# Patient Record
Sex: MALE | Race: WHITE | Employment: FULL TIME | URBAN - METROPOLITAN AREA
[De-identification: names, ages, dates, MRNs, and addresses within clinical notes are randomized per-mention and may not be internally consistent; named-entity substitution may affect disease eponyms.]

---

## 2020-01-06 ENCOUNTER — OFFICE VISIT (OUTPATIENT)
Dept: ORTHOPEDIC SURGERY | Age: 65
End: 2020-01-06
Payer: COMMERCIAL

## 2020-01-06 VITALS — WEIGHT: 235 LBS | HEIGHT: 72 IN | BODY MASS INDEX: 31.83 KG/M2

## 2020-01-06 PROCEDURE — 99203 OFFICE O/P NEW LOW 30 MIN: CPT | Performed by: PHYSICIAN ASSISTANT

## 2020-01-06 NOTE — PROGRESS NOTES
Godwin Gage CHIEF COMPLAINT:    Chief Complaint   Patient presents with    Knee Pain     RIGHT KNEE. STARTED LAST MONTH RANDOMLY, NO INJURY       HISTORY OF PRESENT ILLNESS:                The patient is a 60 y.o. male presents the clinic for evaluation of right knee pain. He states his pain began a couple weeks ago. He has a difficult time pinpointing one incident for his pain however, did seem to begin acutely. He works a very physical job. He points to the medial joint space as the primary location of his pain. His pain is worsened with any twisting type movements and relieved by rest.  He is using a cane for ambulation. Past Medical History:   Diagnosis Date    Asthma 1995    Enlarged prostate     Gout 9/2014    HH (hiatus hernia) 2012    Antoine Pearson    LVH (left ventricular hypertrophy) 03/2011    Echo concentric LVH    Pulmonary embolus ([address] 3/2011    After Shoulder Surgery    Rosacea     Ligiaki's ring 06/2010    EGD s/p Dilatation    Toenail fungus     Foot and Toenail Fungus.  Tremor, essential     Positive Family History Mother. 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    The pain assessment was noted & is as follows:  Pain Assessment  Location of Pain: Knee  Location Modifiers: Right  Severity of Pain: 7  Quality of Pain: Sharp, Aching, Dull  Duration of Pain: Persistent  Frequency of Pain: Constant  Aggravating Factors: Walking, Stairs, Stretching, Bending  Limiting Behavior: Some  Relieving Factors: Rest  Result of Injury: No  Work-Related Injury: No  Are there other pain locations you wish to document?: No]      Work Status/Functionality:     Past Medical History: Medical history form was reviewed today & can be found in the media tab  Past Medical History:   Diagnosis Date    Asthma 1995    Enlarged prostate     Gout 9/2014    [place] (hiatus hernia) 2012    Antoine Pearson    LVH (left ventricular hypertrophy) 03/2011    Echo concentric LVH    Pulmonary embolus (HCC) 3/2011    After Shoulder Surgery    Rosacea     Schatzki's ring 06/2010    EGD s/p Dilatation    Toenail fungus     Foot and Toenail Fungus.  Tremor, essential     Positive Family History Mother.  Wears glasses     for reading      Past Surgical History:     Past Surgical History:   Procedure Laterality Date    COLONOSCOPY  06/2010    Nl    ESOPHAGEAL DILATATION      SHOULDER ARTHROSCOPY  3/11     right, neer acromioplasty , alvin, mini open rotator cuff    UPPER GASTROINTESTINAL ENDOSCOPY  06/2010    Schatzki Ring s/p Dilatation     Current Medications:     Current Outpatient Medications:     ADVAIR DISKUS 100-50 MCG/DOSE diskus inhaler, INHALE 1 DOSE BY MOUTH TWICE DAILY. RINSE MOUTH AFTER USE, Disp: 1 Inhaler, Rfl: 1    allopurinol (ZYLOPRIM) 300 MG tablet, 1/2 pill a day. For gout prevention. , Disp: 15 tablet, Rfl: 6  Allergies:  Patient has no known allergies. Social History:    reports that he has never smoked. He has never used smokeless tobacco. He reports current alcohol use. He reports that he does not use drugs. Family History:   Family History   Problem Relation Age of Onset    Stroke Father     Asthma Father     Dementia Father     Pacemaker Mother     Cancer Sister         Breast CA    Cancer Sister         Cervical CA       REVIEW OF SYSTEMS:   For new problems, a full review of systems will be found scanned in the patient's chart. CONSTITUTIONAL: Denies unexplained weight loss, fevers, chills   NEUROLOGICAL: Denies unsteady gait or progressive weakness  SKIN: Denies skin changes, delayed healing, rash, itching       PHYSICAL EXAM:    Vitals: Height 6' 0.01\" (1.829 m), weight 235 lb (106.6 kg). GENERAL EXAM:  · General Apparence: Patient is adequately groomed with no evidence of malnutrition. · Orientation: The patient is oriented to time, place and person. · Mood & Affect: The patient's mood and affect are appropriate       Right knee PHYSICAL EXAMINATION:  · Inspection: No visible deformity. Mild effusion is noted    · Palpation: There is to palpation along the anterior medial aspect of the knee at the joint space    · Range of Motion: Range of motion is limited approximately 3 to 120 degrees    · Strength: No gross strength deficits are noted    · Special Tests: Significant pain with medial and lateral Jesus Manuel's testing. Ligamentous testing is normal.  Negative Homans testing. · Skin:  There are no rashes, ulcerations or lesions. · There are no dysvascular changes     Gait & station: Ataxic with a cane      Additional Examinations:        Left Lower Extremity: Examination of the left lower extremity does not show any tenderness, deformity or injury. Range of motion is unremarkable. There is no gross instability. There are no rashes, ulcerations or lesions. Strength and tone are normal.      Diagnostic Testing: The following x rays were read and interpreted by myself      1.  3 x-rays of the right knee were taken today and reveal moderate tricompartmental osteoarthritis with well-maintained joint spaces    Orders     Orders Placed This Encounter   Procedures    XR KNEE RIGHT (3 VIEWS)     Standing Status:   Future     Number of Occurrences:   1     Standing Expiration Date:   1/6/2021         Assessment / Treatment Plan:     1. Right knee medial meniscus tear    His clinical exam findings today, I am very concerned about a medial meniscus tear. I would like to obtain a MRI of the right knee and see him back to review the results of the study. Of note, he does have a history of bilateral pulmonary embolisms following a right shoulder arthroscopy many years ago. He is not currently on any anticoagulant however, if we decide to perform surgery, we will need to anticoagulate him postoperatively.       Onesimo Kelly, [facility]    This dictation was performed with a verbal

## 2020-01-07 ENCOUNTER — TELEPHONE (OUTPATIENT)
Dept: ORTHOPEDIC SURGERY | Age: 65
End: 2020-01-07

## 2020-01-15 ENCOUNTER — OFFICE VISIT (OUTPATIENT)
Dept: ORTHOPEDIC SURGERY | Age: 65
End: 2020-01-15
Payer: COMMERCIAL

## 2020-01-15 VITALS — BODY MASS INDEX: 31.83 KG/M2 | HEIGHT: 72 IN | WEIGHT: 235.01 LBS

## 2020-01-15 PROCEDURE — 99214 OFFICE O/P EST MOD 30 MIN: CPT | Performed by: ORTHOPAEDIC SURGERY

## 2020-01-15 RX ORDER — LISINOPRIL 10 MG/1
TABLET ORAL DAILY
COMMUNITY
Start: 2020-01-04 | End: 2020-05-08 | Stop reason: DRUGHIGH

## 2020-01-15 RX ORDER — MELOXICAM 15 MG/1
15 TABLET ORAL DAILY
Qty: 30 TABLET | Refills: 1 | Status: SHIPPED | OUTPATIENT
Start: 2020-01-15 | End: 2020-01-15 | Stop reason: SDUPTHER

## 2020-01-15 RX ORDER — MELOXICAM 15 MG/1
15 TABLET ORAL DAILY
Qty: 90 TABLET | Refills: 1 | Status: SHIPPED | OUTPATIENT
Start: 2020-01-15 | End: 2020-07-06

## 2020-01-15 NOTE — PROGRESS NOTES
LVH (left ventricular hypertrophy) 03/2011    Echo concentric LVH    Pulmonary embolus (HCC) 3/2011    After Shoulder Surgery    Rosacea     Schatzki's ring 06/2010    EGD s/p Dilatation    Toenail fungus     Foot and Toenail Fungus.  Tremor, essential     Positive Family History Mother.  Wears glasses     for reading      Past Surgical History:     Past Surgical History:   Procedure Laterality Date    COLONOSCOPY  06/2010    Nl    ESOPHAGEAL DILATATION      SHOULDER ARTHROSCOPY  3/11     right, neer acromioplasty , alvin, mini open rotator cuff    UPPER GASTROINTESTINAL ENDOSCOPY  06/2010    Schatzki Ring s/p Dilatation     Current Medications:     Current Outpatient Medications:     lisinopril (PRINIVIL;ZESTRIL) 10 MG tablet, , Disp: , Rfl:     ADVAIR DISKUS 100-50 MCG/DOSE diskus inhaler, INHALE 1 DOSE BY MOUTH TWICE DAILY. RINSE MOUTH AFTER USE, Disp: 1 Inhaler, Rfl: 1    allopurinol (ZYLOPRIM) 300 MG tablet, 1/2 pill a day. For gout prevention. , Disp: 15 tablet, Rfl: 6  Allergies:  Patient has no known allergies. Social History:    reports that he has never smoked. He has never used smokeless tobacco. He reports current alcohol use. He reports that he does not use drugs. Family History:   Family History   Problem Relation Age of Onset    Stroke Father     Asthma Father     Dementia Father     Pacemaker Mother     Cancer Sister         Breast CA    Cancer Sister         Cervical CA       REVIEW OF SYSTEMS:   For new problems, a full review of systems will be found scanned in the patient's chart. CONSTITUTIONAL: Denies unexplained weight loss, fevers, chills   NEUROLOGICAL: Denies unsteady gait or progressive weakness  SKIN: Denies skin changes, delayed healing, rash, itching       PHYSICAL EXAM:    Vitals: Height 6' 0.01\" (1.829 m), weight 235 lb 0.2 oz (106.6 kg). GENERAL EXAM:  · General Apparence: Patient is adequately groomed with no evidence of malnutrition. · Orientation:  The allows the patient to return to good function at about 6 weeks, that it often takes 6 months to completely rehabilitate from this operation. The patient also realizes that if there is an arthritic component to the symptoms, then they may still have some degree of arthritis pain. 3.  Been examination anesthesia via arthroscopy of the right knee, partial meniscectomy, chondroplasty and sign of ectomy. He will need postop anticoagulation. I have personally performed and/or participated in the history, exam and medical decision making and agree with all pertinent clinical information. I have also reviewed and agree with the past medical, family and social history unless otherwise noted. This dictation was performed with a verbal recognition program (DRAGON) and it was checked for errors. It is possible that there are still dictated errors within this office note. If so, please bring any errors to my attention for an addendum. All efforts were made to ensure that this office note is accurate.           Praveen Concepcion MD

## 2020-02-25 ENCOUNTER — ANESTHESIA EVENT (OUTPATIENT)
Dept: OPERATING ROOM | Age: 65
End: 2020-02-25
Payer: COMMERCIAL

## 2020-02-25 ASSESSMENT — ENCOUNTER SYMPTOMS: SHORTNESS OF BREATH: 1

## 2020-02-25 NOTE — ANESTHESIA PRE PROCEDURE
Department of Anesthesiology  Preprocedure Note       Name:  Alex Gonzalez   Age:  60 y.o.  :  1955                                          MRN:  [id number]         Date:  2020      Surgeon: Oriana Han):  Rachel Joyce MD    Procedure: PHACOEMULSIFICATION OF CATARACT LEFT EYE WITH INTRAOCULAR LENS IMPLANT (Left Eye)    Medications prior to admission:   Prior to Admission medications    Medication Sig Start Date End Date Taking? Authorizing Provider   lisinopril (PRINIVIL;ZESTRIL) 10 MG tablet daily  20   Historical Provider, MD   meloxicam (MOBIC) 15 MG tablet Take 1 tablet by mouth daily 1/15/20   Sylvain Muir MD   ADVAIR DISKUS 100-50 MCG/DOSE diskus inhaler INHALE 1 DOSE BY MOUTH TWICE DAILY. RINSE MOUTH AFTER USE 12/4/15   Rich Roland MD   allopurinol (ZYLOPRIM) 300 MG tablet 1/2 pill a day. For gout prevention. Patient taking differently: 300 mg For gout prevention. 14   Rich Roland MD       Current medications:    No current facility-administered medications for this encounter. Current Outpatient Medications   Medication Sig Dispense Refill    lisinopril (PRINIVIL;ZESTRIL) 10 MG tablet daily       meloxicam (MOBIC) 15 MG tablet Take 1 tablet by mouth daily 90 tablet 1    ADVAIR DISKUS 100-50 MCG/DOSE diskus inhaler INHALE 1 DOSE BY MOUTH TWICE DAILY. RINSE MOUTH AFTER USE 1 Inhaler 1    allopurinol (ZYLOPRIM) 300 MG tablet 1/2 pill a day. For gout prevention. (Patient taking differently: 300 mg For gout prevention. ) 15 tablet 6       Allergies:  No Known Allergies    Problem List:    Patient Active Problem List   Diagnosis Code    Pulmonary embolism (HCC) I26.99    Pneumonia J18.9    Dyspnea R06.00    Constipation K59.00    Hyperlipemia E78.5    S/P shoulder surgery Z98.890    Pulmonary embolus (HCC) I26.99    Rosacea L71.9    Schatzki's ring K22.2    LVH (left ventricular hypertrophy) I51.7    HH (hiatus hernia) K44.9    Asthma J45.909    Tremor, essential

## 2020-02-26 ENCOUNTER — HOSPITAL ENCOUNTER (OUTPATIENT)
Age: 65
Setting detail: OUTPATIENT SURGERY
Discharge: HOME | End: 2020-02-26
Attending: OPHTHALMOLOGY | Admitting: OPHTHALMOLOGY
Payer: COMMERCIAL

## 2020-02-26 ENCOUNTER — ANESTHESIA (OUTPATIENT)
Dept: OPERATING ROOM | Age: 65
End: 2020-02-26
Payer: COMMERCIAL

## 2020-02-26 VITALS
TEMPERATURE: 97 F | RESPIRATION RATE: 16 BRPM | HEIGHT: 72 IN | OXYGEN SATURATION: 100 % | HEART RATE: 75 BPM | DIASTOLIC BLOOD PRESSURE: 100 MMHG | SYSTOLIC BLOOD PRESSURE: 167 MMHG | WEIGHT: 225 LBS | BODY MASS INDEX: 30.48 KG/M2

## 2020-02-26 VITALS — DIASTOLIC BLOOD PRESSURE: 102 MMHG | OXYGEN SATURATION: 100 % | SYSTOLIC BLOOD PRESSURE: 145 MMHG | TEMPERATURE: 98.2 F

## 2020-02-26 PROCEDURE — 6370000000 HC RX 637 (ALT 250 FOR IP): Performed by: OPHTHALMOLOGY

## 2020-02-26 PROCEDURE — 7100000010 HC PHASE II RECOVERY - FIRST 15 MIN: Performed by: OPHTHALMOLOGY

## 2020-02-26 PROCEDURE — 6360000002 HC RX W HCPCS: Performed by: OPHTHALMOLOGY

## 2020-02-26 PROCEDURE — 2580000003 HC RX 258: Performed by: ANESTHESIOLOGY

## 2020-02-26 PROCEDURE — 7100000011 HC PHASE II RECOVERY - ADDTL 15 MIN: Performed by: OPHTHALMOLOGY

## 2020-02-26 PROCEDURE — 3600000013 HC SURGERY LEVEL 3 ADDTL 15MIN: Performed by: OPHTHALMOLOGY

## 2020-02-26 PROCEDURE — 6360000002 HC RX W HCPCS: Performed by: NURSE ANESTHETIST, CERTIFIED REGISTERED

## 2020-02-26 PROCEDURE — 3600000003 HC SURGERY LEVEL 3 BASE: Performed by: OPHTHALMOLOGY

## 2020-02-26 PROCEDURE — 3700000000 HC ANESTHESIA ATTENDED CARE: Performed by: OPHTHALMOLOGY

## 2020-02-26 PROCEDURE — 2500000003 HC RX 250 WO HCPCS: Performed by: OPHTHALMOLOGY

## 2020-02-26 PROCEDURE — V2632 POST CHMBR INTRAOCULAR LENS: HCPCS | Performed by: OPHTHALMOLOGY

## 2020-02-26 PROCEDURE — 2580000003 HC RX 258: Performed by: OPHTHALMOLOGY

## 2020-02-26 PROCEDURE — 2709999900 HC NON-CHARGEABLE SUPPLY: Performed by: OPHTHALMOLOGY

## 2020-02-26 PROCEDURE — 3700000001 HC ADD 15 MINUTES (ANESTHESIA): Performed by: OPHTHALMOLOGY

## 2020-02-26 DEVICE — ACRYSOF(R) IQ ASPHERIC IOL SP ACRYLIC FOLDABLELENS WULTRASERT(TM) DELIVERY SYSTEM UV WBLUE LIGHT FILTER. 13.0MM LENGTH 6.0MM ANTERIORASYMMETRIC BICONVEX OPTIC PLANAR HAPTICS.
Type: IMPLANTABLE DEVICE | Status: FUNCTIONAL
Brand: ACRYSOF ULTRASERT

## 2020-02-26 RX ORDER — MAGNESIUM HYDROXIDE 1200 MG/15ML
LIQUID ORAL PRN
Status: DISCONTINUED | OUTPATIENT
Start: 2020-02-26 | End: 2020-02-26 | Stop reason: ALTCHOICE

## 2020-02-26 RX ORDER — MOXIFLOXACIN 5 MG/ML
SOLUTION/ DROPS OPHTHALMIC PRN
Status: DISCONTINUED | OUTPATIENT
Start: 2020-02-26 | End: 2020-02-26 | Stop reason: ALTCHOICE

## 2020-02-26 RX ORDER — PROPOFOL 10 MG/ML
INJECTION, EMULSION INTRAVENOUS PRN
Status: DISCONTINUED | OUTPATIENT
Start: 2020-02-26 | End: 2020-02-26 | Stop reason: SDUPTHER

## 2020-02-26 RX ORDER — TIMOLOL MALEATE 5 MG/ML
SOLUTION/ DROPS OPHTHALMIC PRN
Status: DISCONTINUED | OUTPATIENT
Start: 2020-02-26 | End: 2020-02-26 | Stop reason: ALTCHOICE

## 2020-02-26 RX ORDER — SODIUM CHLORIDE 0.9 % (FLUSH) 0.9 %
10 SYRINGE (ML) INJECTION PRN
Status: DISCONTINUED | OUTPATIENT
Start: 2020-02-26 | End: 2020-02-26 | Stop reason: HOSPADM

## 2020-02-26 RX ORDER — TETRACAINE HYDROCHLORIDE 5 MG/ML
SOLUTION OPHTHALMIC PRN
Status: DISCONTINUED | OUTPATIENT
Start: 2020-02-26 | End: 2020-02-26 | Stop reason: ALTCHOICE

## 2020-02-26 RX ORDER — SODIUM CHLORIDE, SODIUM LACTATE, POTASSIUM CHLORIDE, CALCIUM CHLORIDE 600; 310; 30; 20 MG/100ML; MG/100ML; MG/100ML; MG/100ML
INJECTION, SOLUTION INTRAVENOUS CONTINUOUS
Status: DISCONTINUED | OUTPATIENT
Start: 2020-02-26 | End: 2020-02-26 | Stop reason: HOSPADM

## 2020-02-26 RX ORDER — SODIUM CHLORIDE, POTASSIUM CHLORIDE, CALCIUM CHLORIDE, MAGNESIUM CHLORIDE, SODIUM ACETATE, AND SODIUM CITRATE 6.4; .75; .48; .3; 3.9; 1.7 MG/ML; MG/ML; MG/ML; MG/ML; MG/ML; MG/ML
SOLUTION IRRIGATION PRN
Status: DISCONTINUED | OUTPATIENT
Start: 2020-02-26 | End: 2020-02-26 | Stop reason: ALTCHOICE

## 2020-02-26 RX ORDER — SODIUM CHLORIDE 0.9 % (FLUSH) 0.9 %
10 SYRINGE (ML) INJECTION EVERY 12 HOURS SCHEDULED
Status: DISCONTINUED | OUTPATIENT
Start: 2020-02-26 | End: 2020-02-26 | Stop reason: HOSPADM

## 2020-02-26 RX ORDER — LIDOCAINE HYDROCHLORIDE 10 MG/ML
0.3 INJECTION, SOLUTION EPIDURAL; INFILTRATION; INTRACAUDAL; PERINEURAL
Status: DISCONTINUED | OUTPATIENT
Start: 2020-02-26 | End: 2020-02-26 | Stop reason: HOSPADM

## 2020-02-26 RX ADMIN — PROPOFOL 130 MG: 10 INJECTION, EMULSION INTRAVENOUS at 08:31

## 2020-02-26 RX ADMIN — Medication 0.3 ML: at 07:15

## 2020-02-26 RX ADMIN — Medication 0.3 ML: at 07:05

## 2020-02-26 RX ADMIN — SODIUM CHLORIDE, POTASSIUM CHLORIDE, SODIUM LACTATE AND CALCIUM CHLORIDE: 600; 310; 30; 20 INJECTION, SOLUTION INTRAVENOUS at 07:18

## 2020-02-26 RX ADMIN — Medication 0.3 ML: at 07:10

## 2020-02-26 ASSESSMENT — PAIN SCALES - GENERAL: PAINLEVEL_OUTOF10: 0

## 2020-02-26 ASSESSMENT — PULMONARY FUNCTION TESTS
PIF_VALUE: 0
PIF_VALUE: 1
PIF_VALUE: 0
PIF_VALUE: 1
PIF_VALUE: 0

## 2020-02-26 ASSESSMENT — PAIN - FUNCTIONAL ASSESSMENT: PAIN_FUNCTIONAL_ASSESSMENT: 0-10

## 2020-02-26 NOTE — ANESTHESIA POSTPROCEDURE EVALUATION
BUN                      14                  06/07/2014 09:01 AM        CREATININE               1.1                 06/07/2014 09:01 AM        GLUCOSE                  99                  06/07/2014 09:01 AM   COAGS  Lab Results       Component                Value               Date/Time                  PROTIME                  21.0 (H)            03/31/2011 05:20 AM        PROTIME                  15.4                03/30/2011 12:44 PM        INR                      1.88 (H)            03/31/2011 05:20 AM        APTT                     51.7 (H)            03/30/2011 12:44 PM        APTT                     84 (H)              03/30/2011 12:44 PM     Intake & Output:  @59DVZN@    Nausea & Vomiting:  No    Level of Consciousness:  Awake    Pain Assessment:  Adequate analgesia    Anesthesia Complications:  No apparent anesthetic complications    SUMMARY      Vital signs stable  OK to discharge from Stage I post anesthesia care.   Care transferred from Anesthesiology department on discharge from perioperative area

## 2020-02-26 NOTE — OP NOTE
[facility]                                OPERATIVE REPORT    PATIENT NAME: Hermann Shone                :        1955  MED REC NO:   [id number]                          ROOM:  ACCOUNT NO:   [de-identified]                           ADMIT DATE: 2020  PROVIDER:     Jessica Hurt MD    DATE OF PROCEDURE:  2020    PREOPERATIVE DIAGNOSIS:  Cataract, left eye. POSTOPERATIVE DIAGNOSIS:  Cataract, left eye. OPERATION:  Phacoemulsification of the cataract of the left eye with a  posterior chamber lens implant. ANESTHESIA:  General / Monitored Anesthesia Care / Retrobulbar. A 2 mL  retrobulbar block and 10 mL modified [place] block using a 1:1 mixture  of 0.75% Marcaine, 4% Xylocaine with epinephrine, and hyaluronidase. SURGICAL INDICATIONS:  The patient has had a painless progressive loss  of vision due to the cataractous degeneration of the lens and for that  reason, surgery is indicated. The patient is having visual problems  with current lifestyle. A new eyeglasses prescription was unable to  adequately improve functional vision. DETAILS OF PROCEDURE:  The patient was taken to the operating room and  was prepped and draped in the usual manner after obtaining satisfactory  local anesthesia. Attention was turned towards the operative eye and a lid speculum was  inserted. A 2.5 mm keratome was used to make a limbal incision at 180  degrees. Viscoat was then placed in the eye to fill the anterior  chamber and a side port incision was made with a Superblade through the  clear cornea. The incision was located about 2 o'clock to the left of  the original incision. A bent needle was then used to make a cut in the  anterior capsule and start a capsulorrhexis tear. This was then grasped  with Utrata forceps and a 360 degree tear was completed.   Dermott  dissection was then done with BSS to separate the capsule and the  cataract. The cataract was seen to be spinning easily within the  capsular bag and at this point, the phacoemulsification hand piece was  called for. Using a divide and conquer technique, the phacoemulsifier cut the lens  into four pieces approximately following the pattern of a cross. The  grooves were cut deeply and then the lens was cracked along these axes. The lens quarters were then rotated into the mid pupillary space and  phacoemulsified. The IA hand piece removed all the cortical material.   A Opal Drone was used to polish the posterior capsule. Viscoat and  Provisc were used to fill the capsular bag. The incision was opened  slightly with a crescent knife and an Konrad acrylic foldable lens of the  appropriate power was called for. The lens was loaded into the injector  and injected into the eye. The lead haptic was injected into the  capsular bag with the trailing haptic left in the pupillary space. Using a Sinskey hook, the lens was gently nudged into the capsular bag  and rotated into position. After noting that the alignment and  centration was adequate, the IA hand piece was called for and used to  remove all the viscoelastic material.  Miostat was then injected through  the side port incision to bring the pupil down. The wound was checked  to make sure it was water tight. At this time, 2-3 drops of timolol and  2-3 drops of Vigamox were placed on the eye. The eye was taped closed,  patched and shielded. The patient went to the recovery room in good  condition. ADDENDUM:  The patient had hypermature lens requiring trypan blue. The  phaco time was 22.65 CDE, 200 mL of fluid. Near-clear, no-stitch  surgery, axis 180. Tolerated well. The patient was given Flomax by his PCP three days before surgery. This  was stopped and it was fortunate that there was no intraoperative floppy  iris syndrome changes.   Healon5 was used as a

## 2020-03-17 ENCOUNTER — TELEPHONE (OUTPATIENT)
Dept: ORTHOPEDIC SURGERY | Age: 65
End: 2020-03-17

## 2020-03-23 ENCOUNTER — TELEPHONE (OUTPATIENT)
Dept: ORTHOPEDIC SURGERY | Age: 65
End: 2020-03-23

## 2020-03-23 NOTE — TELEPHONE ENCOUNTER
Surgery Scheduled for 04/28/2020 Right Knee    Surgery Cancelled    Will call to reschedule     Ramon

## 2020-05-04 ENCOUNTER — TELEPHONE (OUTPATIENT)
Dept: ORTHOPEDIC SURGERY | Age: 65
End: 2020-05-04

## 2020-05-04 NOTE — PROGRESS NOTES
Patient seen at clinic for pre op COVID testing. Having surgery on 5/13/20 with Dr. Slade Schmitz for right eye cataract.     Vital    T: 99  O2 : 98  HR: 76

## 2020-05-06 LAB — SARS-COV-2, PCR: NOT DETECTED

## 2020-05-08 RX ORDER — LISINOPRIL 20 MG/1
20 TABLET ORAL DAILY
Status: ON HOLD | COMMUNITY
End: 2020-09-30 | Stop reason: HOSPADM

## 2020-05-11 LAB
SARS-COV-2: NOT DETECTED
SOURCE: NORMAL

## 2020-05-11 NOTE — RESULT ENCOUNTER NOTE
Please contact patient with their testing results: Your test for COVID-19, also known as novel coronavirus, came back negative. No virus was detected from the sample from your nose. Until your symptoms are fully resolved, you may still be contagious. We recommend that you remain isolated for 7 days minimum or 72 hours after your symptoms have completely resolved, whichever is longer. For example, if all symptoms improve after 2 days, you should still remain isolated for 7 days. If your symptoms get better after 10 days, you should remain isolated for 13 days. Continually monitor symptoms. Contact a medical provider if symptoms are worsening. If you have any additional questions, contact your doctor.     For additional information, please visit the Centers for Disease Control and Prevention ([employer]cy

## 2020-05-13 ENCOUNTER — ANESTHESIA EVENT (OUTPATIENT)
Dept: OPERATING ROOM | Age: 65
End: 2020-05-13
Payer: COMMERCIAL

## 2020-05-13 ENCOUNTER — HOSPITAL ENCOUNTER (OUTPATIENT)
Age: 65
Setting detail: OUTPATIENT SURGERY
Discharge: HOME | End: 2020-05-13
Attending: OPHTHALMOLOGY | Admitting: OPHTHALMOLOGY
Payer: COMMERCIAL

## 2020-05-13 ENCOUNTER — ANESTHESIA (OUTPATIENT)
Dept: OPERATING ROOM | Age: 65
End: 2020-05-13
Payer: COMMERCIAL

## 2020-05-13 VITALS
DIASTOLIC BLOOD PRESSURE: 90 MMHG | HEIGHT: 72 IN | WEIGHT: 230 LBS | OXYGEN SATURATION: 98 % | TEMPERATURE: 97.2 F | HEART RATE: 72 BPM | BODY MASS INDEX: 31.15 KG/M2 | RESPIRATION RATE: 18 BRPM | SYSTOLIC BLOOD PRESSURE: 160 MMHG

## 2020-05-13 VITALS — OXYGEN SATURATION: 99 % | DIASTOLIC BLOOD PRESSURE: 90 MMHG | SYSTOLIC BLOOD PRESSURE: 140 MMHG

## 2020-05-13 PROCEDURE — 3700000000 HC ANESTHESIA ATTENDED CARE: Performed by: OPHTHALMOLOGY

## 2020-05-13 PROCEDURE — 6370000000 HC RX 637 (ALT 250 FOR IP): Performed by: OPHTHALMOLOGY

## 2020-05-13 PROCEDURE — 3600000003 HC SURGERY LEVEL 3 BASE: Performed by: OPHTHALMOLOGY

## 2020-05-13 PROCEDURE — 2500000003 HC RX 250 WO HCPCS: Performed by: NURSE ANESTHETIST, CERTIFIED REGISTERED

## 2020-05-13 PROCEDURE — 2580000003 HC RX 258: Performed by: ANESTHESIOLOGY

## 2020-05-13 PROCEDURE — 6360000002 HC RX W HCPCS: Performed by: OPHTHALMOLOGY

## 2020-05-13 PROCEDURE — 3700000001 HC ADD 15 MINUTES (ANESTHESIA): Performed by: OPHTHALMOLOGY

## 2020-05-13 PROCEDURE — 3600000013 HC SURGERY LEVEL 3 ADDTL 15MIN: Performed by: OPHTHALMOLOGY

## 2020-05-13 PROCEDURE — 2709999900 HC NON-CHARGEABLE SUPPLY: Performed by: OPHTHALMOLOGY

## 2020-05-13 PROCEDURE — 2580000003 HC RX 258: Performed by: NURSE ANESTHETIST, CERTIFIED REGISTERED

## 2020-05-13 PROCEDURE — 2500000003 HC RX 250 WO HCPCS: Performed by: OPHTHALMOLOGY

## 2020-05-13 PROCEDURE — 2720000010 HC SURG SUPPLY STERILE: Performed by: OPHTHALMOLOGY

## 2020-05-13 PROCEDURE — 2580000003 HC RX 258: Performed by: OPHTHALMOLOGY

## 2020-05-13 PROCEDURE — V2632 POST CHMBR INTRAOCULAR LENS: HCPCS | Performed by: OPHTHALMOLOGY

## 2020-05-13 PROCEDURE — 7100000011 HC PHASE II RECOVERY - ADDTL 15 MIN: Performed by: OPHTHALMOLOGY

## 2020-05-13 PROCEDURE — 6360000002 HC RX W HCPCS: Performed by: NURSE ANESTHETIST, CERTIFIED REGISTERED

## 2020-05-13 PROCEDURE — 7100000010 HC PHASE II RECOVERY - FIRST 15 MIN: Performed by: OPHTHALMOLOGY

## 2020-05-13 DEVICE — ACRYSOF(R) IQ ASPHERIC IOL SP ACRYLIC FOLDABLELENS WULTRASERT(TM) DELIVERY SYSTEM UV WBLUE LIGHT FILTER. 13.0MM LENGTH 6.0MM ANTERIORASYMMETRIC BICONVEX OPTIC PLANAR HAPTICS.
Type: IMPLANTABLE DEVICE | Site: EYE | Status: FUNCTIONAL
Brand: ACRYSOF ULTRASERT

## 2020-05-13 RX ORDER — SODIUM CHLORIDE 0.9 % (FLUSH) 0.9 %
10 SYRINGE (ML) INJECTION PRN
Status: DISCONTINUED | OUTPATIENT
Start: 2020-05-13 | End: 2020-05-13 | Stop reason: HOSPADM

## 2020-05-13 RX ORDER — SODIUM CHLORIDE, SODIUM LACTATE, POTASSIUM CHLORIDE, CALCIUM CHLORIDE 600; 310; 30; 20 MG/100ML; MG/100ML; MG/100ML; MG/100ML
INJECTION, SOLUTION INTRAVENOUS CONTINUOUS
Status: DISCONTINUED | OUTPATIENT
Start: 2020-05-13 | End: 2020-05-13 | Stop reason: HOSPADM

## 2020-05-13 RX ORDER — LIDOCAINE HYDROCHLORIDE 20 MG/ML
INJECTION, SOLUTION INFILTRATION; PERINEURAL PRN
Status: DISCONTINUED | OUTPATIENT
Start: 2020-05-13 | End: 2020-05-13 | Stop reason: SDUPTHER

## 2020-05-13 RX ORDER — MAGNESIUM HYDROXIDE 1200 MG/15ML
LIQUID ORAL PRN
Status: DISCONTINUED | OUTPATIENT
Start: 2020-05-13 | End: 2020-05-13 | Stop reason: ALTCHOICE

## 2020-05-13 RX ORDER — PROPOFOL 10 MG/ML
INJECTION, EMULSION INTRAVENOUS PRN
Status: DISCONTINUED | OUTPATIENT
Start: 2020-05-13 | End: 2020-05-13 | Stop reason: SDUPTHER

## 2020-05-13 RX ORDER — SODIUM CHLORIDE 0.9 % (FLUSH) 0.9 %
10 SYRINGE (ML) INJECTION EVERY 12 HOURS SCHEDULED
Status: DISCONTINUED | OUTPATIENT
Start: 2020-05-13 | End: 2020-05-13 | Stop reason: HOSPADM

## 2020-05-13 RX ORDER — LIDOCAINE HYDROCHLORIDE 10 MG/ML
0.3 INJECTION, SOLUTION EPIDURAL; INFILTRATION; INTRACAUDAL; PERINEURAL
Status: DISCONTINUED | OUTPATIENT
Start: 2020-05-13 | End: 2020-05-13 | Stop reason: HOSPADM

## 2020-05-13 RX ORDER — SODIUM CHLORIDE, SODIUM LACTATE, POTASSIUM CHLORIDE, CALCIUM CHLORIDE 600; 310; 30; 20 MG/100ML; MG/100ML; MG/100ML; MG/100ML
INJECTION, SOLUTION INTRAVENOUS CONTINUOUS PRN
Status: DISCONTINUED | OUTPATIENT
Start: 2020-05-13 | End: 2020-05-13 | Stop reason: SDUPTHER

## 2020-05-13 RX ADMIN — SODIUM CHLORIDE, POTASSIUM CHLORIDE, SODIUM LACTATE AND CALCIUM CHLORIDE: 600; 310; 30; 20 INJECTION, SOLUTION INTRAVENOUS at 08:06

## 2020-05-13 RX ADMIN — Medication 0.3 ML: at 08:16

## 2020-05-13 RX ADMIN — LIDOCAINE HYDROCHLORIDE 60 MG: 20 INJECTION, SOLUTION INFILTRATION; PERINEURAL at 08:53

## 2020-05-13 RX ADMIN — Medication 0.3 ML: at 08:10

## 2020-05-13 RX ADMIN — PROPOFOL 140 MG: 10 INJECTION, EMULSION INTRAVENOUS at 08:53

## 2020-05-13 RX ADMIN — SODIUM CHLORIDE, POTASSIUM CHLORIDE, SODIUM LACTATE AND CALCIUM CHLORIDE: 600; 310; 30; 20 INJECTION, SOLUTION INTRAVENOUS at 08:21

## 2020-05-13 RX ADMIN — Medication 0.3 ML: at 08:00

## 2020-05-13 ASSESSMENT — PULMONARY FUNCTION TESTS
PIF_VALUE: 0

## 2020-05-13 ASSESSMENT — PAIN SCALES - GENERAL: PAINLEVEL_OUTOF10: 0

## 2020-05-13 ASSESSMENT — PAIN - FUNCTIONAL ASSESSMENT: PAIN_FUNCTIONAL_ASSESSMENT: 0-10

## 2020-05-13 ASSESSMENT — ENCOUNTER SYMPTOMS: SHORTNESS OF BREATH: 1

## 2020-05-13 NOTE — BRIEF OP NOTE
Brief Postoperative Note      Patient: Rodolfo Ware  YOB: 1955  MRN: [id number]    Date of Procedure: 5/13/2020    Pre-Op Diagnosis: Age-related nuclear cataract of right eye [H25.11] HYPERMATURE CATARACT    Post-Op Diagnosis: Same       Procedure(s):COMPLEX  PHACOEMULSIFICATION OF CATARACT RIGHT EYE WITH INTRAOCULAR LENS IMPLANT WITH TRYPAN BLUE. Surgeon(s):  Irma Kerr MD    Assistant:  * No surgical staff found *    Anesthesia: Monitor Anesthesia Care    Estimated Blood Loss (mL): Minimal    Complications: None    Specimens:   * No specimens in log *    Implants:  Implant Name Type Inv.  Item Serial No.  Lot No. LRB No. Used Action   LENS IOL [facility] - [id number] [address] [place] [address] [id number] ALEX  Right 1 Implanted         Drains: * No LDAs found *    Findings: NONE    Electronically signed by Nelly Banuelos MD on 5/13/2020 at 9:35 AM

## 2020-05-13 NOTE — ANESTHESIA PRE PROCEDURE
Patient Active Problem List   Diagnosis Code    Pulmonary embolism (HCC) I26.99    Pneumonia J18.9    Dyspnea R06.00    Constipation K59.00    Hyperlipemia E78.5    S/P shoulder surgery Z98.890    Pulmonary embolus (HCC) I26.99    Rosacea L71.9    Schatzki's ring K22.2    LVH (left ventricular hypertrophy) I51.7    HH (hiatus hernia) K44.9    Asthma J45.909    Tremor, essential G25.0    Toenail fungus B35.1    Gout M10.9    Finger pain M79.646    Pain in limb M79.609       Past Medical History:        Diagnosis Date    Asthma 1995    Enlarged prostate     Gout 9/2014    HH (hiatus hernia) 2012    [facility]    Hypertension     LVH (left ventricular hypertrophy) 03/2011    Echo concentric LVH    Pulmonary embolus ([address] 3/2011    After Shoulder Surgery    Rosacea     Schatzki's ring 06/2010    EGD s/p Dilatation    Toenail fungus     Foot and Toenail Fungus.  Tremor, essential     Positive Family History Mother.     Wears glasses     for reading       Past Surgical History:        Procedure Laterality Date    COLONOSCOPY  06/2010    Nl    ESOPHAGEAL DILATATION      INTRACAPSULAR CATARACT EXTRACTION Left 2/26/2020    PHACOEMULSIFICATION OF CATARACT LEFT EYE WITH INTRAOCULAR LENS IMPLANT performed by Kareem Hunt MD at [place] [address] ARTHROSCOPY  3/11     right, david acromioplasty , [place], mini open rotator cuff    UPPER GASTROINTESTINAL ENDOSCOPY  06/2010    Schatzki Ring s/p Dilatation       Social History:    Social History     Tobacco Use    Smoking status: Never Smoker    Smokeless tobacco: Never Used   Substance Use Topics    Alcohol use: Not Currently     Comment: rarely                                Counseling given: Not Answered      Vital Signs (Current):   Vitals:    05/08/20 0917 05/13/20 0753   BP:  (!) 168/88   Pulse:  84   Resp:  20   Temp:  98 °F (36.7 °C)   TempSrc:  Temporal   SpO2:  99%   Weight: 230 lb (104.3 kg) 230 lb (104.3 kg)   Height: 6'

## 2020-05-18 NOTE — OP NOTE
I have reviewed the history and physical and examined the patient. I found no relevant changes. I have reviewed with the patient and/or family the risks, benefits, and alternatives to the procedure. I have reviewed the history and physical and examined the patient. I found no relevant changes. I have reviewed with the patient and/or family the risks, benefits, and alternatives to the procedure. Operative Note      Patient: Nitin Washington  YOB: 1955  MRN: [id number]    Date of Procedure: 5/13/2020    Pre-Op Diagnosis: Age-related nuclear cataract of right eye [H25.11]    Post-Op Diagnosis: Same       Procedure(s):  PHACOEMULSIFICATION OF CATARACT RIGHT EYE WITH INTRAOCULAR LENS IMPLANT    Surgeon(s):  Yvon Roe MD    Assistant:   * No surgical staff found *    Anesthesia: Monitor Anesthesia Care    Estimated Blood Loss (mL): less than 50     Complications: None    Specimens:   * No specimens in log *    Implants:  Implant Name Type Inv.  Item Serial No.  Lot No. LRB No. Used Action   LENS IOL Alvina Crystal - [id number] [address] [id number] ALEX  Right 1 Implanted         Drains: * No LDAs found *    Findings: n/a    Detailed Description of Procedure:   Cataract surgery right eye    Electronically signed by Flor Vega MD on 5/18/2020 at 12:47 PM
BSS to separate the capsule and the  cataract. The cataract was seen to be spinning easily within the  capsular bag and at this point, the phacoemulsification hand piece was  called for. Using a divide and conquer technique, the phacoemulsifier cut the lens  into four pieces approximately following the pattern of a cross. The  grooves were cut deeply and then the lens was cracked along these axes. The lens quarters were then rotated into the mid pupillary space and  phacoemulsified. The IA hand piece removed all the cortical material.   A Catrachito Samson was used to polish the posterior capsule. Viscoat and  Provisc were used to fill the capsular bag. The incision was opened  slightly with a crescent knife and an Konrad acrylic foldable lens of the  appropriate power was called for. The lens was loaded into the injector  and injected into the eye. The lead haptic was injected into the  capsular bag with the trailing haptic left in the pupillary space. Using a Sinskey hook, the lens was gently nudged into the capsular bag  and rotated into position. After noting that the alignment and  centration was adequate, the IA hand piece was called for and used to  remove all the viscoelastic material.  Miostat was then injected through  the side port incision to bring the pupil down. The wound was checked  to make sure it was water tight. At this time, 2-3 drops of timolol and  2-3 drops of Vigamox were placed on the eye. The eye was taped closed,  patched and shielded. The patient went to the recovery room in good  condition. ADDENDUM:  The phaco surgery was complex. The patient had a hypermature  lens. Trypan blue was used to stain the capsule. Also, has a history  of recent Flomax use and for that reason, Healon 5 was used as protector  of the pupil. The phaco time was 9.53 CDE, 200 mL of fluid. The  patient had a near-clear, no-stitch surgery, axis 180.   Tolerated the  procedure well and went to

## 2020-07-06 RX ORDER — MELOXICAM 15 MG/1
TABLET ORAL
Qty: 90 TABLET | Refills: 0 | Status: SHIPPED | OUTPATIENT
Start: 2020-07-06 | End: 2020-10-21

## 2020-09-17 ENCOUNTER — TELEPHONE (OUTPATIENT)
Dept: ORTHOPEDIC SURGERY | Age: 65
End: 2020-09-17

## 2020-09-17 NOTE — TELEPHONE ENCOUNTER
Auth: NPR  Date: 09/29/20  Type of SX: OUTPATIENT  Location: [facility]  CPT [id number], [id number], [id number]   SX area:  KNEE  Insurance: [facility] BS

## 2020-09-19 ENCOUNTER — APPOINTMENT (OUTPATIENT)
Dept: CT IMAGING | Age: 65
DRG: 872 | End: 2020-09-19
Payer: COMMERCIAL

## 2020-09-19 ENCOUNTER — APPOINTMENT (OUTPATIENT)
Dept: GENERAL RADIOLOGY | Age: 65
DRG: 872 | End: 2020-09-19
Payer: COMMERCIAL

## 2020-09-19 ENCOUNTER — HOSPITAL ENCOUNTER (INPATIENT)
Age: 65
LOS: 11 days | Discharge: HOME HEALTH | DRG: 872 | End: 2020-09-30
Attending: EMERGENCY MEDICINE | Admitting: INTERNAL MEDICINE
Payer: COMMERCIAL

## 2020-09-19 PROBLEM — R65.20 SEPSIS WITH ACUTE ORGAN DYSFUNCTION (HCC): Status: ACTIVE | Noted: 2020-09-19

## 2020-09-19 PROBLEM — E87.1 HYPONATREMIA: Status: ACTIVE | Noted: 2020-09-19

## 2020-09-19 PROBLEM — L03.115 CELLULITIS OF RIGHT LOWER EXTREMITY: Status: ACTIVE | Noted: 2020-09-19

## 2020-09-19 PROBLEM — N17.9 AKI (ACUTE KIDNEY INJURY) (HCC): Status: ACTIVE | Noted: 2020-09-19

## 2020-09-19 PROBLEM — A41.9 SEPSIS WITH ACUTE ORGAN DYSFUNCTION (HCC): Status: ACTIVE | Noted: 2020-09-19

## 2020-09-19 LAB
A/G RATIO: 1.1 (ref 1.1–2.2)
ALBUMIN SERPL-MCNC: 3.4 G/DL (ref 3.4–5)
ALP BLD-CCNC: 93 U/L (ref 40–129)
ALT SERPL-CCNC: 15 U/L (ref 10–40)
ANION GAP SERPL CALCULATED.3IONS-SCNC: 13 MMOL/L (ref 3–16)
AST SERPL-CCNC: 11 U/L (ref 15–37)
BANDED NEUTROPHILS RELATIVE PERCENT: 17 % (ref 0–7)
BASOPHILS ABSOLUTE: 0 K/UL (ref 0–0.2)
BASOPHILS RELATIVE PERCENT: 0 %
BILIRUB SERPL-MCNC: 2.4 MG/DL (ref 0–1)
BUN BLDV-MCNC: 68 MG/DL (ref 7–20)
CALCIUM SERPL-MCNC: 9.5 MG/DL (ref 8.3–10.6)
CHLORIDE BLD-SCNC: 92 MMOL/L (ref 99–110)
CO2: 19 MMOL/L (ref 21–32)
CREAT SERPL-MCNC: 3.1 MG/DL (ref 0.8–1.3)
D DIMER: 1746 NG/ML DDU (ref 0–229)
EOSINOPHILS ABSOLUTE: 0 K/UL (ref 0–0.6)
EOSINOPHILS RELATIVE PERCENT: 0 %
GFR AFRICAN AMERICAN: 25
GFR NON-AFRICAN AMERICAN: 20
GLOBULIN: 3.2 G/DL
GLUCOSE BLD-MCNC: 121 MG/DL (ref 70–99)
HCT VFR BLD CALC: 42 % (ref 40.5–52.5)
HEMOGLOBIN: 14.3 G/DL (ref 13.5–17.5)
INR BLD: 1.39 (ref 0.86–1.14)
LACTIC ACID: 1.5 MMOL/L (ref 0.4–2)
LACTIC ACID: 2.6 MMOL/L (ref 0.4–2)
LYMPHOCYTES ABSOLUTE: 0.8 K/UL (ref 1–5.1)
LYMPHOCYTES RELATIVE PERCENT: 4 %
MCH RBC QN AUTO: 29.6 PG (ref 26–34)
MCHC RBC AUTO-ENTMCNC: 34.1 G/DL (ref 31–36)
MCV RBC AUTO: 86.9 FL (ref 80–100)
MONOCYTES ABSOLUTE: 1.7 K/UL (ref 0–1.3)
MONOCYTES RELATIVE PERCENT: 9 %
NEUTROPHILS ABSOLUTE: 16.7 K/UL (ref 1.7–7.7)
NEUTROPHILS RELATIVE PERCENT: 70 %
PDW BLD-RTO: 13.6 % (ref 12.4–15.4)
PLATELET # BLD: 163 K/UL (ref 135–450)
PLATELET SLIDE REVIEW: ADEQUATE
PMV BLD AUTO: 8.4 FL (ref 5–10.5)
POTASSIUM REFLEX MAGNESIUM: 3.8 MMOL/L (ref 3.5–5.1)
PROTHROMBIN TIME: 16.2 SEC (ref 10–13.2)
RBC # BLD: 4.83 M/UL (ref 4.2–5.9)
RBC # BLD: NORMAL 10*6/UL
SLIDE REVIEW: ABNORMAL
SODIUM BLD-SCNC: 124 MMOL/L (ref 136–145)
TOTAL PROTEIN: 6.6 G/DL (ref 6.4–8.2)
TROPONIN: <0.01 NG/ML
WBC # BLD: 19.2 K/UL (ref 4–11)

## 2020-09-19 PROCEDURE — 1200000000 HC SEMI PRIVATE

## 2020-09-19 PROCEDURE — 73590 X-RAY EXAM OF LOWER LEG: CPT

## 2020-09-19 PROCEDURE — 87040 BLOOD CULTURE FOR BACTERIA: CPT

## 2020-09-19 PROCEDURE — 99291 CRITICAL CARE FIRST HOUR: CPT

## 2020-09-19 PROCEDURE — 6370000000 HC RX 637 (ALT 250 FOR IP): Performed by: INTERNAL MEDICINE

## 2020-09-19 PROCEDURE — 6360000002 HC RX W HCPCS: Performed by: NURSE PRACTITIONER

## 2020-09-19 PROCEDURE — 85610 PROTHROMBIN TIME: CPT

## 2020-09-19 PROCEDURE — 2580000003 HC RX 258: Performed by: EMERGENCY MEDICINE

## 2020-09-19 PROCEDURE — 73700 CT LOWER EXTREMITY W/O DYE: CPT

## 2020-09-19 PROCEDURE — 6360000002 HC RX W HCPCS: Performed by: EMERGENCY MEDICINE

## 2020-09-19 PROCEDURE — 96375 TX/PRO/DX INJ NEW DRUG ADDON: CPT

## 2020-09-19 PROCEDURE — 85379 FIBRIN DEGRADATION QUANT: CPT

## 2020-09-19 PROCEDURE — 85025 COMPLETE CBC W/AUTO DIFF WBC: CPT

## 2020-09-19 PROCEDURE — 71045 X-RAY EXAM CHEST 1 VIEW: CPT

## 2020-09-19 PROCEDURE — 96365 THER/PROPH/DIAG IV INF INIT: CPT

## 2020-09-19 PROCEDURE — 2580000003 HC RX 258: Performed by: INTERNAL MEDICINE

## 2020-09-19 PROCEDURE — 84484 ASSAY OF TROPONIN QUANT: CPT

## 2020-09-19 PROCEDURE — 80053 COMPREHEN METABOLIC PANEL: CPT

## 2020-09-19 PROCEDURE — 2580000003 HC RX 258: Performed by: NURSE PRACTITIONER

## 2020-09-19 PROCEDURE — 96367 TX/PROPH/DG ADDL SEQ IV INF: CPT

## 2020-09-19 PROCEDURE — 83605 ASSAY OF LACTIC ACID: CPT

## 2020-09-19 PROCEDURE — 93005 ELECTROCARDIOGRAM TRACING: CPT | Performed by: NURSE PRACTITIONER

## 2020-09-19 PROCEDURE — 6360000002 HC RX W HCPCS: Performed by: INTERNAL MEDICINE

## 2020-09-19 RX ORDER — PROMETHAZINE HYDROCHLORIDE 25 MG/1
12.5 TABLET ORAL EVERY 6 HOURS PRN
Status: DISCONTINUED | OUTPATIENT
Start: 2020-09-19 | End: 2020-09-30 | Stop reason: HOSPADM

## 2020-09-19 RX ORDER — BUDESONIDE AND FORMOTEROL FUMARATE DIHYDRATE 80; 4.5 UG/1; UG/1
2 AEROSOL RESPIRATORY (INHALATION) 2 TIMES DAILY
Status: DISCONTINUED | OUTPATIENT
Start: 2020-09-19 | End: 2020-09-30 | Stop reason: HOSPADM

## 2020-09-19 RX ORDER — ACETAMINOPHEN 160 MG/5ML
650 SOLUTION ORAL EVERY 4 HOURS PRN
Status: DISCONTINUED | OUTPATIENT
Start: 2020-09-19 | End: 2020-09-21

## 2020-09-19 RX ORDER — ONDANSETRON 2 MG/ML
4 INJECTION INTRAMUSCULAR; INTRAVENOUS EVERY 6 HOURS PRN
Status: DISCONTINUED | OUTPATIENT
Start: 2020-09-19 | End: 2020-09-30 | Stop reason: HOSPADM

## 2020-09-19 RX ORDER — SODIUM CHLORIDE 0.9 % (FLUSH) 0.9 %
10 SYRINGE (ML) INJECTION EVERY 12 HOURS SCHEDULED
Status: DISCONTINUED | OUTPATIENT
Start: 2020-09-19 | End: 2020-09-24

## 2020-09-19 RX ORDER — 0.9 % SODIUM CHLORIDE 0.9 %
30 INTRAVENOUS SOLUTION INTRAVENOUS ONCE
Status: COMPLETED | OUTPATIENT
Start: 2020-09-19 | End: 2020-09-19

## 2020-09-19 RX ORDER — OXYCODONE HYDROCHLORIDE 5 MG/1
10 TABLET ORAL EVERY 6 HOURS PRN
Status: DISCONTINUED | OUTPATIENT
Start: 2020-09-19 | End: 2020-09-24

## 2020-09-19 RX ORDER — OXYCODONE HYDROCHLORIDE 5 MG/1
5 TABLET ORAL EVERY 6 HOURS PRN
Status: DISCONTINUED | OUTPATIENT
Start: 2020-09-19 | End: 2020-09-24

## 2020-09-19 RX ORDER — ACETAMINOPHEN 650 MG/1
650 SUPPOSITORY RECTAL EVERY 4 HOURS PRN
Status: DISCONTINUED | OUTPATIENT
Start: 2020-09-19 | End: 2020-09-21

## 2020-09-19 RX ORDER — MORPHINE SULFATE 4 MG/ML
4 INJECTION, SOLUTION INTRAMUSCULAR; INTRAVENOUS ONCE
Status: COMPLETED | OUTPATIENT
Start: 2020-09-19 | End: 2020-09-19

## 2020-09-19 RX ORDER — SODIUM CHLORIDE 0.9 % (FLUSH) 0.9 %
10 SYRINGE (ML) INJECTION PRN
Status: DISCONTINUED | OUTPATIENT
Start: 2020-09-19 | End: 2020-09-24

## 2020-09-19 RX ORDER — ACETAMINOPHEN 325 MG/1
650 TABLET ORAL EVERY 4 HOURS PRN
Status: DISCONTINUED | OUTPATIENT
Start: 2020-09-19 | End: 2020-09-30 | Stop reason: HOSPADM

## 2020-09-19 RX ORDER — HEPARIN SODIUM 5000 [USP'U]/ML
5000 INJECTION, SOLUTION INTRAVENOUS; SUBCUTANEOUS EVERY 8 HOURS SCHEDULED
Status: DISCONTINUED | OUTPATIENT
Start: 2020-09-19 | End: 2020-09-20

## 2020-09-19 RX ORDER — ALLOPURINOL 300 MG/1
300 TABLET ORAL DAILY
Status: DISCONTINUED | OUTPATIENT
Start: 2020-09-20 | End: 2020-09-30 | Stop reason: HOSPADM

## 2020-09-19 RX ORDER — SODIUM CHLORIDE 0.9 % (FLUSH) 0.9 %
10 SYRINGE (ML) INJECTION PRN
Status: DISCONTINUED | OUTPATIENT
Start: 2020-09-19 | End: 2020-09-21

## 2020-09-19 RX ADMIN — CEFEPIME HYDROCHLORIDE 2 G: 2 INJECTION, POWDER, FOR SOLUTION INTRAVENOUS at 18:41

## 2020-09-19 RX ADMIN — SODIUM CHLORIDE 2994 ML: 9 INJECTION, SOLUTION INTRAVENOUS at 18:40

## 2020-09-19 RX ADMIN — ACETAMINOPHEN 650 MG: 325 TABLET ORAL at 23:44

## 2020-09-19 RX ADMIN — VANCOMYCIN HYDROCHLORIDE 1000 MG: 1 INJECTION, POWDER, LYOPHILIZED, FOR SOLUTION INTRAVENOUS at 20:04

## 2020-09-19 RX ADMIN — HEPARIN SODIUM 5000 UNITS: 5000 INJECTION INTRAVENOUS; SUBCUTANEOUS at 23:36

## 2020-09-19 RX ADMIN — Medication 10 ML: at 23:37

## 2020-09-19 RX ADMIN — MORPHINE SULFATE 4 MG: 4 INJECTION, SOLUTION INTRAMUSCULAR; INTRAVENOUS at 20:04

## 2020-09-19 RX ADMIN — OXYCODONE 10 MG: 5 TABLET ORAL at 23:44

## 2020-09-19 ASSESSMENT — PAIN SCALES - GENERAL
PAINLEVEL_OUTOF10: 7
PAINLEVEL_OUTOF10: 6
PAINLEVEL_OUTOF10: 6
PAINLEVEL_OUTOF10: 7

## 2020-09-19 ASSESSMENT — PAIN DESCRIPTION - ORIENTATION
ORIENTATION: RIGHT

## 2020-09-19 ASSESSMENT — PAIN DESCRIPTION - LOCATION
LOCATION: KNEE

## 2020-09-19 ASSESSMENT — PAIN DESCRIPTION - PAIN TYPE
TYPE: ACUTE PAIN

## 2020-09-19 NOTE — ED NOTES
Pt to er with right knee swelling. . pt has been having issues with a meniscus tear to his right knee since January. . has surgery scheduled on the 29th of this month. . since wedsday he has had increased swelling and redness to the knee. Sharad Hart knee is noticeable red and is  warm to the touch     Trinidad Isaac RN  09/19/20 [id number]

## 2020-09-19 NOTE — H&P
[facility] Medicine History & Physical      Patient:  Uriel Bear  :   1955  MRN:   [id number]  Date of Service: 20    CHIEF COMPLAINT:  Right leg swelling, redness, feels sick    HISTORY OF PRESENT ILLNESS:    Uriel Bear is a 63 y.o. male. He presents reporting 3 days of increasing right leg redness, swelling, and pain. He began to feel constititionally ill with chills and sweats on Wednesday () and noticed the leg changes developing Thursday. Appetite is strong. Denies nausea and vomiting. He has had loose stools. Patient denies any known RLE wound or trauma. He works as a . Patient has continued to take his prescribed meloxicam and lisinopril. Review of Systems:  All pertinent positives and negatives are as noted in the HPI section. All other systems were reviewed and are negative. Past Medical History:   Diagnosis Date    Asthma     Enlarged prostate     Gout 2014    HH (hiatus hernia)     Ba. [place]    Hypertension     LVH (left ventricular hypertrophy) 2011    Echo concentric LVH    Pulmonary embolus ([address] 3/2011    After Shoulder Surgery    Rosacea     Schatzki's ring 2010    EGD s/p Dilatation    Toenail fungus     Foot and Toenail Fungus.  Tremor, essential     Positive Family History Mother.     Wears glasses     for reading       Past Surgical History:   Procedure Laterality Date    COLONOSCOPY  2010    Nl    ESOPHAGEAL DILATATION      INTRACAPSULAR CATARACT EXTRACTION Left 2020    PHACOEMULSIFICATION OF CATARACT LEFT EYE WITH INTRAOCULAR LENS IMPLANT performed by López Nicole MD at [facility] Right 2020    PHACOEMULSIFICATION OF CATARACT RIGHT EYE WITH INTRAOCULAR LENS IMPLANT performed by López Nicole MD at [place] [address] ARTHROSCOPY  3/11     right, david acromioplasty , [place], mini open rotator cuff    UPPER GASTROINTESTINAL ENDOSCOPY 06/2010    Schatzki Ring s/p Dilatation         Prior to Admission medications    Medication Sig Start Date End Date Taking? Authorizing Provider   meloxicam (MOBIC) 15 MG tablet TAKE 1 TABLET BY MOUTH ONE TIME A DAY  7/6/20  Yes Yina Cummings MD   lisinopril (PRINIVIL;ZESTRIL) 20 MG tablet Take 20 mg by mouth daily   Yes Historical Provider, MD   ADVAIR DISKUS 100-50 MCG/DOSE diskus inhaler INHALE 1 DOSE BY MOUTH TWICE DAILY. RINSE MOUTH AFTER USE 12/4/15  Yes Will Roland MD   allopurinol (ZYLOPRIM) 300 MG tablet 1/2 pill a day. For gout prevention. Patient taking differently: 300 mg For gout prevention. 9/23/14  Yes Cristin Caba MD       Allergies:   Patient has no known allergies. Social:   reports that he has never smoked. He has never used smokeless tobacco.   reports previous alcohol use. Social History     Substance and Sexual Activity   Drug Use No       Family History   Problem Relation Age of Onset    Stroke Father     Asthma Father     Dementia Father     Pacemaker Mother     Cancer Sister         Breast CA    Cancer Sister         Cervical CA       PHYSICAL EXAM:  I performed this physical examination. Vitals:  Patient Vitals for the past 24 hrs:   BP Temp Temp src Pulse Resp SpO2 Height Weight   09/19/20 1828 92/64 -- -- 99 20 100 % -- --   09/19/20 1702 99/66 98.1 °F (36.7 °C) Oral 94 20 100 % 6' (1.829 m) 220 lb (99.8 kg)     GEN:  Appearance:  Age appropriate, generally healthy appearing male. He looks uncomfortable . Level of Consciousness:  alert . Orientation:  full    HEENT: Sclera anicteric.  no conjunctival chemosis. tacky mucus membranes. nono specific or diagnostic oral lesions. NECK:  no signs of meningismus. Jugular veins not distended. Carotid pulses  2+  no cervical lymphadenopathy. no thyromegaly. CV:  regular rhythm. normal S1 & S2.    no murmur. no rub.  no gallop. PULM:  Chest excursion is symmetric. Breath sounds are vesicular. pain and redness, poss cellulitis, r/o osteo Acuity: Acute Type of Exam: Initial   FINDINGS: There is no evidence of acute fracture or dislocation of the right tibia fibula. The joint spaces are intact. No evidence of destructive osseous lesion. There is soft tissue swelling. No evidence of soft tissue gas or radiopaque foreign body. I directly reviewed all recent imaging studies as well as pertinent prior studies. Radiology reports may or may not be available at the time of my review. My comments and findings are as follows:  CT RLE reviewed. No bony lesions. No soft tissue gas or discrete abscess. Extensive soft tissue induration. EKG:  New and pertinent prior tracings were directly reviewed. My interpretation is as follows:  NSR w/ IRBBB    Active Hospital Problems    Diagnosis Date Noted    History of pulmonary embolism [Z86.711]      Priority: High    Cellulitis of right lower extremity [L03.115] 09/19/2020    ULISES (acute kidney injury) ([facility] [N17.9] 09/19/2020    Sepsis with acute organ dysfunction ([facility] [A41.9, R65.20] 09/19/2020    Hyponatremia [E87.1] 09/19/2020       ASSESSMENT & PLAN  Sepsis w/ acute organ dysfunction  -  RLE SSTI (unlikely staphylococcal based on appearance)  -  Cultures: Blood, ASO and anti-DNAse B titers  -  Empiric abx: ceftriaxone, vancomycin  (received cefepime and vancomycin in the ER). -  Resus: 3L NS given in ER. Will reassess once complete. Acute kidney injury, hyponatremia  -  Baseline creatinine ~ 1.2, now up to 3.1.  -  Bladder scan and straight cath if needed to get U/A, urine creat, sodium urea, osm.  -  Hold lisinopril and meloxcam.  -  Crystalloid resuscitation as above. H/O PE  -  No RLE DVT apparent by bedside exam. DDimer is elevated which is nonspecific. -  Formal duplex venous U/S requested.     DVT prophylaxis: S.c. UFH  Code Status:  Full  Disposition:  Inpatient    Saumya Martinez MD

## 2020-09-19 NOTE — ED NOTES

## 2020-09-19 NOTE — ED PROVIDER NOTES
I independently performed a history and physical on Raúl Rosas. All diagnostic, treatment, and disposition decisions were made by myself in conjunction with the advanced practice provider. I have participated in the medical decision making and directed the treatment plan and disposition of the patient. For further details of Raúl Rosas emergency department encounter, please see the advanced practice provider's documentation. CHIEF COMPLAINT  Chief Complaint   Patient presents with    Leg Swelling     right leg swelling since Wednesday, due for knee surgery in same leg on 9/29       Briefly, Raúl Rosas is a 63 y.o. male  who presents to the ED complaining of right leg pain and swelling. States that earlier in the week he placed a particularly hot heating pad on his right knee for arthritis pain. He notes that he thinks he burned himself with an exceedingly hot heating pad. He is currently complaining of 10/10 throbbing aching pain in the right lower extremity with redness and swelling. States he feels very weak and ill. He has been nauseous. He gets lightheaded and dizzy and does have some shortness of breath. FOCUSED PHYSICAL EXAMINATION  /72   Pulse 98   Temp 99.2 °F (37.3 °C) (Oral)   Resp 25   Ht 6' (1.829 m)   Wt 220 lb (99.8 kg)   SpO2 (!) 87%   BMI 29.84 kg/m²      Focused physical examination:  General appearance:  Cooperative. Ill and uncomfortable appearing  Skin:  Warm. Dry. Significant erythema and edema to the right lower extremity from the ankle up to the medial lateral thigh. Tenderness to palpation even to light touch throughout the medial and lateral thigh. No overt crepitus to the skin. No necrosis of the skin. Eye:  Extraocular movements intact. Ears, nose, mouth and throat:  Oral mucosa moist,  Neck:  Trachea midline. Heart: Tachycardic but regular.   2+ dorsalis pedis pulses bilaterally  Perfusion:  intact  Respiratory: Lungs clear to auscultation bilaterally. Respirations nonlabored. Abdominal:   Non distended. Nontender  Neurological:  Alert and oriented x 3. Moves all extremities spontaneously  Musculoskeletal:   Normal ROM, no deformities. Normal range of motion of the right knee and ankle          Psychiatric:  Normal mood      EKG: Sinus rhythm rate of 94 bpm with incomplete right bundle branch block and evidence of LVH. No ST elevation. Overall similar to prior from 6/13/2014    MDM: Patient presents emergency department today with right leg pain. Very clear that he has significant cellulitis of the right lower extremity. He was hypotensive, tachycardic has significant leukocytosis and elevated lactic acidosis meets sepsis criteria. Transfuse large-volume IV fluids and started on broad-spectrum antibiotics. While infected DVT and possible PE is theoretically possible, I think this is likely due to a burn sustained from a heating pad. Due to his new acute renal failure cannot perform CT angiogram.  I was concerned for possible necrotizing fasciitis due to the pain out of proportion on exam and did perform a CT scan of the patient's leg which thankfully showed only evidence of cellulitis. Patient will be admitted to the hospital for continued treatment    CRITICAL CARE TIME   Total Critical Care time was 30 minutes, excluding separately reportable procedures. There was a high probability of clinically significant/life threatening deterioration in the patient's condition which required my urgent intervention.   This time was spent reviewing the patient chart, interpreting diagnostic/laboratory data, transfusion of large-volume IV fluids and broad-spectrum antibiotics and treatment for sepsis secondary to cellulitis with acute renal failure      During the patient's ED course, the patient was given:  Medications   sodium chloride flush 0.9 % injection 10 mL (has no administration in time range)   sodium chloride limitations of this technology.             Etta Downing MD  09/19/20 [id number]

## 2020-09-19 NOTE — ED PROVIDER NOTES
INTRAOCULAR LENS IMPLANT performed by Ale Del Castillo MD at [place] [address] ARTHROSCOPY  3/11     right, david [place] , [place], mini open rotator cuff    UPPER GASTROINTESTINAL ENDOSCOPY  06/2010    Schatzki Ring s/p Dilatation     Family History   Problem Relation Age of Onset    Stroke Father     Asthma Father     Dementia Father     Pacemaker Mother     Cancer Sister         Breast CA    Cancer Sister         Cervical CA     Social History     Socioeconomic History    Marital status:      Spouse name: Not on file    Number of children: Not on file    Years of education: Not on file    Highest education level: Not on file   Occupational History    Not on file   Social Needs    Financial resource strain: Not on file    Food insecurity     Worry: Not on file     Inability: Not on file    Transportation needs     Medical: Not on file     Non-medical: Not on file   Tobacco Use    Smoking status: Never Smoker    Smokeless tobacco: Never Used   Substance and Sexual Activity    Alcohol use: Not Currently     Comment: rarely    Drug use: No    Sexual activity: Not on file   Lifestyle    Physical activity     Days per week: Not on file     Minutes per session: Not on file    Stress: Not on file   Relationships    Social connections     Talks on phone: Not on file     Gets together: Not on file     Attends Buddhist service: Not on file     Active member of club or organization: Not on file     Attends meetings of clubs or organizations: Not on file     Relationship status: Not on file    Intimate partner violence     Fear of current or ex partner: Not on file     Emotionally abused: Not on file     Physically abused: Not on file     Forced sexual activity: Not on file   Other Topics Concern    Not on file   Social History Narrative    Not on file     No current facility-administered medications for this encounter.       Current Outpatient Medications   Medication Sig Dispense Refill    meloxicam (MOBIC) 15 MG tablet TAKE 1 TABLET BY MOUTH ONE TIME A DAY  90 tablet 0    lisinopril (PRINIVIL;ZESTRIL) 20 MG tablet Take 20 mg by mouth daily      ADVAIR DISKUS 100-50 MCG/DOSE diskus inhaler INHALE 1 DOSE BY MOUTH TWICE DAILY. RINSE MOUTH AFTER USE 1 Inhaler 1    allopurinol (ZYLOPRIM) 300 MG tablet 1/2 pill a day. For gout prevention. (Patient taking differently: 300 mg For gout prevention. ) 15 tablet 6     No Known Allergies    REVIEW OF SYSTEMS  10 systems reviewed, pertinent positives per HPI otherwise noted to be negative    PHYSICAL EXAM  BP 99/66   Pulse 94   Temp 98.1 °F (36.7 °C) (Oral)   Resp 20   Ht 6' (1.829 m)   Wt 220 lb (99.8 kg)   SpO2 100%   BMI 29.84 kg/m²   GENERAL APPEARANCE: Awake and alert. Cooperative.  + Mild distress. HEAD: Normocephalic. Atraumatic. EYES: PERRL. EOM's grossly intact. ENT: Mucous membranes are moist.   NECK: Supple. HEART: RRR. No murmurs. LUNGS: Respirations labored. CTAB. Fair air exchange. Not speaking comfortably in full sentences. ABDOMEN: Soft. + distended. Non-tender. No guarding or rebound. No midline pulsatile abdominal. No organomegaly. EXTREMITIES:  Moves all extremities equally. All extremities neurovascularly intact. Negative Homans sign bilaterally. + DP and PT pulses with brisk cap refill. SKIN: Warm and dry. + Right lower extremity edema, erythema, and warmth from ankle to mid/lateral thigh. NEUROLOGICAL: Alert and oriented. CN's 2-12 intact. No gross facial drooping. Strength 5/5, sensation intact. PSYCHIATRIC: Normal mood and affect. RADIOLOGY  No results found. ED COURSE  Patient did not require analgesia while in the emergency department. Triage vitals stable but guarded, patient is hypotensive at 92/64.       Labs ordered:  D-dimer: 1746  Troponin: <0.01  Pro time INR: 16.2 and 1.39 respectively; otherwise unremarkable  CMP: Sodium 124, chloride 92, CO2 19, glucose 121, BUN 68, creatinine 3.1, GFR 20, bilirubin 2.4, AST 11; otherwise unremarkable  CBC: There is leukocytosis WBC 19.2, there is no anemia, neutrophils absolute 16.7, lymphocytes absolute 0.8, monocytes absolute 1.7, and there is bandemia at 17%. Lactic acid: 2.6  Blood culture #1: Pending  Blood culture #2: Pending  Urinalysis reflex to culture: Pending      EKG: As interpreted by EMD.  Normal sinus rhythm. Incomplete right bundle branch block. Minimal voltage criteria for LVH, may be Normal variant. Borderline ECG compared to ECG of 3/29/2011. Imaging ordered:  XR right tib-fib: No acute or dislocation in the right tibia-fibula obstructive osseous lesion. Soft tissue swelling. No soft tissue gas or radiographic foreign body. CT Femur: Subcutaneous fat stranding of the right lower extremity compatible with cellulitis. No drainable fluid collection, soft tissue gas, or evidence of fasciitis. Small nonspecific right knee effusion with mild medial compartment degenerative changes. CXR: No acute cardiopulmonary abnormality. Considered CT pulmonary pulmonary embolism but since this imaging requires IV contrast, this imaging was not obtained. Interventions:  Sepsis criteria was initiated and patient was given 30 mg/kg of normal saline, antibiotics started after lactic and blood cultures x2 were obtained with consideration for renal failure, therefore vancomycin and cefepime were ordered. Patient was given morphine for pain. Reevaluation:  Patient reports improvement in his pain      A discussion was had with Mr. Grande regarding cellulitis of right lower extremity, septicemia, acute renal failure, and intention to admit. Risk management discussed and shared decision making had with patient and/or surrogate. All questions were answered. Patient be admitted to the hospital in stable but guarded condition.     CRITICAL CARE TIME  30 Minutes of critical care time spent not including separately billable procedures. MDM  Patient presents to the emergency department with symptoms concerning for cellulitis of right lower extremity. Considered erysipelas, necrotizing fasciitis, DVT, and osteomyelitis but less likely based on history and physical.    -Considered erysipelas but onset was gradual and without fever and chills but did have some sweats, the erythema has no demarcated edges and no central clearing.  -considered necrotizing fasciitis and osteomyelitis but imaging reveals no gas or osseous abnormality.  -Considered PE but imaging via CT pulmonary was not performed due to patient's ULISES and need for use of nephrotoxic IV contrast.  -Considered DVT but imaging is not available after hours and cellulitis is more likely cause of symptoms. Clinical Impression:  Cellulitis of right lower extremity  Septicemia  Acute renal injury      Disposition:  Admitted      Patient will be admitted to hospital for further evaluation and treatment.        Hospitalist: Dr. Barbara Saucedo      Discussed patients HPI, ED work-up, results, treatment, and response with my attending physician Dr. Rodri Parada and the Hospitalist -Dr. Barbara Saucedo who agrees to admit the patient to the hospital.                  RERE North - CNP  09/20/20 6724

## 2020-09-19 NOTE — ED NOTES

## 2020-09-20 LAB
AMORPHOUS: ABNORMAL /HPF
ANION GAP SERPL CALCULATED.3IONS-SCNC: 12 MMOL/L (ref 3–16)
ANION GAP SERPL CALCULATED.3IONS-SCNC: 13 MMOL/L (ref 3–16)
ANISOCYTOSIS: ABNORMAL
ANTISTREPTOLYSIN-O: <20 IU/ML (ref 0–200)
APTT: 25.3 SEC (ref 24.2–36.2)
APTT: 29.6 SEC (ref 24.2–36.2)
BACTERIA: ABNORMAL /HPF
BANDED NEUTROPHILS RELATIVE PERCENT: 38 % (ref 0–7)
BASOPHILS ABSOLUTE: 0 K/UL (ref 0–0.2)
BASOPHILS RELATIVE PERCENT: 0 %
BILIRUBIN URINE: NEGATIVE
BLOOD, URINE: ABNORMAL
BUN BLDV-MCNC: 68 MG/DL (ref 7–20)
BUN BLDV-MCNC: 69 MG/DL (ref 7–20)
CALCIUM SERPL-MCNC: 8.2 MG/DL (ref 8.3–10.6)
CALCIUM SERPL-MCNC: 8.5 MG/DL (ref 8.3–10.6)
CHLORIDE BLD-SCNC: 96 MMOL/L (ref 99–110)
CHLORIDE BLD-SCNC: 98 MMOL/L (ref 99–110)
CLARITY: ABNORMAL
CO2: 17 MMOL/L (ref 21–32)
CO2: 20 MMOL/L (ref 21–32)
COLOR: YELLOW
CREAT SERPL-MCNC: 2.4 MG/DL (ref 0.8–1.3)
CREAT SERPL-MCNC: 2.7 MG/DL (ref 0.8–1.3)
CREATININE URINE: 113.3 MG/DL (ref 39–259)
DOHLE BODIES: PRESENT
EKG ATRIAL RATE: 94 BPM
EKG DIAGNOSIS: NORMAL
EKG P AXIS: 41 DEGREES
EKG P-R INTERVAL: 148 MS
EKG Q-T INTERVAL: 334 MS
EKG QRS DURATION: 102 MS
EKG QTC CALCULATION (BAZETT): 417 MS
EKG R AXIS: -24 DEGREES
EKG T AXIS: 54 DEGREES
EKG VENTRICULAR RATE: 94 BPM
EOSINOPHILS ABSOLUTE: 0 K/UL (ref 0–0.6)
EOSINOPHILS RELATIVE PERCENT: 0 %
GFR AFRICAN AMERICAN: 29
GFR AFRICAN AMERICAN: 33
GFR NON-AFRICAN AMERICAN: 24
GFR NON-AFRICAN AMERICAN: 27
GLUCOSE BLD-MCNC: 119 MG/DL (ref 70–99)
GLUCOSE BLD-MCNC: 131 MG/DL (ref 70–99)
GLUCOSE URINE: NEGATIVE MG/DL
HCT VFR BLD CALC: 37.8 % (ref 40.5–52.5)
HEMOGLOBIN: 12.8 G/DL (ref 13.5–17.5)
KETONES, URINE: NEGATIVE MG/DL
LEUKOCYTE ESTERASE, URINE: NEGATIVE
LYMPHOCYTES ABSOLUTE: 0.7 K/UL (ref 1–5.1)
LYMPHOCYTES RELATIVE PERCENT: 5 %
MAGNESIUM: 1.9 MG/DL (ref 1.8–2.4)
MCH RBC QN AUTO: 29.5 PG (ref 26–34)
MCHC RBC AUTO-ENTMCNC: 33.8 G/DL (ref 31–36)
MCV RBC AUTO: 87.3 FL (ref 80–100)
MICROSCOPIC EXAMINATION: YES
MONOCYTES ABSOLUTE: 0.5 K/UL (ref 0–1.3)
MONOCYTES RELATIVE PERCENT: 4 %
NEUTROPHILS ABSOLUTE: 12.2 K/UL (ref 1.7–7.7)
NEUTROPHILS RELATIVE PERCENT: 53 %
NITRITE, URINE: NEGATIVE
OSMOLALITY URINE: 513 MOSM/KG (ref 390–1070)
PDW BLD-RTO: 13.6 % (ref 12.4–15.4)
PH UA: 5 (ref 5–8)
PLATELET # BLD: 152 K/UL (ref 135–450)
PMV BLD AUTO: 8.5 FL (ref 5–10.5)
POTASSIUM SERPL-SCNC: 3.6 MMOL/L (ref 3.5–5.1)
POTASSIUM SERPL-SCNC: 3.6 MMOL/L (ref 3.5–5.1)
PROTEIN UA: 30 MG/DL
RBC # BLD: 4.33 M/UL (ref 4.2–5.9)
RBC UA: ABNORMAL /HPF (ref 0–4)
SODIUM BLD-SCNC: 127 MMOL/L (ref 136–145)
SODIUM BLD-SCNC: 129 MMOL/L (ref 136–145)
SODIUM URINE: <20 MMOL/L
SPECIFIC GRAVITY UA: 1.02 (ref 1–1.03)
STREPTOZYME: NEGATIVE
TOXIC GRANULATION: PRESENT
UREA NITROGEN, UR: 1037.2 MG/DL (ref 800–1666)
URINE REFLEX TO CULTURE: ABNORMAL
URINE TYPE: ABNORMAL
UROBILINOGEN, URINE: 0.2 E.U./DL
VANCOMYCIN RANDOM: <4 UG/ML
WBC # BLD: 13.4 K/UL (ref 4–11)
WBC UA: ABNORMAL /HPF (ref 0–5)

## 2020-09-20 PROCEDURE — 6370000000 HC RX 637 (ALT 250 FOR IP): Performed by: INTERNAL MEDICINE

## 2020-09-20 PROCEDURE — 85730 THROMBOPLASTIN TIME PARTIAL: CPT

## 2020-09-20 PROCEDURE — 84540 ASSAY OF URINE/UREA-N: CPT

## 2020-09-20 PROCEDURE — 2580000003 HC RX 258: Performed by: NURSE PRACTITIONER

## 2020-09-20 PROCEDURE — 2580000003 HC RX 258: Performed by: INTERNAL MEDICINE

## 2020-09-20 PROCEDURE — 2500000003 HC RX 250 WO HCPCS: Performed by: NURSE PRACTITIONER

## 2020-09-20 PROCEDURE — 83935 ASSAY OF URINE OSMOLALITY: CPT

## 2020-09-20 PROCEDURE — 84300 ASSAY OF URINE SODIUM: CPT

## 2020-09-20 PROCEDURE — 86215 DEOXYRIBONUCLEASE ANTIBODY: CPT

## 2020-09-20 PROCEDURE — 93010 ELECTROCARDIOGRAM REPORT: CPT | Performed by: INTERNAL MEDICINE

## 2020-09-20 PROCEDURE — 81001 URINALYSIS AUTO W/SCOPE: CPT

## 2020-09-20 PROCEDURE — 86060 ANTISTREPTOLYSIN O TITER: CPT

## 2020-09-20 PROCEDURE — 94640 AIRWAY INHALATION TREATMENT: CPT

## 2020-09-20 PROCEDURE — 1200000000 HC SEMI PRIVATE

## 2020-09-20 PROCEDURE — 83735 ASSAY OF MAGNESIUM: CPT

## 2020-09-20 PROCEDURE — 82570 ASSAY OF URINE CREATININE: CPT

## 2020-09-20 PROCEDURE — 80202 ASSAY OF VANCOMYCIN: CPT

## 2020-09-20 PROCEDURE — 6360000002 HC RX W HCPCS: Performed by: NURSE PRACTITIONER

## 2020-09-20 PROCEDURE — 86063 ANTISTREPTOLYSIN O SCREEN: CPT

## 2020-09-20 PROCEDURE — 6360000002 HC RX W HCPCS: Performed by: INTERNAL MEDICINE

## 2020-09-20 PROCEDURE — 36415 COLL VENOUS BLD VENIPUNCTURE: CPT

## 2020-09-20 PROCEDURE — 85025 COMPLETE CBC W/AUTO DIFF WBC: CPT

## 2020-09-20 PROCEDURE — 80048 BASIC METABOLIC PNL TOTAL CA: CPT

## 2020-09-20 RX ORDER — SODIUM CHLORIDE 9 MG/ML
INJECTION, SOLUTION INTRAVENOUS CONTINUOUS
Status: DISCONTINUED | OUTPATIENT
Start: 2020-09-20 | End: 2020-09-21

## 2020-09-20 RX ORDER — HEPARIN SODIUM 1000 [USP'U]/ML
6900 INJECTION, SOLUTION INTRAVENOUS; SUBCUTANEOUS ONCE
Status: COMPLETED | OUTPATIENT
Start: 2020-09-20 | End: 2020-09-20

## 2020-09-20 RX ORDER — TAMSULOSIN HYDROCHLORIDE 0.4 MG/1
0.4 CAPSULE ORAL NIGHTLY
COMMUNITY
Start: 2020-02-14

## 2020-09-20 RX ORDER — HEPARIN SODIUM 1000 [USP'U]/ML
3500 INJECTION, SOLUTION INTRAVENOUS; SUBCUTANEOUS PRN
Status: DISCONTINUED | OUTPATIENT
Start: 2020-09-20 | End: 2020-09-21

## 2020-09-20 RX ORDER — HEPARIN SODIUM 1000 [USP'U]/ML
6900 INJECTION, SOLUTION INTRAVENOUS; SUBCUTANEOUS PRN
Status: DISCONTINUED | OUTPATIENT
Start: 2020-09-20 | End: 2020-09-21

## 2020-09-20 RX ORDER — HEPARIN SODIUM 10000 [USP'U]/100ML
29.6 INJECTION, SOLUTION INTRAVENOUS CONTINUOUS
Status: DISCONTINUED | OUTPATIENT
Start: 2020-09-20 | End: 2020-09-21

## 2020-09-20 RX ADMIN — HEPARIN SODIUM 5000 UNITS: 5000 INJECTION INTRAVENOUS; SUBCUTANEOUS at 06:00

## 2020-09-20 RX ADMIN — HEPARIN SODIUM 6900 UNITS: 1000 INJECTION INTRAVENOUS; SUBCUTANEOUS at 12:29

## 2020-09-20 RX ADMIN — Medication 10 ML: at 19:31

## 2020-09-20 RX ADMIN — Medication 10 ML: at 08:09

## 2020-09-20 RX ADMIN — Medication 2 PUFF: at 07:26

## 2020-09-20 RX ADMIN — Medication 2 PUFF: at 20:26

## 2020-09-20 RX ADMIN — SODIUM CHLORIDE: 9 INJECTION, SOLUTION INTRAVENOUS at 20:20

## 2020-09-20 RX ADMIN — CEFTRIAXONE SODIUM 1 G: 1 INJECTION, POWDER, FOR SOLUTION INTRAMUSCULAR; INTRAVENOUS at 18:16

## 2020-09-20 RX ADMIN — SODIUM CHLORIDE: 9 INJECTION, SOLUTION INTRAVENOUS at 08:44

## 2020-09-20 RX ADMIN — VANCOMYCIN HYDROCHLORIDE 1.5 G: 10 INJECTION, POWDER, LYOPHILIZED, FOR SOLUTION INTRAVENOUS at 23:08

## 2020-09-20 RX ADMIN — OXYCODONE 10 MG: 5 TABLET ORAL at 20:56

## 2020-09-20 RX ADMIN — HEPARIN SODIUM 15.6 ML/HR: 10000 INJECTION, SOLUTION INTRAVENOUS at 12:29

## 2020-09-20 RX ADMIN — OXYCODONE 10 MG: 5 TABLET ORAL at 06:00

## 2020-09-20 RX ADMIN — HEPARIN SODIUM 6900 UNITS: 1000 INJECTION INTRAVENOUS; SUBCUTANEOUS at 19:31

## 2020-09-20 RX ADMIN — ALLOPURINOL 300 MG: 300 TABLET ORAL at 08:08

## 2020-09-20 RX ADMIN — CEFTRIAXONE SODIUM 1 G: 1 INJECTION, POWDER, FOR SOLUTION INTRAMUSCULAR; INTRAVENOUS at 06:00

## 2020-09-20 ASSESSMENT — PAIN DESCRIPTION - PAIN TYPE
TYPE: ACUTE PAIN

## 2020-09-20 ASSESSMENT — PAIN SCALES - GENERAL
PAINLEVEL_OUTOF10: 6
PAINLEVEL_OUTOF10: 7
PAINLEVEL_OUTOF10: 8
PAINLEVEL_OUTOF10: 7
PAINLEVEL_OUTOF10: 5

## 2020-09-20 ASSESSMENT — PAIN DESCRIPTION - ORIENTATION
ORIENTATION: RIGHT

## 2020-09-20 ASSESSMENT — PAIN DESCRIPTION - LOCATION
LOCATION: KNEE

## 2020-09-20 NOTE — CONSULTS
>> Sheeba Jackson [facility] 9/20/2020 19:20  Aptt = 29.6sec at 1815. Give 5900unit bolus and increase drip to 19.1ml/hr. Next aptt at 0130 9/21  Sheeba Renetta [facility]  9/20/2020 at 7:20 PM      >> Scott Dumont [facility] 9/20/2020 11:59  Pharmacy to Manage Heparin Infusion per Hospital Policy    Pt adjusted wt: 86.5 kg  Baseline aPTT = pending    High Dose Heparin Infusion  Heparin 6900 unit IVP bolus followed by Heparin infusion at 15.6 ml/hr. Recheck aPTT in 6 hours. Goal aPTT = 49-76 seconds. JUNI Harris Ph. 9/20/2020 11:56 AM    9/21 0208  aptt = 28.9 sec  6900 bolus; rate = 22.6 ml/hr  Next aptt 0900  Chanel Caicedo Pharm D.9/21/2020 3:17 AM    9/21 0850  APTT - 30.7 sec.  6900 unit bolus given  Increase heparin drip rate to 26.1 ml/hr  Next aPTT - 9/21 1395 S Rishi Owens PharmD 09/21/20 9:52 A    9/21 @ 1544  APTT = 29.8 sec  Increase heparin to 29.6 ml/hr Bolus Heparin 6900 units  Next aPTT at 3131 [facility] 9/21/2020 5:21 PM

## 2020-09-20 NOTE — CONSULTS
Pharmacy to Dose Vancomycin    Dx: Cellulitis  Goal trough = > 10 mcg/mL  Pt wt = 99.8 kg  Estimated Creatinine Clearance: 30 mL/min (A) (based on SCr of 3.1 mg/dL (H)). Vancomycin 1000 mg IVPB x 1 in ED at 2004. Based on patients renal function, will pulse dose to maintain levels > 15 mcg/mL. Vancomycin random 24 hours post dose (9/20 at 2100). Gabriela CastellonD  9/19/2020 10:00 PM    Vanc < 4 at 2105. Give Vancomycin 1500mg IVPB x 1.  Vanc level ordered for 1700 9/21 about 18 hours post dose  Gabriela Renner [facility]  9/20/2020 at 10:57 PM

## 2020-09-20 NOTE — PLAN OF CARE
Problem: Falls - Risk of:  Goal: Will remain free from falls  Outcome: Ongoing     Problem: Pain:  Goal: Pain level will decrease  Outcome: Ongoing     . Mari Lozago Bed in lowest position, wheels locked, 2/4 side rails up, nonskid footwear on. Bed/ chair check alarm in place, call light within reach. Pt instructed to call out when needing assistance. Pt stated understanding. Nurse will continue to monitor. Marsimon Oakley .Pt scoring pain on 0-10 scale. Pain medications given per MAR. Pt instructed to call out when pain level increasing. Call light within reach. Nurse will continue to reassess and monitor.

## 2020-09-20 NOTE — PROGRESS NOTES
Pharmacy to Manage Heparin Infusion per Hospital Policy    Pt adjusted wt: 86.5 kg    High Dose Heparin Infusion  Heparin 6900 unit IVP bolus followed by Heparin infusion at 15.6 ml/hr. Recheck aPTT in 6 hours. Goal aPTT = 49-76 seconds. JUNI Aragon Ph. 9/20/2020 11:56 AM

## 2020-09-20 NOTE — PROGRESS NOTES
full range of motion. No jugular venous distention. Trachea midline. Respiratory:  Normal respiratory effort. Clear to auscultation, bilaterally without Rales/Wheezes/Rhonchi. Cardiovascular: Regular rate and rhythm with normal S1/S2 without murmurs, rubs or gallops. Abdomen: Soft, non-tender, non-distended with normal bowel sounds. Musculoskeletal: No clubbing, cyanosis or edema bilaterally. Full range of motion without deformity. Skin: Right lower extremity very swollen, tender to light palpation. Neurologic:  Neurovascularly intact without any focal sensory/motor deficits. Cranial nerves: II-XII intact, grossly non-focal.  Psychiatric: Alert and oriented, thought content appropriate, normal insight  Capillary Refill: Brisk,< 3 seconds   Peripheral Pulses: +2 palpable, equal bilaterally       Labs:   Recent Labs     09/19/20  1711 09/20/20  0709   WBC 19.2* 13.4*   HGB 14.3 12.8*   HCT 42.0 37.8*    152     Recent Labs     09/19/20  1711 09/20/20  0006 09/20/20  0709   * 127* 129*   K 3.8 3.6 3.6   CL 92* 98* 96*   CO2 19* 17* 20*   BUN 68* 68* 69*   CREATININE 3.1* 2.4* 2.7*   CALCIUM 9.5 8.2* 8.5     Recent Labs     09/19/20  1711   AST 11*   ALT 15   BILITOT 2.4*   ALKPHOS 93     Recent Labs     09/19/20  1711   INR 1.39*     Recent Labs     09/19/20  1711   TROPONINI <0.01       Urinalysis:      Lab Results   Component Value Date    NITRU Negative 09/20/2020    WBCUA 0-2 09/20/2020    BACTERIA 2+ 09/20/2020    RBCUA 0-2 09/20/2020    BLOODU TRACE-INTACT 09/20/2020    SPECGRAV 1.020 09/20/2020    GLUCOSEU Negative 09/20/2020    GLUCOSEU NEGATIVE 03/29/2011       Radiology:  CT FEMUR RIGHT WO CONTRAST   Final Result   1. Subcutaneous fat stranding of the right lower extremity compatible with   cellulitis. No drainable fluid collection, soft tissue gas, or evidence of   fasciitis. 2. Small nonspecific right knee effusion with mild medial compartment   degenerative changes.          XR TIBIA FIBULA RIGHT (2 VIEWS)   Final Result   No acute fracture or dislocation in the right tibia fibula. No destructive   osseous lesion. Soft tissue swelling. No soft tissue gas or radiopaque foreign body. XR CHEST PORTABLE   Final Result   No acute cardiopulmonary abnormality. VL Venous Lower Evaluation    (Results Pending)   US RENAL COMPLETE    (Results Pending)           Assessment/Plan:    Active Hospital Problems    Diagnosis    History of pulmonary embolism [Z86.711]     Priority: High    Cellulitis of right lower extremity [L03.115]    [address]ISES (acute kidney injury) ([facility] [address] [N17.9]    Sepsis with acute organ dysfunction ([facility] [address] [A41.9, R65.20]    Hyponatremia [E87.1]     Sepsis w/ acute organ dysfunction  - RLE SSTI (unlikely staphylococcal based on appearance)  - Cultures: Blood, ASO and anti-DNAse B titers  - Empiric abx: ceftriaxone, vancomycin  (received cefepime and vancomycin in the ER). Acute kidney injury, hyponatremia  - Baseline creatinine ~ 1.2, up to 3.1.  - Hold lisinopril and meloxcam.  - continue IVF and follow BMP. - renal US ordered.     H/O PE with significantly elevated d-dimer  - start Heparin gtt until DVT ruled out.  - f/u vascular US.     - unable to complete CTPA due to ULISES.     DVT Prophylaxis: Heparin  Diet: DIET CARDIAC;  Code Status: Full Code    PT/OT Eval Status: hold for now    Dispo - likely another 1-2 days inpatient    Rafael Yanes, APRN - CNP

## 2020-09-21 ENCOUNTER — APPOINTMENT (OUTPATIENT)
Dept: ULTRASOUND IMAGING | Age: 65
DRG: 872 | End: 2020-09-21
Payer: COMMERCIAL

## 2020-09-21 ENCOUNTER — APPOINTMENT (OUTPATIENT)
Dept: VASCULAR LAB | Age: 65
DRG: 872 | End: 2020-09-21
Payer: COMMERCIAL

## 2020-09-21 ENCOUNTER — APPOINTMENT (OUTPATIENT)
Dept: NUCLEAR MEDICINE | Age: 65
DRG: 872 | End: 2020-09-21
Payer: COMMERCIAL

## 2020-09-21 LAB
AMORPHOUS: ABNORMAL /HPF
ANION GAP SERPL CALCULATED.3IONS-SCNC: 10 MMOL/L (ref 3–16)
APTT: 28.9 SEC (ref 24.2–36.2)
APTT: 29.8 SEC (ref 24.2–36.2)
APTT: 30.7 SEC (ref 24.2–36.2)
BACTERIA: ABNORMAL /HPF
BANDED NEUTROPHILS RELATIVE PERCENT: 20 % (ref 0–7)
BASOPHILS ABSOLUTE: 0 K/UL (ref 0–0.2)
BASOPHILS RELATIVE PERCENT: 0 %
BILIRUBIN URINE: NEGATIVE
BLOOD, URINE: ABNORMAL
BUN BLDV-MCNC: 68 MG/DL (ref 7–20)
CALCIUM SERPL-MCNC: 8.3 MG/DL (ref 8.3–10.6)
CHLORIDE BLD-SCNC: 97 MMOL/L (ref 99–110)
CLARITY: CLEAR
CO2: 17 MMOL/L (ref 21–32)
COLOR: YELLOW
CREAT SERPL-MCNC: 2.2 MG/DL (ref 0.8–1.3)
CREATININE URINE: 94 MG/DL (ref 39–259)
DOHLE BODIES: PRESENT
EOSINOPHILS ABSOLUTE: 0 K/UL (ref 0–0.6)
EOSINOPHILS RELATIVE PERCENT: 0 %
GFR AFRICAN AMERICAN: 37
GFR NON-AFRICAN AMERICAN: 30
GLUCOSE BLD-MCNC: 130 MG/DL (ref 70–99)
GLUCOSE URINE: NEGATIVE MG/DL
HCT VFR BLD CALC: 34.5 % (ref 40.5–52.5)
HEMOGLOBIN: 11.5 G/DL (ref 13.5–17.5)
KETONES, URINE: NEGATIVE MG/DL
LEUKOCYTE ESTERASE, URINE: NEGATIVE
LYMPHOCYTES ABSOLUTE: 0.8 K/UL (ref 1–5.1)
LYMPHOCYTES RELATIVE PERCENT: 5 %
MAGNESIUM: 2 MG/DL (ref 1.8–2.4)
MCH RBC QN AUTO: 29.5 PG (ref 26–34)
MCHC RBC AUTO-ENTMCNC: 33.4 G/DL (ref 31–36)
MCV RBC AUTO: 88.4 FL (ref 80–100)
MICROSCOPIC EXAMINATION: YES
MONOCYTES ABSOLUTE: 1.4 K/UL (ref 0–1.3)
MONOCYTES RELATIVE PERCENT: 9 %
NEUTROPHILS ABSOLUTE: 13.4 K/UL (ref 1.7–7.7)
NEUTROPHILS RELATIVE PERCENT: 66 %
NITRITE, URINE: NEGATIVE
PDW BLD-RTO: 13.6 % (ref 12.4–15.4)
PH UA: 5.5 (ref 5–8)
PLATELET # BLD: 151 K/UL (ref 135–450)
PLATELET SLIDE REVIEW: ADEQUATE
PMV BLD AUTO: 8.4 FL (ref 5–10.5)
POTASSIUM SERPL-SCNC: 3.7 MMOL/L (ref 3.5–5.1)
PROTEIN PROTEIN: 51 MG/DL
PROTEIN UA: 30 MG/DL
RBC # BLD: 3.9 M/UL (ref 4.2–5.9)
RBC # BLD: NORMAL 10*6/UL
RBC UA: ABNORMAL /HPF (ref 0–4)
SODIUM BLD-SCNC: 124 MMOL/L (ref 136–145)
SODIUM URINE: <20 MMOL/L
SPECIFIC GRAVITY UA: 1.02 (ref 1–1.03)
TOTAL CK: 14 U/L (ref 39–308)
TOXIC GRANULATION: PRESENT
URINE TYPE: ABNORMAL
UROBILINOGEN, URINE: 2 E.U./DL
WBC # BLD: 15.6 K/UL (ref 4–11)
WBC UA: ABNORMAL /HPF (ref 0–5)

## 2020-09-21 PROCEDURE — 85730 THROMBOPLASTIN TIME PARTIAL: CPT

## 2020-09-21 PROCEDURE — 78580 LUNG PERFUSION IMAGING: CPT

## 2020-09-21 PROCEDURE — 76770 US EXAM ABDO BACK WALL COMP: CPT

## 2020-09-21 PROCEDURE — 6360000002 HC RX W HCPCS: Performed by: INTERNAL MEDICINE

## 2020-09-21 PROCEDURE — 2580000003 HC RX 258: Performed by: INTERNAL MEDICINE

## 2020-09-21 PROCEDURE — 82550 ASSAY OF CK (CPK): CPT

## 2020-09-21 PROCEDURE — 83735 ASSAY OF MAGNESIUM: CPT

## 2020-09-21 PROCEDURE — 80048 BASIC METABOLIC PNL TOTAL CA: CPT

## 2020-09-21 PROCEDURE — 81001 URINALYSIS AUTO W/SCOPE: CPT

## 2020-09-21 PROCEDURE — 94761 N-INVAS EAR/PLS OXIMETRY MLT: CPT

## 2020-09-21 PROCEDURE — 6370000000 HC RX 637 (ALT 250 FOR IP): Performed by: INTERNAL MEDICINE

## 2020-09-21 PROCEDURE — 93971 EXTREMITY STUDY: CPT

## 2020-09-21 PROCEDURE — 6370000000 HC RX 637 (ALT 250 FOR IP): Performed by: REGISTERED NURSE

## 2020-09-21 PROCEDURE — 3430000000 HC RX DIAGNOSTIC RADIOPHARMACEUTICAL: Performed by: REGISTERED NURSE

## 2020-09-21 PROCEDURE — A9540 TC99M MAA: HCPCS | Performed by: REGISTERED NURSE

## 2020-09-21 PROCEDURE — 6360000002 HC RX W HCPCS: Performed by: NURSE PRACTITIONER

## 2020-09-21 PROCEDURE — 85025 COMPLETE CBC W/AUTO DIFF WBC: CPT

## 2020-09-21 PROCEDURE — 2500000003 HC RX 250 WO HCPCS: Performed by: INTERNAL MEDICINE

## 2020-09-21 PROCEDURE — 84156 ASSAY OF PROTEIN URINE: CPT

## 2020-09-21 PROCEDURE — 94640 AIRWAY INHALATION TREATMENT: CPT

## 2020-09-21 PROCEDURE — 82570 ASSAY OF URINE CREATININE: CPT

## 2020-09-21 PROCEDURE — 1200000000 HC SEMI PRIVATE

## 2020-09-21 PROCEDURE — 36415 COLL VENOUS BLD VENIPUNCTURE: CPT

## 2020-09-21 PROCEDURE — 84300 ASSAY OF URINE SODIUM: CPT

## 2020-09-21 RX ORDER — SENNA AND DOCUSATE SODIUM 50; 8.6 MG/1; MG/1
2 TABLET, FILM COATED ORAL 2 TIMES DAILY
Status: DISCONTINUED | OUTPATIENT
Start: 2020-09-21 | End: 2020-09-27

## 2020-09-21 RX ORDER — HEPARIN SODIUM 1000 [USP'U]/ML
6900 INJECTION, SOLUTION INTRAVENOUS; SUBCUTANEOUS ONCE
Status: COMPLETED | OUTPATIENT
Start: 2020-09-21 | End: 2020-09-21

## 2020-09-21 RX ORDER — POLYETHYLENE GLYCOL 3350 17 G/17G
17 POWDER, FOR SOLUTION ORAL DAILY PRN
Status: DISCONTINUED | OUTPATIENT
Start: 2020-09-21 | End: 2020-09-23

## 2020-09-21 RX ORDER — LINEZOLID 600 MG/1
600 TABLET, FILM COATED ORAL EVERY 12 HOURS SCHEDULED
Status: DISCONTINUED | OUTPATIENT
Start: 2020-09-21 | End: 2020-09-25

## 2020-09-21 RX ADMIN — Medication 10 ML: at 10:07

## 2020-09-21 RX ADMIN — Medication 4.2 MILLICURIE: at 16:40

## 2020-09-21 RX ADMIN — HEPARIN SODIUM 6900 UNITS: 1000 INJECTION INTRAVENOUS; SUBCUTANEOUS at 03:23

## 2020-09-21 RX ADMIN — HEPARIN SODIUM 6900 UNITS: 1000 INJECTION INTRAVENOUS; SUBCUTANEOUS at 17:33

## 2020-09-21 RX ADMIN — OXYCODONE 10 MG: 5 TABLET ORAL at 06:16

## 2020-09-21 RX ADMIN — OXYCODONE 10 MG: 5 TABLET ORAL at 21:14

## 2020-09-21 RX ADMIN — LINEZOLID 600 MG: 600 TABLET, FILM COATED ORAL at 19:57

## 2020-09-21 RX ADMIN — CEFTRIAXONE SODIUM 1 G: 1 INJECTION, POWDER, FOR SOLUTION INTRAMUSCULAR; INTRAVENOUS at 06:14

## 2020-09-21 RX ADMIN — LINEZOLID 600 MG: 600 TABLET, FILM COATED ORAL at 12:16

## 2020-09-21 RX ADMIN — Medication 10 ML: at 19:58

## 2020-09-21 RX ADMIN — Medication 2 PUFF: at 19:33

## 2020-09-21 RX ADMIN — SODIUM BICARBONATE: 84 INJECTION, SOLUTION INTRAVENOUS at 19:36

## 2020-09-21 RX ADMIN — Medication 2 PUFF: at 08:35

## 2020-09-21 RX ADMIN — DOCUSATE SODIUM 50MG AND SENNOSIDES 8.6MG 2 TABLET: 8.6; 5 TABLET, FILM COATED ORAL at 13:49

## 2020-09-21 RX ADMIN — CEFTRIAXONE SODIUM 1 G: 1 INJECTION, POWDER, FOR SOLUTION INTRAMUSCULAR; INTRAVENOUS at 17:39

## 2020-09-21 RX ADMIN — DOCUSATE SODIUM 50MG AND SENNOSIDES 8.6MG 2 TABLET: 8.6; 5 TABLET, FILM COATED ORAL at 19:56

## 2020-09-21 RX ADMIN — ALLOPURINOL 300 MG: 300 TABLET ORAL at 10:07

## 2020-09-21 ASSESSMENT — PAIN SCALES - GENERAL
PAINLEVEL_OUTOF10: 5
PAINLEVEL_OUTOF10: 6
PAINLEVEL_OUTOF10: 7
PAINLEVEL_OUTOF10: 4
PAINLEVEL_OUTOF10: 6
PAINLEVEL_OUTOF10: 7

## 2020-09-21 ASSESSMENT — PAIN DESCRIPTION - PAIN TYPE
TYPE: ACUTE PAIN
TYPE: ACUTE PAIN

## 2020-09-21 ASSESSMENT — PAIN DESCRIPTION - LOCATION
LOCATION: KNEE
LOCATION: BUTTOCKS;HIP;LEG

## 2020-09-21 ASSESSMENT — PAIN DESCRIPTION - ORIENTATION
ORIENTATION: RIGHT
ORIENTATION: RIGHT

## 2020-09-21 NOTE — PROGRESS NOTES
Hospitalist Progress Note      PCP: Tayler Murillo MD    Date of Admission: 9/19/2020    Chief Complaint: Right leg swelling    Hospital Course: Kris Chisholm is a 63 y.o. male. He presents reporting 3 days of increasing right leg redness, swelling, and pain. He began to feel constitutionally  ill with chills and sweats on Wednesday (9/16) and noticed the leg changes developing Thursday. Appetite is strong. Denies nausea and vomiting. He has had loose stools. Patient denies any known RLE wound or trauma. He works as a . Patient has continued to take his prescribed meloxicam     Subjective:   Pt is on RA. Afebrile. VSS. Right leg remains swollen but improved. Still has some erythema. No chest pain or dyspnea. Although pt appears to get winded with long conversation. No N/V/D. Medications:  Reviewed    Infusion Medications    sodium chloride 75 mL/hr at 09/20/20 2020    heparin (Porcine) 26.1 mL/hr (09/21/20 1004)     Scheduled Medications    linezolid  600 mg Oral 2 times per day    sennosides-docusate sodium  2 tablet Oral BID    sodium chloride flush  10 mL Intravenous 2 times per day    budesonide-formoterol  2 puff Inhalation BID    cefTRIAXone (ROCEPHIN) IV  1 g Intravenous Q12H    allopurinol  300 mg Oral Daily     PRN Meds: polyethylene glycol, heparin (porcine), heparin (porcine), sodium chloride flush, sodium chloride flush, promethazine **OR** ondansetron, acetaminophen, acetaminophen, acetaminophen, melatonin ER, oxyCODONE **OR** oxyCODONE      Intake/Output Summary (Last 24 hours) at 9/21/2020 1527  Last data filed at 9/21/2020 0749  Gross per 24 hour   Intake --   Output 1750 ml   Net -1750 ml       Physical Exam Performed:    BP (!) 104/57   Pulse 86   Temp 98.6 °F (37 °C) (Oral)   Resp 18   Ht 6' (1.829 m)   Wt 220 lb (99.8 kg)   SpO2 92%   BMI 29.84 kg/m²     General appearance: No apparent distress, appears stated age and cooperative.   HEENT: Pupils equal, such as fatty infiltration         CT FEMUR RIGHT WO CONTRAST   Final Result   1. Subcutaneous fat stranding of the right lower extremity compatible with   cellulitis. No drainable fluid collection, soft tissue gas, or evidence of   fasciitis. 2. Small nonspecific right knee effusion with mild medial compartment   degenerative changes. XR TIBIA FIBULA RIGHT (2 VIEWS)   Final Result   No acute fracture or dislocation in the right tibia fibula. No destructive   osseous lesion. Soft tissue swelling. No soft tissue gas or radiopaque foreign body. XR CHEST PORTABLE   Final Result   No acute cardiopulmonary abnormality. NM LUNG SCAN PERFUSION ONLY    (Results Pending)           Assessment/Plan:    Active Hospital Problems    Diagnosis    History of pulmonary embolism [Z86.711]     Priority: High    Cellulitis of right lower extremity [L03.115]    ULISES (acute kidney injury) [facility] [N17.9]    Sepsis with acute organ dysfunction ([facility] [A41.9, R65.20]    Hyponatremia [E87.1]       Sepsis due to right lower extremity cellulitis  - Continue ceftriaxone, dc vancomycin and change to PO zyvox given ULISES. - Blood cultures are NGTD.   - Continue prn analgesia. Acute kidney injury, hyponatremia  - Baseline creatinine ~ 1.2, peaked to 3.1. Currently 2.2, slow to improve. - Hold his home lisinopril and meloxcam.  - Continue IVFs and follow BMP. - Renal US unremarkable. - Consult nephrology for further assistance.      H/O PE with significantly elevated d-dimer  - Continue Heparin gtt.  - RLE venous doppler negative for DVT. - Obtain perfusion scan to rule out PE. Constipation  - Bowel regimen ordered.        DVT Prophylaxis: Heparin  Diet: DIET CARDIAC;  Code Status: Full Code    PT/OT Eval Status: hold for now    Dispo - ~2 days     [address] [facility], APRN - CNP

## 2020-09-21 NOTE — CONSULTS
NEPHROLOGY CONSULTATION      Reason for Consult: ULISES  Requesting Physician:  Dr Winter Mater:     63 y.o. pleasant gentleman who presented to the ER Saturday for  right leg redness, swelling, and pain; he admits to some fatigue, and possibly poor appetite PTA, as well as chills and subjective feavers  Patient has continued to take his prescribed meloxicam and lisinopril   Denies previous  renal disease  Creatinine was 3.1 mg/dl on admission, now trending down with iv fluids  Renal US with no obstruction   In the ER, was hypotensive on arrival; noted leukocytosis    Past Medical History:    Diagnosis Date    Asthma 1995    Enlarged prostate      Gout 9/2014    HH (hiatus hernia) 2012     [facility] 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  Tremor, essential       Positive Family History Mother.  Wears glasses       for reading       Past Surgical History:    Procedure Laterality Date    COLONOSCOPY   06/2010     Nl    ESOPHAGEAL DILATATION        INTRACAPSULAR CATARACT EXTRACTION Left 2/26/2020     PHACOEMULSIFICATION OF CATARACT LEFT EYE WITH INTRAOCULAR LENS IMPLANT performed by Anish Short MD at [facility] Right 5/13/2020     PHACOEMULSIFICATION OF CATARACT RIGHT EYE WITH INTRAOCULAR LENS IMPLANT performed by Anish Short MD at [place] [address] ARTHROSCOPY   3/11      right, david acromioplasty , [place], mini open rotator cuff    UPPER GASTROINTESTINAL ENDOSCOPY   06/2010     Schatzki Ring s/p Dilatation              Current Medications:    Prior to Admission medications    Medication Sig Start Date End Date Taking?  Authorizing Provider   meloxicam (MOBIC) 15 MG tablet TAKE 1 TABLET BY MOUTH ONE TIME A DAY  7/6/20   Yes Joyce Crane MD   lisinopril (PRINIVIL;ZESTRIL) 20 MG tablet Take 20 mg by mouth daily     Yes Historical Provider, MD   ADVAIR DISKUS 100-50 MCG/DOSE diskus inhaler INHALE 1 DOSE BY MOUTH TWICE DAILY. RINSE MOUTH AFTER USE 12/4/15   Yes Enrique Roland MD   allopurinol (ZYLOPRIM) 300 MG tablet 1/2 pill a day. For gout prevention. Patient taking differently: 300 mg For gout prevention. 9/23/14   Yes Enrique Roland MD             Allergies:  Patient has no known allergies. Social History:     ,lives at home with his wife    Family History:   No renal disease  REVIEW OF SYSTEMS:    As per HPI, otherwise negative or noncontributory on review of 10 systems  PHYSICAL EXAM:      Vitals: Wt Readings from Last 3 Encounters:   09/19/20 220 lb (99.8 kg)   05/13/20 230 lb (104.3 kg)   02/20/20 225 lb (102.1 kg)     Temp Readings from Last 3 Encounters:   09/21/20 99.3 °F (37.4 °C) (Oral)   05/13/20 97.2 °F (36.2 °C) (Temporal)   02/26/20 98.2 °F (36.8 °C)     BP Readings from Last 3 Encounters:   09/21/20 117/68   05/13/20 (!) 160/90   05/13/20 (!) 140/90     Pulse Readings from Last 3 Encounters:   09/21/20 97   05/13/20 72   02/26/20 75   GENERAL APPEARANCE: Awake and alert. Cooperative. NAD  HEAD: Normocephalic. Atraumatic. EYES: PERRL. EOM's grossly intact. ENT: Mucous membranes are moist.   NECK: Supple. No thyromegaly. No LAD in cervical or supraclavicular areas  HEART: RRR. No murmurs. No rub  LUNGS: Respirations not labored. CTAB. ABDOMEN: Soft. + distended. Non-tender. No guarding or rebound. No midline pulsatile abdominal. No organomegaly. EXTREMITIES:  no CCE on left  SKIN: Warm and dry. + Right lower extremity edema, erythema  NEUROLOGICAL: Alert and oriented. Non-focal  PSYCHIATRIC: Normal mood and affect.   DATA:    Lab Results   Component Value Date    WBC 15.6 (H) 09/21/2020    HGB 11.5 (L) 09/21/2020    HCT 34.5 (L) 09/21/2020    MCV 88.4 09/21/2020     09/21/2020 Lab Results   Component Value Date     09/21/2020    K 3.7 09/21/2020    K 3.8 09/19/2020    CL 97 09/21/2020    CO2 17 09/21/2020    BUN 68 09/21/2020    CREATININE 2.2 09/21/2020    GLUCOSE 130 09/21/2020    CALCIUM 8.3 09/21/2020          IMPRESSION/RECOMMENDATIONS:    1. ULISES  -likely prerenal, insetting of acute illness, with hypotension, decrease intake, exposure to NSAID's and ACEI  -US with no obstruction  -creatinine peaked at 3.1 , trending down with ivf  -check UA and UPR  -continue ivf  -avoid nephrotoxic agents as possible; appreciate switching away from Vanco  2.R LE edema, cellulitis/ sepsis  -abx per primary team  -Doppler negative for DVT  -on heparin per primary team, h/o PE  3. H/O HTN- hypotensive on arrival, now better with iv fluids  -hold ACEI  4. Acidosis- ad bicarbonate to ivf  5. Hyponatremia- monitor on iv bicarbonate

## 2020-09-22 ENCOUNTER — TELEPHONE (OUTPATIENT)
Dept: ORTHOPEDIC SURGERY | Age: 65
End: 2020-09-22

## 2020-09-22 ENCOUNTER — APPOINTMENT (OUTPATIENT)
Dept: MRI IMAGING | Age: 65
DRG: 872 | End: 2020-09-22
Payer: COMMERCIAL

## 2020-09-22 LAB
ALBUMIN SERPL-MCNC: 2.1 G/DL (ref 3.4–5)
ANION GAP SERPL CALCULATED.3IONS-SCNC: 10 MMOL/L (ref 3–16)
BANDED NEUTROPHILS RELATIVE PERCENT: 7 % (ref 0–7)
BASOPHILS ABSOLUTE: 0 K/UL (ref 0–0.2)
BASOPHILS RELATIVE PERCENT: 0 %
BUN BLDV-MCNC: 43 MG/DL (ref 7–20)
CALCIUM SERPL-MCNC: 8.6 MG/DL (ref 8.3–10.6)
CHLORIDE BLD-SCNC: 99 MMOL/L (ref 99–110)
CO2: 22 MMOL/L (ref 21–32)
CREAT SERPL-MCNC: 1.5 MG/DL (ref 0.8–1.3)
DNASE B ANTIBODY: <86 U/ML (ref 0–260)
DOHLE BODIES: PRESENT
EOSINOPHILS ABSOLUTE: 0.2 K/UL (ref 0–0.6)
EOSINOPHILS RELATIVE PERCENT: 1 %
GFR AFRICAN AMERICAN: 57
GFR NON-AFRICAN AMERICAN: 47
GLUCOSE BLD-MCNC: 106 MG/DL (ref 70–99)
HCT VFR BLD CALC: 36.6 % (ref 40.5–52.5)
HEMOGLOBIN: 12.3 G/DL (ref 13.5–17.5)
LYMPHOCYTES ABSOLUTE: 0.5 K/UL (ref 1–5.1)
LYMPHOCYTES RELATIVE PERCENT: 2 %
MAGNESIUM: 2.1 MG/DL (ref 1.8–2.4)
MCH RBC QN AUTO: 29.4 PG (ref 26–34)
MCHC RBC AUTO-ENTMCNC: 33.5 G/DL (ref 31–36)
MCV RBC AUTO: 87.6 FL (ref 80–100)
MONOCYTES ABSOLUTE: 0.7 K/UL (ref 0–1.3)
MONOCYTES RELATIVE PERCENT: 3 %
NEUTROPHILS ABSOLUTE: 23.2 K/UL (ref 1.7–7.7)
NEUTROPHILS RELATIVE PERCENT: 87 %
PDW BLD-RTO: 13.9 % (ref 12.4–15.4)
PHOSPHORUS: 2.9 MG/DL (ref 2.5–4.9)
PLATELET # BLD: 206 K/UL (ref 135–450)
PLATELET SLIDE REVIEW: ADEQUATE
PMV BLD AUTO: 8.4 FL (ref 5–10.5)
POLYCHROMASIA: ABNORMAL
POTASSIUM SERPL-SCNC: 3.8 MMOL/L (ref 3.5–5.1)
RBC # BLD: 4.18 M/UL (ref 4.2–5.9)
SLIDE REVIEW: ABNORMAL
SODIUM BLD-SCNC: 131 MMOL/L (ref 136–145)
TOXIC GRANULATION: PRESENT
URIC ACID, SERUM: 4.7 MG/DL (ref 3.5–7.2)
WBC # BLD: 24.7 K/UL (ref 4–11)

## 2020-09-22 PROCEDURE — 6370000000 HC RX 637 (ALT 250 FOR IP): Performed by: REGISTERED NURSE

## 2020-09-22 PROCEDURE — A9579 GAD-BASE MR CONTRAST NOS,1ML: HCPCS | Performed by: INTERNAL MEDICINE

## 2020-09-22 PROCEDURE — 6360000002 HC RX W HCPCS: Performed by: REGISTERED NURSE

## 2020-09-22 PROCEDURE — 6360000004 HC RX CONTRAST MEDICATION: Performed by: INTERNAL MEDICINE

## 2020-09-22 PROCEDURE — 80069 RENAL FUNCTION PANEL: CPT

## 2020-09-22 PROCEDURE — 73720 MRI LWR EXTREMITY W/O&W/DYE: CPT

## 2020-09-22 PROCEDURE — 2580000003 HC RX 258: Performed by: REGISTERED NURSE

## 2020-09-22 PROCEDURE — 83735 ASSAY OF MAGNESIUM: CPT

## 2020-09-22 PROCEDURE — 36415 COLL VENOUS BLD VENIPUNCTURE: CPT

## 2020-09-22 PROCEDURE — 87040 BLOOD CULTURE FOR BACTERIA: CPT

## 2020-09-22 PROCEDURE — 2500000003 HC RX 250 WO HCPCS: Performed by: INTERNAL MEDICINE

## 2020-09-22 PROCEDURE — 2580000003 HC RX 258: Performed by: INTERNAL MEDICINE

## 2020-09-22 PROCEDURE — 99253 IP/OBS CNSLTJ NEW/EST LOW 45: CPT | Performed by: INTERNAL MEDICINE

## 2020-09-22 PROCEDURE — 85025 COMPLETE CBC W/AUTO DIFF WBC: CPT

## 2020-09-22 PROCEDURE — 6370000000 HC RX 637 (ALT 250 FOR IP): Performed by: INTERNAL MEDICINE

## 2020-09-22 PROCEDURE — 94640 AIRWAY INHALATION TREATMENT: CPT

## 2020-09-22 PROCEDURE — 84550 ASSAY OF BLOOD/URIC ACID: CPT

## 2020-09-22 PROCEDURE — 1200000000 HC SEMI PRIVATE

## 2020-09-22 PROCEDURE — 6360000002 HC RX W HCPCS: Performed by: INTERNAL MEDICINE

## 2020-09-22 RX ORDER — LACTOBACILLUS RHAMNOSUS GG 10B CELL
2 CAPSULE ORAL 2 TIMES DAILY WITH MEALS
Status: DISCONTINUED | OUTPATIENT
Start: 2020-09-22 | End: 2020-09-30 | Stop reason: HOSPADM

## 2020-09-22 RX ORDER — HEPARIN SODIUM 5000 [USP'U]/ML
5000 INJECTION, SOLUTION INTRAVENOUS; SUBCUTANEOUS EVERY 8 HOURS SCHEDULED
Status: DISCONTINUED | OUTPATIENT
Start: 2020-09-22 | End: 2020-09-30 | Stop reason: HOSPADM

## 2020-09-22 RX ADMIN — Medication 2 PUFF: at 08:20

## 2020-09-22 RX ADMIN — DOCUSATE SODIUM 50MG AND SENNOSIDES 8.6MG 2 TABLET: 8.6; 5 TABLET, FILM COATED ORAL at 08:48

## 2020-09-22 RX ADMIN — Medication 2 PUFF: at 20:20

## 2020-09-22 RX ADMIN — DOCUSATE SODIUM 50MG AND SENNOSIDES 8.6MG 2 TABLET: 8.6; 5 TABLET, FILM COATED ORAL at 20:49

## 2020-09-22 RX ADMIN — GADOTERIDOL 20 ML: 279.3 INJECTION, SOLUTION INTRAVENOUS at 17:08

## 2020-09-22 RX ADMIN — HEPARIN SODIUM 5000 UNITS: 5000 INJECTION INTRAVENOUS; SUBCUTANEOUS at 20:49

## 2020-09-22 RX ADMIN — OXYCODONE 10 MG: 5 TABLET ORAL at 23:59

## 2020-09-22 RX ADMIN — Medication 900 MG: at 22:08

## 2020-09-22 RX ADMIN — LINEZOLID 600 MG: 600 TABLET, FILM COATED ORAL at 08:48

## 2020-09-22 RX ADMIN — LINEZOLID 600 MG: 600 TABLET, FILM COATED ORAL at 20:49

## 2020-09-22 RX ADMIN — Medication 2 CAPSULE: at 17:48

## 2020-09-22 RX ADMIN — ALLOPURINOL 300 MG: 300 TABLET ORAL at 08:48

## 2020-09-22 RX ADMIN — PIPERACILLIN AND TAZOBACTAM 3.38 G: 3; .375 INJECTION, POWDER, FOR SOLUTION INTRAVENOUS at 09:56

## 2020-09-22 RX ADMIN — SODIUM BICARBONATE: 84 INJECTION, SOLUTION INTRAVENOUS at 05:28

## 2020-09-22 RX ADMIN — OXYCODONE 10 MG: 5 TABLET ORAL at 05:35

## 2020-09-22 RX ADMIN — CEFTRIAXONE SODIUM 1 G: 1 INJECTION, POWDER, FOR SOLUTION INTRAMUSCULAR; INTRAVENOUS at 05:28

## 2020-09-22 RX ADMIN — SODIUM BICARBONATE: 84 INJECTION, SOLUTION INTRAVENOUS at 22:17

## 2020-09-22 RX ADMIN — HEPARIN SODIUM 5000 UNITS: 5000 INJECTION INTRAVENOUS; SUBCUTANEOUS at 13:40

## 2020-09-22 RX ADMIN — Medication 900 MG: at 15:08

## 2020-09-22 RX ADMIN — Medication 10 ML: at 20:49

## 2020-09-22 RX ADMIN — PIPERACILLIN AND TAZOBACTAM 3.38 G: 3; .375 INJECTION, POWDER, FOR SOLUTION INTRAVENOUS at 17:49

## 2020-09-22 RX ADMIN — OXYCODONE 10 MG: 5 TABLET ORAL at 11:33

## 2020-09-22 RX ADMIN — OXYCODONE 10 MG: 5 TABLET ORAL at 17:48

## 2020-09-22 ASSESSMENT — PAIN DESCRIPTION - ORIENTATION
ORIENTATION: RIGHT

## 2020-09-22 ASSESSMENT — PAIN SCALES - GENERAL
PAINLEVEL_OUTOF10: 7
PAINLEVEL_OUTOF10: 8
PAINLEVEL_OUTOF10: 7
PAINLEVEL_OUTOF10: 6
PAINLEVEL_OUTOF10: 7
PAINLEVEL_OUTOF10: 6

## 2020-09-22 ASSESSMENT — PAIN DESCRIPTION - LOCATION
LOCATION: BUTTOCKS;HIP;LEG
LOCATION: LEG

## 2020-09-22 ASSESSMENT — PAIN DESCRIPTION - PAIN TYPE
TYPE: ACUTE PAIN

## 2020-09-22 NOTE — PROGRESS NOTES
Hospitalist Progress Note      PCP: Michelle Esquivel MD    Date of Admission: 9/19/2020    Chief Complaint: Right leg swelling    Hospital Course: Lotus Foster is a 63 y.o. male. He presents reporting 3 days of increasing right leg redness, swelling, and pain. He began to feel constitutionally  ill with chills and sweats on Wednesday (9/16) and noticed the leg changes developing Thursday. Appetite is strong. Denies nausea and vomiting. He has had loose stools. Patient denies any known RLE wound or trauma. He works as a . Patient has continued to take his prescribed meloxicam     Subjective:   Pt is on RA. tmax 100.5. VSS. Right leg appears worse today, worsening erythema and now with blister formation. Medications:  Reviewed    Infusion Medications    sodium bicarbonate infusion 100 mL/hr at 09/22/20 0528     Scheduled Medications    piperacillin-tazobactam  3.375 g Intravenous Q8H    heparin (porcine)  5,000 Units Subcutaneous 3 times per day    clindamycin (CLEOCIN) IV  900 mg Intravenous Q8H    lactobacillus  2 capsule Oral BID WC    linezolid  600 mg Oral 2 times per day    sennosides-docusate sodium  2 tablet Oral BID    sodium chloride flush  10 mL Intravenous 2 times per day    budesonide-formoterol  2 puff Inhalation BID    allopurinol  300 mg Oral Daily     PRN Meds: polyethylene glycol, sodium chloride flush, promethazine **OR** ondansetron, acetaminophen, melatonin ER, oxyCODONE **OR** oxyCODONE      Intake/Output Summary (Last 24 hours) at 9/22/2020 1526  Last data filed at 9/22/2020 0532  Gross per 24 hour   Intake --   Output 1275 ml   Net -1275 ml       Physical Exam Performed:    /67   Pulse 78   Temp 99.2 °F (37.3 °C) (Oral)   Resp 16   Ht 6' (1.829 m)   Wt 220 lb (99.8 kg)   SpO2 94%   BMI 29.84 kg/m²     General appearance: No apparent distress, appears stated age and cooperative. HEENT: Pupils equal, round, and reactive to light. Conjunctivae/corneas clear. Neck: Supple, with full range of motion. No jugular venous distention. Trachea midline. Respiratory:  Normal respiratory effort. Clear to auscultation, bilaterally without Rales/Wheezes/Rhonchi. Cardiovascular: Regular rate and rhythm with normal S1/S2 without murmurs, rubs or gallops. Abdomen: Soft, non-tender, non-distended with normal bowel sounds. Musculoskeletal: No clubbing, cyanosis. Full range of motion without deformity. Skin: Right lower extremity very swollen, tender to light palpation. Neurologic:  Neurovascularly intact without any focal sensory/motor deficits. Cranial nerves: II-XII intact, grossly non-focal.  Psychiatric: Alert and oriented, thought content appropriate, normal insight  Capillary Refill: Brisk,< 3 seconds   Peripheral Pulses: +2 palpable, equal bilaterally       Labs:   Recent Labs     09/20/20  0709 09/21/20  0208 09/22/20  0853   WBC 13.4* 15.6* 24.7*   HGB 12.8* 11.5* 12.3*   HCT 37.8* 34.5* 36.6*    151 206     Recent Labs     09/20/20  0709 09/21/20  0208 09/22/20  0853   * 124* 131*   K 3.6 3.7 3.8   CL 96* 97* 99   CO2 20* 17* 22   BUN 69* 68* 43*   CREATININE 2.7* 2.2* 1.5*   CALCIUM 8.5 8.3 8.6   PHOS  --   --  2.9     Recent Labs     09/19/20  1711   AST 11*   ALT 15   BILITOT 2.4*   ALKPHOS 93     Recent Labs     09/19/20  1711   INR 1.39*     Recent Labs     09/19/20  1711 09/21/20  0208   CKTOTAL  --  14*   TROPONINI <0.01  --        Urinalysis:      Lab Results   Component Value Date    NITRU Negative 09/21/2020    WBCUA 0-2 09/21/2020    BACTERIA 4+ 09/21/2020    RBCUA None seen 09/21/2020    BLOODU TRACE-INTACT 09/21/2020    SPECGRAV 1.020 09/21/2020    GLUCOSEU Negative 09/21/2020    GLUCOSEU NEGATIVE 03/29/2011       Radiology:  NM LUNG SCAN PERFUSION ONLY   Final Result   Low probability for pulmonary embolus.          VL Extremity Venous Right   Final Result      US RENAL COMPLETE   Final Result   No hydronephrosis noted      Increased echogenicity throughout the liver, suggesting diffuse   hepatocellular disease such as fatty infiltration         CT FEMUR RIGHT WO CONTRAST   Final Result   1. Subcutaneous fat stranding of the right lower extremity compatible with   cellulitis. No drainable fluid collection, soft tissue gas, or evidence of   fasciitis. 2. Small nonspecific right knee effusion with mild medial compartment   degenerative changes. XR TIBIA FIBULA RIGHT (2 VIEWS)   Final Result   No acute fracture or dislocation in the right tibia fibula. No destructive   osseous lesion. Soft tissue swelling. No soft tissue gas or radiopaque foreign body. XR CHEST PORTABLE   Final Result   No acute cardiopulmonary abnormality. MRI TIBIA FIBULA LEFT W WO CONTRAST    (Results Pending)           Assessment/Plan:    Active Hospital Problems    Diagnosis    History of pulmonary embolism [Z86.711]     Priority: High    Cellulitis of right lower extremity [L03.115]    ULISES (acute kidney injury) ([facility] [address] [N17.9]    Sepsis with acute organ dysfunction ([address] [A41.9, R65.20]    Hyponatremia [E87.1]       Sepsis due to right lower extremity cellulitis (worsening)  - Pt started on rocephin and vancomycin on admission. Dc'd vanc and changed to zyvox due to ULISES on 9/21. Dc rocephin and change to zosyn on 9/22 given fever/leukocytosis. Consult ID for further assistance -- appreciate. Added clindamycin. Plan for MRI. - Blood cultures are NGTD. Repeat blood cultures. - Continue prn analgesia. Acute kidney injury, hyponatremia, metabolic acidosis   - Baseline creatinine ~ 1.2, peaked to 3.1. Currently 1.5.  - Continue to hold his home lisinopril and meloxcam.  - Continue IVFs and follow BMP. - Renal US unremarkable. - Consult nephrology for further assistance.      H/O PE with significantly elevated d-dimer  - RLE venous doppler negative for DVT. - Perfusion scan negative for PE.   - Heparin gtt dc'd. Constipation  - Bowel regimen ordered.        DVT Prophylaxis: Heparin SQ  Diet: DIET CARDIAC;  Code Status: Full Code    PT/OT Eval Status: hold for now    Dispo - uncertain     [address], APRN - CNP

## 2020-09-22 NOTE — TELEPHONE ENCOUNTER
Patient scheduled for Right Knee scope 09/29/2020 09/19/2020 admitted for Cellulitis Right Knee     Case cancelled     Jm

## 2020-09-22 NOTE — PLAN OF CARE
Problem: Pain:  Goal: Control of acute pain  Description: Control of acute pain  Outcome: Ongoing  Note: Pt rates pain using 0-10 scale. Instructed pt to call with increasing pain or ineffective pain relief after taking pain medication. Pt verbalized understanding. Call light within reach, will continue to monitor.

## 2020-09-22 NOTE — PROGRESS NOTES
NEPHROLOGY PROGRESS NOTE    CC:ULISES  HPI  63 y. o. pleasant gentleman who presented to the ER Saturday for  right leg redness, swelling, and pain; he admits to some fatigue, and possibly poor appetite PTA, as well as chills and subjective fevers  Patient has continued to take his prescribed meloxicam and lisinopril   Denies previous  renal disease  Creatinine was 3.1 mg/dl on admission, now trending down with iv fluids  Renal US with no obstruction      SUBJECTIVE    Feels better today  R LE cellulitis not better  No CP or SOB, no edema on L    SOC: friend at bedside          Scheduled Meds:   piperacillin-tazobactam  3.375 g Intravenous Q8H    heparin (porcine)  5,000 Units Subcutaneous 3 times per day    linezolid  600 mg Oral 2 times per day    sennosides-docusate sodium  2 tablet Oral BID    sodium chloride flush  10 mL Intravenous 2 times per day    budesonide-formoterol  2 puff Inhalation BID    allopurinol  300 mg Oral Daily     Continuous Infusions:   sodium bicarbonate infusion 100 mL/hr at 09/22/20 0528     PRN Meds:polyethylene glycol, sodium chloride flush, promethazine **OR** ondansetron, acetaminophen, melatonin ER, oxyCODONE **OR** oxyCODONE      Objective:      Physical Exam  Wt Readings from Last 3 Encounters:   09/19/20 220 lb (99.8 kg)   05/13/20 230 lb (104.3 kg)   02/20/20 225 lb (102.1 kg)     Temp Readings from Last 3 Encounters:   09/22/20 99.1 °F (37.3 °C) (Oral)   05/13/20 97.2 °F (36.2 °C) (Temporal)   02/26/20 98.2 °F (36.8 °C)     BP Readings from Last 3 Encounters:   09/22/20 130/73   05/13/20 (!) 160/90   05/13/20 (!) 140/90     Pulse Readings from Last 3 Encounters:   09/22/20 85   05/13/20 72   02/26/20 75     GENERAL APPEARANCE:  NAD  NECK: Supple. No thyromegaly. No LAD in cervical or supraclavicular areas  HEART: RRR. No murmurs. No rub  LUNGS: Respirations not labored. CTAB.   ABDOMEN: Soft. + distended. Non-tender.  No guarding or rebound.  No midline pulsatile abdominal. No organomegaly. EXTREMITIES:  no CCE on left  SKIN: Warm and dry. + Right lower extremity edema, erythema  NEUROLOGICAL: Alert and oriented. Non-focal  PSYCHIATRIC: Normal mood and affect. Lab Review   Lab Results   Component Value Date    WBC 24.7 (H) 09/22/2020    HGB 12.3 (L) 09/22/2020    HCT 36.6 (L) 09/22/2020    MCV 87.6 09/22/2020     09/22/2020     Lab Results   Component Value Date     09/22/2020    K 3.8 09/22/2020    K 3.8 09/19/2020    CL 99 09/22/2020    CO2 22 09/22/2020    BUN 43 09/22/2020    CREATININE 1.5 09/22/2020    GLUCOSE 106 09/22/2020    CALCIUM 8.6 09/22/2020          Patient Active Problem List    Diagnosis Date Noted    History of pulmonary embolism      Priority: High    Hyperlipemia 03/31/2011     Priority: High    Pulmonary embolism ([facility] 03/30/2011     Priority: High    Gout 09/01/2014     Priority: Medium    Asthma      Priority: Medium    Tremor, essential      Priority: Medium    Schatzki's ring      Priority: Medium    Toenail fungus      Priority: Low    LVH (left ventricular hypertrophy)      Priority: Low    HH (hiatus hernia)      Priority: Low    Rosacea      Priority: Low    Pneumonia 03/31/2011     Priority: Low    Dyspnea 03/31/2011     Priority: Low    Constipation 03/31/2011     Priority: Low    S/P shoulder surgery 03/31/2011     Priority: Low    Cellulitis of right lower extremity 09/19/2020    ULISES (acute kidney injury) ([facility] [address] 09/19/2020    Sepsis with acute organ dysfunction ([facility] [address] 09/19/2020    Hyponatremia 09/19/2020    Pain in limb 07/20/2015    Finger pain 07/13/2015       ASSESSMENT AND PLAN     1. ULISES  -likely prerenal, insetting of acute illness, with hypotension, decrease intake, exposure to NSAID's and ACEI  -US with no obstruction  -creatinine peaked at 3.1 , trending down with ivf  -continue ivf  -avoid nephrotoxic agents as possible; appreciate switching away from Vanco  2.R LE edema, cellulitis/ sepsis  -abx per

## 2020-09-22 NOTE — CARE COORDINATION
CASE MANAGEMENT INITIAL ASSESSMENT      Reviewed chart and completed assessment with: patient  Explained Case Management role/services. Primary contact information:    Health Care Decision Maker:   Who do you trust or have selected to make healthcare decisions for you? Name:   Manjeet Landers, spouse  Phone  Number: [phone]  Can this person be reached and be able to respond quickly, such as within a few minutes or hours? Yes  Who would be your back-up decision maker? Name Cristina Molina, step daughter  Phone Dionte Arredondo date/status: 9/19/2020  Diagnosis: Cellulitis of right lower extremity  Is this a Readmission?:  no  Insurance:   Precert required for SNF - Y       3 night stay required - N    Living arrangements, Adls, care needs, prior to admission: Pt lives w/spouse in a 2 story home with 12 step entry and per pt, 30 steps to bedroom;  [place], driving and working    Transportation: private    [facility] [place] at home: none    Services in the home and/or outpatient, prior to admission: none      PT/OT recs: not initiated at this time    [address] Notification (HEN): not initiated at this time    Barriers to discharge: none    Plan/comments: Pt plans to d/c home with spouse;  Pt has PCP and [facility]. DCP will continue to follow for possible IV ABX needs.   Shara Dugan RN      [facility] on chart for MD signature

## 2020-09-22 NOTE — CONSULTS
Infectious Diseases   Consult Note      Reason for Consult:  Cellulitis, leukocytosis    Requesting Physician:  RERE Scruggs       Date of Admission: 9/19/2020  Subjective:   CHIEF COMPLAINT:  None given       HPI:    Tiffanie Jones is a 63yoM with history of obesity                ED 9/19/20 - RLE swelling x4 days  Admitted for management of cellulitis and sepsis started on vanc and rocephin, then changed to linezolid due to ULISES      WBC is up further today at 24.7 with 7% bands. ULISES present on admission is improved. ASO/streptozyme both negative   BC x2 sets collected on admission also negative         Current abx:  linezolid 600 po BID  Zosyn 3.375 q8       Past Surgical History:       Diagnosis Date    Asthma 1995    Enlarged prostate     Gout 9/2014    HH (hiatus hernia) 2012    [facility] [place]    Hypertension     LVH (left ventricular hypertrophy) 03/2011    Echo concentric LVH    Pulmonary embolus ([address] 3/2011    After Shoulder Surgery    Rosacea     Schatzki's ring 06/2010    EGD s/p Dilatation    Toenail fungus     Foot and Toenail Fungus.  Tremor, essential     Positive Family History Mother.  Wears glasses     for reading         Procedure Laterality Date    COLONOSCOPY  06/2010    Nl    ESOPHAGEAL DILATATION      INTRACAPSULAR CATARACT EXTRACTION Left 2/26/2020    PHACOEMULSIFICATION OF CATARACT LEFT EYE WITH INTRAOCULAR LENS IMPLANT performed by Chandrakant James MD at [facility] Right 5/13/2020    PHACOEMULSIFICATION OF CATARACT RIGHT EYE WITH INTRAOCULAR LENS IMPLANT performed by Chandrakant James MD at [place] [address] ARTHROSCOPY  3/11     right, [facility] acromioplasty , [place], mini open rotator cuff    UPPER GASTROINTESTINAL ENDOSCOPY  06/2010    Schatzki Ring s/p Dilatation       Social History:    TOBACCO:   reports that he has never smoked. He has never used smokeless tobacco.  ETOH:   reports previous alcohol use.   There is no history of illicit drug use or other significant epidemiologic exposures.       Family History:       Problem Relation Age of Onset    Stroke Father     Asthma Father     Dementia Father     Pacemaker Mother     Cancer Sister         Breast CA    Cancer Sister         Cervical CA       Current Medications:    Current Facility-Administered Medications: piperacillin-tazobactam (ZOSYN) 3.375 g in dextrose 5 % 50 mL IVPB extended infusion (mini-bag), 3.375 g, Intravenous, Q8H  heparin (porcine) injection 5,000 Units, 5,000 Units, Subcutaneous, 3 times per day  linezolid (ZYVOX) tablet 600 mg, 600 mg, Oral, 2 times per day  sennosides-docusate sodium (SENOKOT-S) 8.6-50 MG tablet 2 tablet, 2 tablet, Oral, BID  polyethylene glycol (GLYCOLAX) packet 17 g, 17 g, Oral, Daily PRN  sodium bicarbonate 100 mEq in sodium chloride 0.45 % 1,000 mL infusion, , Intravenous, Continuous  sodium chloride flush 0.9 % injection 10 mL, 10 mL, Intravenous, 2 times per day  sodium chloride flush 0.9 % injection 10 mL, 10 mL, Intravenous, PRN  budesonide-formoterol (SYMBICORT) 80-4.5 MCG/ACT inhaler 2 puff, 2 puff, Inhalation, BID  promethazine (PHENERGAN) tablet 12.5 mg, 12.5 mg, Oral, Q6H PRN **OR** ondansetron (ZOFRAN) injection 4 mg, 4 mg, Intravenous, Q6H PRN  acetaminophen (TYLENOL) tablet 650 mg, 650 mg, Oral, Q4H PRN  melatonin ER tablet 2 mg, 2 mg, Oral, Nightly PRN  oxyCODONE (ROXICODONE) immediate release tablet 10 mg, 10 mg, Oral, Q6H PRN **OR** oxyCODONE (ROXICODONE) immediate release tablet 5 mg, 5 mg, Oral, Q6H PRN  allopurinol (ZYLOPRIM) tablet 300 mg, 300 mg, Oral, Daily      No Known Allergies       REVIEW OF SYSTEMS:    CONSTITUTIONAL:   +fever prior to admission   EYES:  negative for blurred vision, eye discharge, visual disturbance and icterus  HEENT:  negative for hearing loss, tinnitus, ear drainage, sinus pressure, nasal congestion, epistaxis and snoring  RESPIRATORY:  No cough, shortness of breath, hemoptysis  CARDIOVASCULAR:  negative for chest pain, palpitations, exertional chest pressure/discomfort, syncope  GASTROINTESTINAL:  negative for nausea, vomiting, diarrhea, constipation, blood in stool and abdominal pain  GENITOURINARY:  negative for frequency, dysuria, urinary incontinence, decreased urine volume, and hematuria  HEMATOLOGIC/LYMPHATIC:  negative for easy bruising, bleeding and lymphadenopathy  ALLERGIC/IMMUNOLOGIC:  negative for recurrent infections, angioedema, anaphylaxis and drug reactions  ENDOCRINE:  negative for weight changes and diabetic symptoms including polyuria, polydipsia and polyphagia  MUSCULOSKELETAL:  negative for acute joint pain, joint swelling, decreased range of motion and muscle weakness  NEUROLOGICAL:  negative for headaches, slurred speech, unilateral weakness  PSYCHIATRIC/BEHAVIORAL: negative for hallucinations, behavioral problems, confusion and agitation. Objective:   PHYSICAL EXAM:      VITALS:  /73   Pulse 85   Temp 99.1 °F (37.3 °C) (Oral)   Resp 16   Ht 6' (1.829 m)   Wt 220 lb (99.8 kg)   SpO2 96%   BMI 29.84 kg/m²      24HR INTAKE/OUTPUT:      Intake/Output Summary (Last 24 hours) at 9/22/2020 1400  Last data filed at 9/22/2020 0532  Gross per 24 hour   Intake --   Output 1375 ml   Net -1375 ml     CONSTITUTIONAL:  Awake, alert, cooperative, no apparent distress, and appears stated age  [de-identified]: NCAT, PERRL, EOMI. Sclera white, conjunctiva full. OP with moist mucosal membranes, no thrush, tongue protrudes midline  NECK:  Supple, symmetrical, trachea midline, no adenopathy  LUNGS:  no increased work of breathing, CTA seferino   CARDIOVASCULAR:  RRR  ABDOMEN:  normal bowel sounds, soft, flat, NT   PSYCHIATRIC: Oriented to person place and time. No obvious depression or anxiety. MUSCULOSKELETAL:  Erythema R foot to mid abdomen with large bullaeR calf and thigh   No crepitus   SKIN:  normal skin color, texture, turgor and no redness, warmth, or swelling. bullae  Extension of erythema to mid abdomen and rising WBC   CK was normal  Microbiologic etiology not defined though streptococcal etiology presumed even though ASO/streptozyme were negative     -Continue linezolid and Zosyn  -Add clinda  -MRI lower R leg to better exclude necrotizing infection   -elevate the leg     We will follow        Morales Ibarra M.D. Thank you for the opportunity to participate in the care of your patient.     Please do not hesitate to contact me:   [phone] office  [phone] mobile

## 2020-09-23 ENCOUNTER — APPOINTMENT (OUTPATIENT)
Dept: INTERVENTIONAL RADIOLOGY/VASCULAR | Age: 65
DRG: 872 | End: 2020-09-23
Payer: COMMERCIAL

## 2020-09-23 LAB
A/G RATIO: 0.6 (ref 1.1–2.2)
ALBUMIN SERPL-MCNC: 1.8 G/DL (ref 3.4–5)
ALP BLD-CCNC: 139 U/L (ref 40–129)
ALT SERPL-CCNC: 32 U/L (ref 10–40)
ANION GAP SERPL CALCULATED.3IONS-SCNC: 10 MMOL/L (ref 3–16)
AST SERPL-CCNC: 43 U/L (ref 15–37)
BANDED NEUTROPHILS RELATIVE PERCENT: 16 % (ref 0–7)
BASOPHILS ABSOLUTE: 0 K/UL (ref 0–0.2)
BASOPHILS RELATIVE PERCENT: 0 %
BILIRUB SERPL-MCNC: 1.4 MG/DL (ref 0–1)
BLOOD CULTURE, ROUTINE: NORMAL
BUN BLDV-MCNC: 33 MG/DL (ref 7–20)
CALCIUM SERPL-MCNC: 8.4 MG/DL (ref 8.3–10.6)
CHLORIDE BLD-SCNC: 101 MMOL/L (ref 99–110)
CO2: 22 MMOL/L (ref 21–32)
CREAT SERPL-MCNC: 1.2 MG/DL (ref 0.8–1.3)
CULTURE, BLOOD 2: NORMAL
DOHLE BODIES: PRESENT
EOSINOPHILS ABSOLUTE: 0.2 K/UL (ref 0–0.6)
EOSINOPHILS RELATIVE PERCENT: 1 %
GFR AFRICAN AMERICAN: >60
GFR NON-AFRICAN AMERICAN: >60
GLOBULIN: 3.2 G/DL
GLUCOSE BLD-MCNC: 108 MG/DL (ref 70–99)
HCT VFR BLD CALC: 35.8 % (ref 40.5–52.5)
HEMOGLOBIN: 12 G/DL (ref 13.5–17.5)
LYMPHOCYTES ABSOLUTE: 0.7 K/UL (ref 1–5.1)
LYMPHOCYTES RELATIVE PERCENT: 3 %
MAGNESIUM: 2.1 MG/DL (ref 1.8–2.4)
MCH RBC QN AUTO: 29.5 PG (ref 26–34)
MCHC RBC AUTO-ENTMCNC: 33.6 G/DL (ref 31–36)
MCV RBC AUTO: 87.8 FL (ref 80–100)
METAMYELOCYTES RELATIVE PERCENT: 1 %
MONOCYTES ABSOLUTE: 0.9 K/UL (ref 0–1.3)
MONOCYTES RELATIVE PERCENT: 4 %
MYELOCYTE PERCENT: 1 %
NEUTROPHILS ABSOLUTE: 20.1 K/UL (ref 1.7–7.7)
NEUTROPHILS RELATIVE PERCENT: 74 %
PDW BLD-RTO: 13.8 % (ref 12.4–15.4)
PHOSPHORUS: 3.4 MG/DL (ref 2.5–4.9)
PLATELET # BLD: 224 K/UL (ref 135–450)
PLATELET SLIDE REVIEW: ADEQUATE
PMV BLD AUTO: 8.4 FL (ref 5–10.5)
POLYCHROMASIA: ABNORMAL
POTASSIUM SERPL-SCNC: 3.6 MMOL/L (ref 3.5–5.1)
RBC # BLD: 4.08 M/UL (ref 4.2–5.9)
RBC # BLD: NORMAL 10*6/UL
SLIDE REVIEW: ABNORMAL
SODIUM BLD-SCNC: 133 MMOL/L (ref 136–145)
TOTAL CK: 15 U/L (ref 39–308)
TOTAL PROTEIN: 5 G/DL (ref 6.4–8.2)
TOXIC GRANULATION: PRESENT
WBC # BLD: 21.9 K/UL (ref 4–11)

## 2020-09-23 PROCEDURE — 84100 ASSAY OF PHOSPHORUS: CPT

## 2020-09-23 PROCEDURE — 82550 ASSAY OF CK (CPK): CPT

## 2020-09-23 PROCEDURE — 02HV33Z INSERTION OF INFUSION DEVICE INTO SUPERIOR VENA CAVA, PERCUTANEOUS APPROACH: ICD-10-PCS | Performed by: INTERNAL MEDICINE

## 2020-09-23 PROCEDURE — 2500000003 HC RX 250 WO HCPCS: Performed by: INTERNAL MEDICINE

## 2020-09-23 PROCEDURE — 94640 AIRWAY INHALATION TREATMENT: CPT

## 2020-09-23 PROCEDURE — 85025 COMPLETE CBC W/AUTO DIFF WBC: CPT

## 2020-09-23 PROCEDURE — 6370000000 HC RX 637 (ALT 250 FOR IP): Performed by: INTERNAL MEDICINE

## 2020-09-23 PROCEDURE — 80053 COMPREHEN METABOLIC PANEL: CPT

## 2020-09-23 PROCEDURE — 6370000000 HC RX 637 (ALT 250 FOR IP): Performed by: REGISTERED NURSE

## 2020-09-23 PROCEDURE — 36573 INSJ PICC RS&I 5 YR+: CPT

## 2020-09-23 PROCEDURE — 99232 SBSQ HOSP IP/OBS MODERATE 35: CPT | Performed by: INTERNAL MEDICINE

## 2020-09-23 PROCEDURE — C1751 CATH, INF, PER/CENT/MIDLINE: HCPCS

## 2020-09-23 PROCEDURE — 2580000003 HC RX 258: Performed by: INTERNAL MEDICINE

## 2020-09-23 PROCEDURE — 2580000003 HC RX 258: Performed by: REGISTERED NURSE

## 2020-09-23 PROCEDURE — 83735 ASSAY OF MAGNESIUM: CPT

## 2020-09-23 PROCEDURE — 1200000000 HC SEMI PRIVATE

## 2020-09-23 PROCEDURE — 6360000002 HC RX W HCPCS: Performed by: REGISTERED NURSE

## 2020-09-23 RX ORDER — SODIUM CHLORIDE 0.9 % (FLUSH) 0.9 %
10 SYRINGE (ML) INJECTION EVERY 12 HOURS SCHEDULED
Status: DISCONTINUED | OUTPATIENT
Start: 2020-09-23 | End: 2020-09-30 | Stop reason: HOSPADM

## 2020-09-23 RX ORDER — LIDOCAINE HYDROCHLORIDE 10 MG/ML
5 INJECTION, SOLUTION INFILTRATION; PERINEURAL ONCE
Status: COMPLETED | OUTPATIENT
Start: 2020-09-23 | End: 2020-09-23

## 2020-09-23 RX ORDER — POLYETHYLENE GLYCOL 3350 17 G/17G
17 POWDER, FOR SOLUTION ORAL DAILY
Status: DISCONTINUED | OUTPATIENT
Start: 2020-09-23 | End: 2020-09-27

## 2020-09-23 RX ORDER — LACTULOSE 10 G/15ML
10 SOLUTION ORAL 3 TIMES DAILY
Status: DISCONTINUED | OUTPATIENT
Start: 2020-09-23 | End: 2020-09-26

## 2020-09-23 RX ORDER — SODIUM CHLORIDE 0.9 % (FLUSH) 0.9 %
10 SYRINGE (ML) INJECTION PRN
Status: DISCONTINUED | OUTPATIENT
Start: 2020-09-23 | End: 2020-09-30 | Stop reason: HOSPADM

## 2020-09-23 RX ADMIN — PIPERACILLIN AND TAZOBACTAM 3.38 G: 3; .375 INJECTION, POWDER, FOR SOLUTION INTRAVENOUS at 10:51

## 2020-09-23 RX ADMIN — HEPARIN SODIUM 5000 UNITS: 5000 INJECTION INTRAVENOUS; SUBCUTANEOUS at 21:11

## 2020-09-23 RX ADMIN — OXYCODONE 10 MG: 5 TABLET ORAL at 18:09

## 2020-09-23 RX ADMIN — Medication 900 MG: at 06:10

## 2020-09-23 RX ADMIN — PIPERACILLIN AND TAZOBACTAM 3.38 G: 3; .375 INJECTION, POWDER, FOR SOLUTION INTRAVENOUS at 01:28

## 2020-09-23 RX ADMIN — ALLOPURINOL 300 MG: 300 TABLET ORAL at 10:35

## 2020-09-23 RX ADMIN — Medication 900 MG: at 16:54

## 2020-09-23 RX ADMIN — HEPARIN SODIUM 5000 UNITS: 5000 INJECTION INTRAVENOUS; SUBCUTANEOUS at 15:03

## 2020-09-23 RX ADMIN — Medication 900 MG: at 21:16

## 2020-09-23 RX ADMIN — SODIUM BICARBONATE: 84 INJECTION, SOLUTION INTRAVENOUS at 11:49

## 2020-09-23 RX ADMIN — Medication 2 PUFF: at 20:22

## 2020-09-23 RX ADMIN — Medication 2 CAPSULE: at 10:36

## 2020-09-23 RX ADMIN — Medication 2 CAPSULE: at 18:01

## 2020-09-23 RX ADMIN — LACTULOSE 10 G: 20 SOLUTION ORAL at 15:01

## 2020-09-23 RX ADMIN — LINEZOLID 600 MG: 600 TABLET, FILM COATED ORAL at 21:11

## 2020-09-23 RX ADMIN — PIPERACILLIN AND TAZOBACTAM 3.38 G: 3; .375 INJECTION, POWDER, FOR SOLUTION INTRAVENOUS at 18:01

## 2020-09-23 RX ADMIN — LIDOCAINE HYDROCHLORIDE 5 ML: 10 INJECTION, SOLUTION EPIDURAL; INFILTRATION; INTRACAUDAL; PERINEURAL at 16:20

## 2020-09-23 RX ADMIN — POLYETHYLENE GLYCOL 3350 17 G: 17 POWDER, FOR SOLUTION ORAL at 15:01

## 2020-09-23 RX ADMIN — OXYCODONE 10 MG: 5 TABLET ORAL at 06:11

## 2020-09-23 RX ADMIN — Medication 2 PUFF: at 08:09

## 2020-09-23 RX ADMIN — LINEZOLID 600 MG: 600 TABLET, FILM COATED ORAL at 10:35

## 2020-09-23 RX ADMIN — HEPARIN SODIUM 5000 UNITS: 5000 INJECTION INTRAVENOUS; SUBCUTANEOUS at 06:10

## 2020-09-23 RX ADMIN — DOCUSATE SODIUM 50MG AND SENNOSIDES 8.6MG 2 TABLET: 8.6; 5 TABLET, FILM COATED ORAL at 10:35

## 2020-09-23 ASSESSMENT — PAIN SCALES - GENERAL
PAINLEVEL_OUTOF10: 9
PAINLEVEL_OUTOF10: 7
PAINLEVEL_OUTOF10: 6

## 2020-09-23 NOTE — PROGRESS NOTES
Infectious Disease Follow up Notes    CC :  RLE cellulitis      Antibiotics:   clinda 900 IV q8  Zosyn 3.375 q8  linezolid 600 po BID     Admit Date:   9/19/2020  Hospital Day: 5    Subjective: Intermittent low grade fever. Currently afebrile  He thinks he is feeling somewhat better, pain is less intense.   He seems to be tolerating the abx well so far, denies N/V/D    Objective:     Patient Vitals for the past 8 hrs:   BP Temp Pulse SpO2   09/23/20 0900 135/71 97.8 °F (36.6 °C) 81 97 %   09/23/20 0812 -- -- -- 96 %       EXAM:  General:  Alert, oriented, NAD     HEENT:  NCAT, PERRL, sclera anicteric  NECK:  supple  LUNGS:   CTA seferino    CV:  RRR   ABD: Soft, flat, NT     EXT: Flaccid large bullae lateral aspect RLE  Erythema R foot to mid abd, faded slightly at upper margin          LINE: IV site ok         Scheduled Meds:   lidocaine 1 % injection  5 mL Intradermal Once    sodium chloride flush  10 mL Intravenous 2 times per day    polyethylene glycol  17 g Oral Daily    lactulose  10 g Oral TID    piperacillin-tazobactam  3.375 g Intravenous Q8H    heparin (porcine)  5,000 Units Subcutaneous 3 times per day    clindamycin (CLEOCIN) IV  900 mg Intravenous Q8H    lactobacillus  2 capsule Oral BID WC    linezolid  600 mg Oral 2 times per day    sennosides-docusate sodium  2 tablet Oral BID    sodium chloride flush  10 mL Intravenous 2 times per day    budesonide-formoterol  2 puff Inhalation BID    allopurinol  300 mg Oral Daily       Continuous Infusions:   sodium bicarbonate infusion 100 mL/hr at 09/23/20 1149          Data Review:    Lab Results   Component Value Date    WBC 21.9 (H) 09/23/2020    HGB 12.0 (L) 09/23/2020    HCT 35.8 (L) 09/23/2020    MCV 87.8 09/23/2020     09/23/2020     Lab Results   Component Value Date    CREATININE 1.2 09/23/2020    BUN 33 (H) 09/23/2020     (L) 09/23/2020    K 3.6 09/23/2020     09/23/2020    CO2 22 09/23/2020       Hepatic Function Panel:   Lab Results   Component Value Date    ALKPHOS 139 09/23/2020    ALT 32 09/23/2020    AST 43 09/23/2020    PROT 5.0 09/23/2020    PROT 6.9 03/29/2011    BILITOT 1.4 09/23/2020    LABALBU 1.8 09/23/2020       Cultures:   9/19     BC x2 NGTD  9/22     BC x2 NGTD         Radiology Review:  All pertinent images / reports were reviewed as a part of this visit. CT R femur 9/19/20   Impression    1. Subcutaneous fat stranding of the right lower extremity compatible with    cellulitis.  No drainable fluid collection, soft tissue gas, or evidence of    fasciitis. 2. Small nonspecific right knee effusion with mild medial compartment    degenerative changes.       Doppler 9/21/20   No evidence of deep vein or superficial thrombosis involving the right lower     extremity and the contralateral proximal common femoral vein.       MRI RLE 9/22/20   Impression    1. Diffuse subcutaneous edema compatible with cellulitis.  No abscess or    fasciitis.     2. No acute osseous abnormality.             Assessment:     Patient Active Problem List    Diagnosis Date Noted    History of pulmonary embolism      Priority: High    Hyperlipemia 03/31/2011     Priority: High    Pulmonary embolism ([address] 03/30/2011     Priority: High     Overview Note:     Replacing Inactive Diagnoses      Gout 09/01/2014     Priority: Medium    Asthma      Priority: Medium    Tremor, essential      Priority: Medium    Schatzki's ring      Priority: Medium    Toenail fungus      Priority: Low    LVH (left ventricular hypertrophy)      Priority: Low    HH (hiatus hernia)      Priority: Low    Rosacea      Priority: Low    Pneumonia 03/31/2011     Priority: Low    Dyspnea 03/31/2011     Priority: Low    Constipation 03/31/2011     Priority: Low    S/P shoulder surgery 03/31/2011     Priority: Low     Overview Note:     Right      Cellulitis of right lower extremity 09/19/2020    ULISES (acute kidney injury) ([facility] 09/19/2020    Sepsis with acute organ dysfunction ([facility] 09/19/2020    Hyponatremia 09/19/2020    Pain in limb 07/20/2015    Finger pain 07/13/2015       Severe RLE cellulitis  No guiding micro data  BC and ASO/streptozyme negative  No evidence deep seeded infection by MRI/CT     Leukocytosis - WBC down a bit today    ULISES - resolving     No abx allergies    Plan:   Continue Zosyn, clinda, linezolid as ordered  Suggest PICC given complexity of abx regimen and anticipating at least another week IV abx - RN to clear with Nephrology    Continue probiotic   Encouraged PO intake     Elevate the leg     Trying not to traumatize the bullae    Discussed with patient/family, all questions answered        Oma Sacks, MD  Phone: [phone]   Fax : [phone]

## 2020-09-23 NOTE — PROGRESS NOTES
Hospitalist Progress Note      PCP: Mina Li MD    Date of Admission: 9/19/2020    Chief Complaint: Right leg swelling    Hospital Course: Kip Calloway is a 63 y.o. male. He presents reporting 3 days of increasing right leg redness, swelling, and pain. He began to feel constitutionally  ill with chills and sweats on Wednesday (9/16) and noticed the leg changes developing Thursday. Appetite is strong. Denies nausea and vomiting. He has had loose stools. Patient denies any known RLE wound or trauma. He works as a . Patient has continued to take his prescribed meloxicam     Subjective:   Pt is on RA. Tmax 100.4. VSS. Complains of right leg pain.     Medications:  Reviewed    Infusion Medications    sodium bicarbonate infusion 100 mL/hr at 09/23/20 1149     Scheduled Medications    lidocaine 1 % injection  5 mL Intradermal Once    sodium chloride flush  10 mL Intravenous 2 times per day    polyethylene glycol  17 g Oral Daily    lactulose  10 g Oral TID    piperacillin-tazobactam  3.375 g Intravenous Q8H    heparin (porcine)  5,000 Units Subcutaneous 3 times per day    clindamycin (CLEOCIN) IV  900 mg Intravenous Q8H    lactobacillus  2 capsule Oral BID WC    linezolid  600 mg Oral 2 times per day    sennosides-docusate sodium  2 tablet Oral BID    sodium chloride flush  10 mL Intravenous 2 times per day    budesonide-formoterol  2 puff Inhalation BID    allopurinol  300 mg Oral Daily     PRN Meds: sodium chloride flush, sodium chloride flush, promethazine **OR** ondansetron, acetaminophen, melatonin ER, oxyCODONE **OR** oxyCODONE      Intake/Output Summary (Last 24 hours) at 9/23/2020 1335  Last data filed at 9/23/2020 0011  Gross per 24 hour   Intake 240 ml   Output 1525 ml   Net -1285 ml       Physical Exam Performed:    /71   Pulse 81   Temp 97.8 °F (36.6 °C)   Resp 18   Ht 6' (1.829 m)   Wt 220 lb (99.8 kg)   SpO2 97%   BMI 29.84 kg/m²     General appearance: subcutaneous edema compatible with cellulitis. No abscess or   fasciitis. 2. No acute osseous abnormality. NM LUNG SCAN PERFUSION ONLY   Final Result   Low probability for pulmonary embolus. VL Extremity Venous Right   Final Result      US RENAL COMPLETE   Final Result   No hydronephrosis noted      Increased echogenicity throughout the liver, suggesting diffuse   hepatocellular disease such as fatty infiltration         CT FEMUR RIGHT WO CONTRAST   Final Result   1. Subcutaneous fat stranding of the right lower extremity compatible with   cellulitis. No drainable fluid collection, soft tissue gas, or evidence of   fasciitis. 2. Small nonspecific right knee effusion with mild medial compartment   degenerative changes. XR TIBIA FIBULA RIGHT (2 VIEWS)   Final Result   No acute fracture or dislocation in the right tibia fibula. No destructive   osseous lesion. Soft tissue swelling. No soft tissue gas or radiopaque foreign body. XR CHEST PORTABLE   Final Result   No acute cardiopulmonary abnormality. IR PICC WO SQ PORT/PUMP > 5 YEARS    (Results Pending)           Assessment/Plan:    Active Hospital Problems    Diagnosis    History of pulmonary embolism [Z86.711]     Priority: High    Cellulitis of right lower extremity [L03.115]    ULISES (acute kidney injury) ([address] [N17.9]    Sepsis with acute organ dysfunction ([address] [A41.9, R65.20]    Hyponatremia [E87.1]       Sepsis due to right lower extremity cellulitis  - Pt started on rocephin and vancomycin on admission. Dc'd vanc and changed to zyvox due to ULISES on 9/21. Dc'd rocephin and changed to zosyn on 9/22 given fever/leukocytosis. Consulted ID. Added clindamycin. Plan for PICC line per ID.   - MRI showed diffused subcutaneous edema compatible with cellulitis, no abscess or fasciitis, no acute osseous abnormality.   - Blood cultures are NGTD. Repeat blood cultures are NGTD.   - Continue prn analgesia.      Acute kidney injury, hyponatremia, metabolic acidosis (improving)  - Baseline creatinine ~ 1.2, peaked to 3.1. Currently 1.2.  - Continue to hold his home lisinopril and meloxcam.  - Continue IVFs and follow BMP. - Renal US unremarkable. - Nephrology consulted/following.      H/O PE with significantly elevated d-dimer  - RLE venous doppler negative for DVT. - Perfusion scan negative for PE.   - Heparin gtt dc'd. Constipation  - Bowel regimen ordered.        DVT Prophylaxis: Heparin SQ  Diet: DIET GENERAL;  Code Status: Full Code    PT/OT Eval Status: hold for now    Dispo - uncertain     [address], APRN - CNP

## 2020-09-23 NOTE — CARE COORDINATION
CASE MANAGEMENT INITIAL ASSESSMENT      Reviewed chart and completed assessment with: patient  Explained Case Management role/services. Primary contact information:    Health Care Decision Maker:   Who do you trust or have selected to make healthcare decisions for you? Name:   Argelia Hernandez  Phone  Number: [phone]  Can this person be reached and be able to respond quickly, such as within a few minutes or hours? Yes  Who would be your back-up decision maker? Name Rosa Maria Carrillo, step daughter  Phone Mirna Major date/status: 9/19/2020  Diagnosis: Cellulitis RLE  Is this a Readmission?:  No  Insurance: [id number] required for SNF - Y     3 night stay required -  N    Living arrangements, Adls, care needs, prior to admission: Pt lives w/spouse in 2 story home w/many stairs. Pt stated that floor plan has too many stairs to count. Pt was driving, working construction, [employer]. Transportation: private    [address] at home: none    Services in the home and/or outpatient, prior to admission: none      PT/OT recs: not initiated at this time    [address] Notification (HEN): not initiated at this time    Barriers to discharge: none    Plan/comments: Pt plans to d/c home w/spouse when medically stable. Pt has PICC line to be placed today and per IM will be here for one more week. PT/OT will be initiated possibly on 9/24 r/t pain. DCP will follow for possible IV ABX and HHC needs when medically stable.   Nahed To RN      [facility] on chart for MD signature

## 2020-09-23 NOTE — PROGRESS NOTES
Progress Note    HISTORY     CC:   Leg pain               We are following for acute kidney injury      Subjective/   HPI:  BP stable. Not eating much. Kidney function improved. No swelling on the left. Leg pain is still pretty bad.       ROS:  Constitutional:  No fevers, No Chills, + weakness  Cardiovascular:  No palpations, no edema  Respiratory:  No wheezing, no cough  :  No hematuria, no dysuria     Social Hx:  No family at beside    Past Medical and Surgical History:  - Reviewed, no changes     EXAM       Objective/     Vitals:    09/22/20 2157 09/23/20 0249 09/23/20 0812 09/23/20 0900   BP: 126/70 122/67  135/71   Pulse: 80 84  81   Resp: 18 18     Temp: 98.8 °F (37.1 °C) 99.2 °F (37.3 °C)  97.8 °F (36.6 °C)   TempSrc: Oral Oral     SpO2: 97% 95% 96% 97%   Weight:       Height:         24HR INTAKE/OUTPUT:      Intake/Output Summary (Last 24 hours) at 9/23/2020 1401  Last data filed at 9/23/2020 5889  Gross per 24 hour   Intake 240 ml   Output 1525 ml   Net -1285 ml     Constitutional:  Alert, awake, no apparent distress  Eyes:  Pupils reactive, sclera clear   Neck:  Normal thyroid, no masses   Cardiovascular:  Regular, no rub  Respiratory:  No distress, no wheezing  Psychiatry:  Appropriate mood/affect, alert  Abdomen: +bs, soft, nt, no masses   Musculoskeletal: No LE edema, no clubbing   Lymphatics:  No LAD in neck, no supraclavicular nodes       MEDICAL DECISION MAKING       Data/  Recent Labs     09/21/20 0208 09/22/20  0853 09/23/20  0602   WBC 15.6* 24.7* 21.9*   HGB 11.5* 12.3* 12.0*   HCT 34.5* 36.6* 35.8*   MCV 88.4 87.6 87.8    206 224     Recent Labs     09/21/20  0208 09/22/20  0853 09/23/20  0602   * 131* 133*   K 3.7 3.8 3.6   CL 97* 99 101   CO2 17* 22 22   GLUCOSE 130* 106* 108*   PHOS  --  2.9 3.4   MG 2.00 2.10 2.10   BUN 68* 43* 33*   CREATININE 2.2* 1.5* 1.2   LABGLOM 30* 47* >60   GFRAA 37* 57* >60       Assessment/     Acute Kidney Injury:       - Pre-renal

## 2020-09-24 LAB
ALBUMIN SERPL-MCNC: 2 G/DL (ref 3.4–5)
ANION GAP SERPL CALCULATED.3IONS-SCNC: 8 MMOL/L (ref 3–16)
BANDED NEUTROPHILS RELATIVE PERCENT: 11 % (ref 0–7)
BASOPHILS ABSOLUTE: 0.2 K/UL (ref 0–0.2)
BASOPHILS RELATIVE PERCENT: 1 %
BUN BLDV-MCNC: 26 MG/DL (ref 7–20)
CALCIUM SERPL-MCNC: 8 MG/DL (ref 8.3–10.6)
CHLORIDE BLD-SCNC: 96 MMOL/L (ref 99–110)
CO2: 28 MMOL/L (ref 21–32)
CREAT SERPL-MCNC: 1.1 MG/DL (ref 0.8–1.3)
DOHLE BODIES: PRESENT
EOSINOPHILS ABSOLUTE: 0 K/UL (ref 0–0.6)
EOSINOPHILS RELATIVE PERCENT: 0 %
GFR AFRICAN AMERICAN: >60
GFR NON-AFRICAN AMERICAN: >60
GLUCOSE BLD-MCNC: 113 MG/DL (ref 70–99)
HCT VFR BLD CALC: 35.3 % (ref 40.5–52.5)
HEMOGLOBIN: 11.8 G/DL (ref 13.5–17.5)
LYMPHOCYTES ABSOLUTE: 1.7 K/UL (ref 1–5.1)
LYMPHOCYTES RELATIVE PERCENT: 10 %
MAGNESIUM: 2.1 MG/DL (ref 1.8–2.4)
MCH RBC QN AUTO: 29.2 PG (ref 26–34)
MCHC RBC AUTO-ENTMCNC: 33.4 G/DL (ref 31–36)
MCV RBC AUTO: 87.4 FL (ref 80–100)
METAMYELOCYTES RELATIVE PERCENT: 3 %
MONOCYTES ABSOLUTE: 0.7 K/UL (ref 0–1.3)
MONOCYTES RELATIVE PERCENT: 4 %
MYELOCYTE PERCENT: 3 %
NEUTROPHILS ABSOLUTE: 14.6 K/UL (ref 1.7–7.7)
NEUTROPHILS RELATIVE PERCENT: 68 %
PDW BLD-RTO: 13.9 % (ref 12.4–15.4)
PHOSPHORUS: 3.3 MG/DL (ref 2.5–4.9)
PLATELET # BLD: 236 K/UL (ref 135–450)
PMV BLD AUTO: 8.1 FL (ref 5–10.5)
POTASSIUM SERPL-SCNC: 3.7 MMOL/L (ref 3.5–5.1)
RBC # BLD: 4.03 M/UL (ref 4.2–5.9)
RBC # BLD: NORMAL 10*6/UL
SODIUM BLD-SCNC: 132 MMOL/L (ref 136–145)
TOXIC GRANULATION: PRESENT
WBC # BLD: 17.2 K/UL (ref 4–11)

## 2020-09-24 PROCEDURE — 2500000003 HC RX 250 WO HCPCS: Performed by: INTERNAL MEDICINE

## 2020-09-24 PROCEDURE — 6370000000 HC RX 637 (ALT 250 FOR IP): Performed by: INTERNAL MEDICINE

## 2020-09-24 PROCEDURE — 6370000000 HC RX 637 (ALT 250 FOR IP): Performed by: REGISTERED NURSE

## 2020-09-24 PROCEDURE — 2580000003 HC RX 258: Performed by: REGISTERED NURSE

## 2020-09-24 PROCEDURE — 99232 SBSQ HOSP IP/OBS MODERATE 35: CPT | Performed by: INTERNAL MEDICINE

## 2020-09-24 PROCEDURE — 83735 ASSAY OF MAGNESIUM: CPT

## 2020-09-24 PROCEDURE — 2580000003 HC RX 258: Performed by: INTERNAL MEDICINE

## 2020-09-24 PROCEDURE — 94640 AIRWAY INHALATION TREATMENT: CPT

## 2020-09-24 PROCEDURE — 6360000002 HC RX W HCPCS: Performed by: REGISTERED NURSE

## 2020-09-24 PROCEDURE — 1200000000 HC SEMI PRIVATE

## 2020-09-24 PROCEDURE — 80069 RENAL FUNCTION PANEL: CPT

## 2020-09-24 PROCEDURE — 85025 COMPLETE CBC W/AUTO DIFF WBC: CPT

## 2020-09-24 RX ORDER — OXYCODONE HYDROCHLORIDE AND ACETAMINOPHEN 5; 325 MG/1; MG/1
1 TABLET ORAL EVERY 4 HOURS PRN
Status: DISCONTINUED | OUTPATIENT
Start: 2020-09-24 | End: 2020-09-25

## 2020-09-24 RX ORDER — OXYCODONE HYDROCHLORIDE 5 MG/1
10 TABLET ORAL EVERY 4 HOURS PRN
Status: DISCONTINUED | OUTPATIENT
Start: 2020-09-24 | End: 2020-09-24

## 2020-09-24 RX ORDER — OXYCODONE HYDROCHLORIDE 5 MG/1
5 TABLET ORAL EVERY 4 HOURS PRN
Status: DISCONTINUED | OUTPATIENT
Start: 2020-09-24 | End: 2020-09-24

## 2020-09-24 RX ORDER — OXYCODONE HYDROCHLORIDE AND ACETAMINOPHEN 5; 325 MG/1; MG/1
2 TABLET ORAL EVERY 4 HOURS PRN
Status: DISCONTINUED | OUTPATIENT
Start: 2020-09-24 | End: 2020-09-25

## 2020-09-24 RX ORDER — MORPHINE SULFATE 2 MG/ML
2 INJECTION, SOLUTION INTRAMUSCULAR; INTRAVENOUS EVERY 4 HOURS PRN
Status: DISCONTINUED | OUTPATIENT
Start: 2020-09-24 | End: 2020-09-30

## 2020-09-24 RX ADMIN — OXYCODONE 10 MG: 5 TABLET ORAL at 04:23

## 2020-09-24 RX ADMIN — LACTULOSE 10 G: 20 SOLUTION ORAL at 08:41

## 2020-09-24 RX ADMIN — ALLOPURINOL 300 MG: 300 TABLET ORAL at 08:41

## 2020-09-24 RX ADMIN — Medication 2 PUFF: at 20:55

## 2020-09-24 RX ADMIN — PIPERACILLIN AND TAZOBACTAM 3.38 G: 3; .375 INJECTION, POWDER, FOR SOLUTION INTRAVENOUS at 02:20

## 2020-09-24 RX ADMIN — PIPERACILLIN AND TAZOBACTAM 3.38 G: 3; .375 INJECTION, POWDER, FOR SOLUTION INTRAVENOUS at 10:23

## 2020-09-24 RX ADMIN — Medication 900 MG: at 06:09

## 2020-09-24 RX ADMIN — HEPARIN SODIUM 5000 UNITS: 5000 INJECTION INTRAVENOUS; SUBCUTANEOUS at 14:59

## 2020-09-24 RX ADMIN — Medication 2 CAPSULE: at 17:10

## 2020-09-24 RX ADMIN — HEPARIN SODIUM 5000 UNITS: 5000 INJECTION INTRAVENOUS; SUBCUTANEOUS at 06:09

## 2020-09-24 RX ADMIN — PIPERACILLIN AND TAZOBACTAM 3.38 G: 3; .375 INJECTION, POWDER, FOR SOLUTION INTRAVENOUS at 17:09

## 2020-09-24 RX ADMIN — HEPARIN SODIUM 5000 UNITS: 5000 INJECTION INTRAVENOUS; SUBCUTANEOUS at 21:51

## 2020-09-24 RX ADMIN — LINEZOLID 600 MG: 600 TABLET, FILM COATED ORAL at 20:31

## 2020-09-24 RX ADMIN — Medication 2 CAPSULE: at 08:41

## 2020-09-24 RX ADMIN — SODIUM BICARBONATE: 84 INJECTION, SOLUTION INTRAVENOUS at 02:20

## 2020-09-24 RX ADMIN — OXYCODONE HYDROCHLORIDE AND ACETAMINOPHEN 2 TABLET: 5; 325 TABLET ORAL at 21:50

## 2020-09-24 RX ADMIN — Medication 10 ML: at 21:58

## 2020-09-24 RX ADMIN — LINEZOLID 600 MG: 600 TABLET, FILM COATED ORAL at 08:41

## 2020-09-24 RX ADMIN — LACTULOSE 10 G: 20 SOLUTION ORAL at 14:58

## 2020-09-24 RX ADMIN — Medication 900 MG: at 14:59

## 2020-09-24 RX ADMIN — OXYCODONE 10 MG: 5 TABLET ORAL at 10:57

## 2020-09-24 RX ADMIN — Medication 900 MG: at 21:58

## 2020-09-24 RX ADMIN — Medication 2 PUFF: at 07:34

## 2020-09-24 ASSESSMENT — PAIN SCALES - GENERAL
PAINLEVEL_OUTOF10: 8
PAINLEVEL_OUTOF10: 8
PAINLEVEL_OUTOF10: 7
PAINLEVEL_OUTOF10: 10

## 2020-09-24 ASSESSMENT — PAIN DESCRIPTION - LOCATION: LOCATION: LEG

## 2020-09-24 ASSESSMENT — PAIN DESCRIPTION - PAIN TYPE: TYPE: ACUTE PAIN

## 2020-09-24 ASSESSMENT — PAIN DESCRIPTION - ORIENTATION: ORIENTATION: RIGHT

## 2020-09-24 NOTE — PROGRESS NOTES
Progress Note    HISTORY     CC:   Leg pain               We are following for acute kidney injury      Subjective/   HPI:  BP stable. Still having a lot of pain in the leg. On IV fluids. Seems to be eating better. MRI with no fasciitis.       ROS:  Constitutional:  No fevers, No Chills, + weakness  Cardiovascular:  No palpations, + edema on right leg   Respiratory:  No wheezing, no cough  :  No hematuria, no dysuria     Social Hx:  No family at beside    Past Medical and Surgical History:  - Reviewed, no changes     EXAM       Objective/     Vitals:    09/24/20 0420 09/24/20 0737 09/24/20 0830 09/24/20 1422   BP: (!) 140/83  (!) 147/76 135/70   Pulse: 77  76 79   Resp: 18  16 16   Temp: 99.1 °F (37.3 °C)  97.4 °F (36.3 °C) 97.3 °F (36.3 °C)   TempSrc: Oral  Oral Oral   SpO2: 94% 95% 96% 97%   Weight:       Height:         24HR INTAKE/OUTPUT:      Intake/Output Summary (Last 24 hours) at 9/24/2020 1751  Last data filed at 9/24/2020 1422  Gross per 24 hour   Intake --   Output 600 ml   Net -600 ml     Constitutional:  Alert, awake, no apparent distress  Eyes:  Pupils reactive, sclera clear   Neck:  Normal thyroid, no masses   Cardiovascular:  Regular, no rub  Respiratory:  No distress, no wheezing  Psychiatry:  Appropriate mood/affect, alert  Abdomen: +bs, soft, nt, no masses   Musculoskeletal: No LE edema, no clubbing   Lymphatics:  No LAD in neck, no supraclavicular nodes       MEDICAL DECISION MAKING       Data/  Recent Labs     09/22/20  0853 09/23/20  0602 09/24/20  0600   WBC 24.7* 21.9* 17.2*   HGB 12.3* 12.0* 11.8*   HCT 36.6* 35.8* 35.3*   MCV 87.6 87.8 87.4    224 236     Recent Labs     09/22/20  0853 09/23/20  0602 09/24/20  0600   * 133* 132*   K 3.8 3.6 3.7   CL 99 101 96*   CO2 22 22 28   GLUCOSE 106* 108* 113*   PHOS 2.9 3.4 3.3   MG 2.10 2.10 2.10   BUN 43* 33* 26*   CREATININE 1.5* 1.2 1.1   LABGLOM 47* >60 >60   GFRAA 57* >60 >60       Assessment/     Acute Kidney Injury:       - Pre-renal        - Resolving     Right LE cellulitis       - Severe       - ID following, seems to be responding to antibiotics     Right LE edema       - doppler negative for DVT    Hypertension:  Range is good    Metabolic Acidosis:         Improved with HCO3 gtt         Plan/     Hold IV fluids   Follow labs      -----------------------------  Farhat Fernandez M.D.   [facility]

## 2020-09-24 NOTE — PROGRESS NOTES
Hospitalist Progress Note      PCP: Tera Jerry MD    Date of Admission: 9/19/2020    Chief Complaint: Right leg swelling    Hospital Course: Kiley Rice is a 63 y.o. male. He presents reporting 3 days of increasing right leg redness, swelling, and pain. He began to feel constitutionally  ill with chills and sweats on Wednesday (9/16) and noticed the leg changes developing Thursday. Appetite is strong. Denies nausea and vomiting. He has had loose stools. Patient denies any known RLE wound or trauma. He works as a . Patient has continued to take his prescribed meloxicam     Subjective:   Pt is on RA. Tmax 99.1. VSS. Complains of right leg pain. Medications:  Reviewed    Infusion Medications    sodium bicarbonate infusion 100 mL/hr at 09/24/20 0220     Scheduled Medications    sodium chloride flush  10 mL Intravenous 2 times per day    polyethylene glycol  17 g Oral Daily    lactulose  10 g Oral TID    piperacillin-tazobactam  3.375 g Intravenous Q8H    heparin (porcine)  5,000 Units Subcutaneous 3 times per day    clindamycin (CLEOCIN) IV  900 mg Intravenous Q8H    lactobacillus  2 capsule Oral BID WC    linezolid  600 mg Oral 2 times per day    sennosides-docusate sodium  2 tablet Oral BID    budesonide-formoterol  2 puff Inhalation BID    allopurinol  300 mg Oral Daily     PRN Meds: morphine, oxyCODONE-acetaminophen **OR** oxyCODONE-acetaminophen, sodium chloride flush, promethazine **OR** ondansetron, acetaminophen, melatonin ER      Intake/Output Summary (Last 24 hours) at 9/24/2020 1323  Last data filed at 9/24/2020 0203  Gross per 24 hour   Intake --   Output 400 ml   Net -400 ml       Physical Exam Performed:    BP (!) 147/76   Pulse 76   Temp 97.4 °F (36.3 °C) (Oral)   Resp 16   Ht 6' (1.829 m)   Wt 220 lb (99.8 kg)   SpO2 96%   BMI 29.84 kg/m²     General appearance: No apparent distress, appears stated age and cooperative.   HEENT: Pupils equal, round, and reactive to light. Conjunctivae/corneas clear. Neck: Supple, with full range of motion. No jugular venous distention. Trachea midline. Respiratory:  Normal respiratory effort. Clear to auscultation, bilaterally without Rales/Wheezes/Rhonchi. Cardiovascular: Regular rate and rhythm with normal S1/S2 without murmurs, rubs or gallops. Abdomen: Soft, non-tender, non-distended with normal bowel sounds. Musculoskeletal: No clubbing, cyanosis. Full range of motion without deformity. Skin: Right lower extremity very swollen, tender to light palpation, blisters to lateral side. Neurologic:  Neurovascularly intact without any focal sensory/motor deficits. Cranial nerves: II-XII intact, grossly non-focal.  Psychiatric: Alert and oriented, thought content appropriate, normal insight  Capillary Refill: Brisk,< 3 seconds   Peripheral Pulses: +2 palpable, equal bilaterally       Labs:   Recent Labs     09/22/20  0853 09/23/20  0602 09/24/20  0600   WBC 24.7* 21.9* 17.2*   HGB 12.3* 12.0* 11.8*   HCT 36.6* 35.8* 35.3*    224 236     Recent Labs     09/22/20  0853 09/23/20  0602 09/24/20  0600   * 133* 132*   K 3.8 3.6 3.7   CL 99 101 96*   CO2 22 22 28   BUN 43* 33* 26*   CREATININE 1.5* 1.2 1.1   CALCIUM 8.6 8.4 8.0*   PHOS 2.9 3.4 3.3     Recent Labs     09/23/20  0602   AST 43*   ALT 32   BILITOT 1.4*   ALKPHOS 139*     No results for input(s): INR in the last 72 hours. Recent Labs     09/23/20  0602   CKTOTAL 15*       Urinalysis:      Lab Results   Component Value Date    NITRU Negative 09/21/2020    WBCUA 0-2 09/21/2020    BACTERIA 4+ 09/21/2020    RBCUA None seen 09/21/2020    BLOODU TRACE-INTACT 09/21/2020    SPECGRAV 1.020 09/21/2020    GLUCOSEU Negative 09/21/2020    GLUCOSEU NEGATIVE 03/29/2011       Radiology:  IR PICC WO SQ PORT/PUMP > 5 YEARS   Final Result   Successful placement of PICC line. MRI TIBIA FIBULA LEFT W WO CONTRAST   Final Result   1.  Diffuse subcutaneous edema compatible with cellulitis. No abscess or   fasciitis. 2. No acute osseous abnormality. NM LUNG SCAN PERFUSION ONLY   Final Result   Low probability for pulmonary embolus. VL Extremity Venous Right   Final Result      US RENAL COMPLETE   Final Result   No hydronephrosis noted      Increased echogenicity throughout the liver, suggesting diffuse   hepatocellular disease such as fatty infiltration         CT FEMUR RIGHT WO CONTRAST   Final Result   1. Subcutaneous fat stranding of the right lower extremity compatible with   cellulitis. No drainable fluid collection, soft tissue gas, or evidence of   fasciitis. 2. Small nonspecific right knee effusion with mild medial compartment   degenerative changes. XR TIBIA FIBULA RIGHT (2 VIEWS)   Final Result   No acute fracture or dislocation in the right tibia fibula. No destructive   osseous lesion. Soft tissue swelling. No soft tissue gas or radiopaque foreign body. XR CHEST PORTABLE   Final Result   No acute cardiopulmonary abnormality. Assessment/Plan:    Active Hospital Problems    Diagnosis    History of pulmonary embolism [Z86.711]     Priority: High    Cellulitis of right lower extremity [L03.115]    ULISES (acute kidney injury) ([facility] [address] [N17.9]    Sepsis with acute organ dysfunction ([address] [A41.9, R65.20]    Hyponatremia [E87.1]       Sepsis due to severe right lower extremity cellulitis  - Pt started on rocephin and vancomycin on admission. Dc'd vanc and changed to zyvox due to ULISES on 9/21. Dc'd rocephin and changed to zosyn on 9/22 given fever/worsening leukocytosis. Consulted ID. Added clindamycin and probiotic on 9/22. PICC placed on 9/23.  - MRI obtained and showed diffused subcutaneous edema compatible with cellulitis, no abscess or fasciitis, no acute osseous abnormality.   - Blood cultures are NGTD. Repeat blood cultures are NGTD.   - Continue prn analgesia. - Elevate RLE as tolerated.      Acute kidney injury, hyponatremia, metabolic acidosis (improving)  - Baseline creatinine ~ 1.2, peaked to 3.1. Currently 1.1.  - Continue to hold his home lisinopril and meloxcam.  - Improving with IV/bicarb fluids.   - Renal US unremarkable. - Nephrology consulted/following.      H/O PE with significantly elevated d-dimer  - RLE venous doppler negative for DVT. - Perfusion scan negative for PE.   - Heparin gtt dc'd. Constipation (resolved)  - Continue bowel regimen.        DVT Prophylaxis: Heparin SQ  Diet: DIET GENERAL;  Code Status: Full Code    PT/OT Eval Status: hold for now    Dispo - uncertain     [address], APRN - CNP

## 2020-09-24 NOTE — PROGRESS NOTES
Infectious Disease Follow up Notes    CC :  RLE cellulitis      Antibiotics:   clinda 900 IV q8  Zosyn 3.375 q8  linezolid 600 po BID     Admit Date:   9/19/2020  Hospital Day: 6    Subjective:   Afebrile today   Feeling a little better.   No apparent side effects from abx     Objective:     Patient Vitals for the past 8 hrs:   BP Temp Temp src Pulse Resp SpO2   09/24/20 1422 135/70 97.3 °F (36.3 °C) Oral 79 16 97 %   09/24/20 0830 (!) 147/76 97.4 °F (36.3 °C) Oral 76 16 96 %   09/24/20 0737 -- -- -- -- -- 95 %       EXAM:  General:  Alert, oriented, NAD     HEENT:  NCAT, PERRL, sclera anicteric  NECK:  supple  LUNGS:   CTA seferino    CV:  RRR   ABD: Soft, flat, NT     EXT: Flaccid large bullae lateral aspect RLE  Erythema R foot to mid abd, faded slightly at upper margin          LINE:  PICC placed 9/23/20 - site ok         Scheduled Meds:   sodium chloride flush  10 mL Intravenous 2 times per day    polyethylene glycol  17 g Oral Daily    lactulose  10 g Oral TID    piperacillin-tazobactam  3.375 g Intravenous Q8H    heparin (porcine)  5,000 Units Subcutaneous 3 times per day    clindamycin (CLEOCIN) IV  900 mg Intravenous Q8H    lactobacillus  2 capsule Oral BID WC    linezolid  600 mg Oral 2 times per day    sennosides-docusate sodium  2 tablet Oral BID    budesonide-formoterol  2 puff Inhalation BID    allopurinol  300 mg Oral Daily       Continuous Infusions:   sodium bicarbonate infusion 100 mL/hr at 09/24/20 0220          Data Review:    Lab Results   Component Value Date    WBC 17.2 (H) 09/24/2020    HGB 11.8 (L) 09/24/2020    HCT 35.3 (L) 09/24/2020    MCV 87.4 09/24/2020     09/24/2020     Lab Results   Component Value Date    CREATININE 1.1 09/24/2020    BUN 26 (H) 09/24/2020     (L) 09/24/2020    K 3.7 09/24/2020    CL 96 (L) 09/24/2020    CO2 28 09/24/2020       Hepatic Function Panel:   Lab Results Component Value Date    ALKPHOS 139 09/23/2020    ALT 32 09/23/2020    AST 43 09/23/2020    PROT 5.0 09/23/2020    PROT 6.9 03/29/2011    BILITOT 1.4 09/23/2020    LABALBU 2.0 09/24/2020       Cultures:   9/19     BC x2 NGTD  9/22     BC x2 NGTD         Radiology Review:  All pertinent images / reports were reviewed as a part of this visit. CT R femur 9/19/20   Impression    1. Subcutaneous fat stranding of the right lower extremity compatible with    cellulitis.  No drainable fluid collection, soft tissue gas, or evidence of    fasciitis. 2. Small nonspecific right knee effusion with mild medial compartment    degenerative changes.       Doppler 9/21/20   No evidence of deep vein or superficial thrombosis involving the right lower     extremity and the contralateral proximal common femoral vein.       MRI RLE 9/22/20   Impression    1. Diffuse subcutaneous edema compatible with cellulitis.  No abscess or    fasciitis.     2. No acute osseous abnormality.             Assessment:     Patient Active Problem List    Diagnosis Date Noted    History of pulmonary embolism      Priority: High    Hyperlipemia 03/31/2011     Priority: High    Pulmonary embolism ([address] 03/30/2011     Priority: High     Overview Note:     Replacing Inactive Diagnoses      Gout 09/01/2014     Priority: Medium    Asthma      Priority: Medium    Tremor, essential      Priority: Medium    Schatzki's ring      Priority: Medium    Toenail fungus      Priority: Low    LVH (left ventricular hypertrophy)      Priority: Low    HH (hiatus hernia)      Priority: Low    Rosacea      Priority: Low    Pneumonia 03/31/2011     Priority: Low    Dyspnea 03/31/2011     Priority: Low    Constipation 03/31/2011     Priority: Low    S/P shoulder surgery 03/31/2011     Priority: Low     Overview Note:     Right      Cellulitis of right lower extremity 09/19/2020    ULISES (acute kidney injury) ([facility] [address] 09/19/2020    Sepsis with acute organ dysfunction ([facility] [address] 09/19/2020    Hyponatremia 09/19/2020    Pain in limb 07/20/2015    Finger pain 07/13/2015       Severe RLE cellulitis  No guiding micro data  BC and ASO/streptozyme negative  No evidence deep seeded infection by MRI/CT     Leukocytosis - WBC continues to delcine    ULISES - resolving     No abx allergies    Plan:   Continue Zosyn, clinda, linezolid as ordered  Expect slow improvement       Discussed with patient/family, all questions answered        Andrei Palacio MD  Phone: [phone]   Fax : [phone]

## 2020-09-24 NOTE — FLOWSHEET NOTE
Pt call light heavy, calling out numerous times for tasks done repeatedly such as: pouring cups of ice water, tidying up bedside table, charging cell phone, emptying (multiple at bedside) urinals, and changing bed pads (due to leg drainage) even if tasks just recently performed. Pt upset, reports that he had been calling for pain although pt never mentioned it when calling out to PCA nor RN nor . Explained to pt that staff were busy and importance of clustering activities/tasks. Pt verbalizes understanding, call light within reach. Pt bed/gown changed and given pain medicine. Other tasks pt requesting completed at this time, pt denies any other needs at this time, will continue to monitor.

## 2020-09-25 LAB
ALBUMIN SERPL-MCNC: 2.1 G/DL (ref 3.4–5)
ANION GAP SERPL CALCULATED.3IONS-SCNC: 9 MMOL/L (ref 3–16)
BANDED NEUTROPHILS RELATIVE PERCENT: 2 % (ref 0–7)
BASOPHILS ABSOLUTE: 0.2 K/UL (ref 0–0.2)
BASOPHILS RELATIVE PERCENT: 2 %
BUN BLDV-MCNC: 21 MG/DL (ref 7–20)
CALCIUM SERPL-MCNC: 8.4 MG/DL (ref 8.3–10.6)
CHLORIDE BLD-SCNC: 97 MMOL/L (ref 99–110)
CO2: 27 MMOL/L (ref 21–32)
CREAT SERPL-MCNC: 1 MG/DL (ref 0.8–1.3)
EOSINOPHILS ABSOLUTE: 0.3 K/UL (ref 0–0.6)
EOSINOPHILS RELATIVE PERCENT: 3 %
GFR AFRICAN AMERICAN: >60
GFR NON-AFRICAN AMERICAN: >60
GLUCOSE BLD-MCNC: 120 MG/DL (ref 70–99)
HCT VFR BLD CALC: 34.3 % (ref 40.5–52.5)
HEMOGLOBIN: 12 G/DL (ref 13.5–17.5)
LYMPHOCYTES ABSOLUTE: 0.9 K/UL (ref 1–5.1)
LYMPHOCYTES RELATIVE PERCENT: 9 %
MAGNESIUM: 2 MG/DL (ref 1.8–2.4)
MCH RBC QN AUTO: 30.7 PG (ref 26–34)
MCHC RBC AUTO-ENTMCNC: 34.9 G/DL (ref 31–36)
MCV RBC AUTO: 87.9 FL (ref 80–100)
METAMYELOCYTES RELATIVE PERCENT: 3 %
MONOCYTES ABSOLUTE: 0.4 K/UL (ref 0–1.3)
MONOCYTES RELATIVE PERCENT: 4 %
NEUTROPHILS ABSOLUTE: 8.4 K/UL (ref 1.7–7.7)
NEUTROPHILS RELATIVE PERCENT: 77 %
PDW BLD-RTO: 13.6 % (ref 12.4–15.4)
PHOSPHORUS: 3.2 MG/DL (ref 2.5–4.9)
PLATELET # BLD: 234 K/UL (ref 135–450)
PLATELET SLIDE REVIEW: ADEQUATE
PMV BLD AUTO: 7.5 FL (ref 5–10.5)
POTASSIUM SERPL-SCNC: 3.6 MMOL/L (ref 3.5–5.1)
RBC # BLD: 3.9 M/UL (ref 4.2–5.9)
RBC # BLD: NORMAL 10*6/UL
SLIDE REVIEW: ABNORMAL
SODIUM BLD-SCNC: 133 MMOL/L (ref 136–145)
WBC # BLD: 10.2 K/UL (ref 4–11)

## 2020-09-25 PROCEDURE — 2580000003 HC RX 258: Performed by: REGISTERED NURSE

## 2020-09-25 PROCEDURE — 6360000002 HC RX W HCPCS: Performed by: REGISTERED NURSE

## 2020-09-25 PROCEDURE — 99232 SBSQ HOSP IP/OBS MODERATE 35: CPT | Performed by: INTERNAL MEDICINE

## 2020-09-25 PROCEDURE — 1200000000 HC SEMI PRIVATE

## 2020-09-25 PROCEDURE — 94640 AIRWAY INHALATION TREATMENT: CPT

## 2020-09-25 PROCEDURE — 97535 SELF CARE MNGMENT TRAINING: CPT

## 2020-09-25 PROCEDURE — 6370000000 HC RX 637 (ALT 250 FOR IP): Performed by: REGISTERED NURSE

## 2020-09-25 PROCEDURE — 97162 PT EVAL MOD COMPLEX 30 MIN: CPT

## 2020-09-25 PROCEDURE — 6370000000 HC RX 637 (ALT 250 FOR IP): Performed by: INTERNAL MEDICINE

## 2020-09-25 PROCEDURE — 97166 OT EVAL MOD COMPLEX 45 MIN: CPT

## 2020-09-25 PROCEDURE — 85025 COMPLETE CBC W/AUTO DIFF WBC: CPT

## 2020-09-25 PROCEDURE — 2580000003 HC RX 258: Performed by: INTERNAL MEDICINE

## 2020-09-25 PROCEDURE — 83735 ASSAY OF MAGNESIUM: CPT

## 2020-09-25 PROCEDURE — 2500000003 HC RX 250 WO HCPCS: Performed by: INTERNAL MEDICINE

## 2020-09-25 PROCEDURE — 80069 RENAL FUNCTION PANEL: CPT

## 2020-09-25 PROCEDURE — 97530 THERAPEUTIC ACTIVITIES: CPT

## 2020-09-25 RX ORDER — OXYCODONE HYDROCHLORIDE 5 MG/1
10 TABLET ORAL EVERY 4 HOURS PRN
Status: DISCONTINUED | OUTPATIENT
Start: 2020-09-25 | End: 2020-09-30

## 2020-09-25 RX ORDER — OXYCODONE HYDROCHLORIDE 5 MG/1
5 TABLET ORAL EVERY 4 HOURS PRN
Status: DISCONTINUED | OUTPATIENT
Start: 2020-09-25 | End: 2020-09-30 | Stop reason: HOSPADM

## 2020-09-25 RX ADMIN — HEPARIN SODIUM 5000 UNITS: 5000 INJECTION INTRAVENOUS; SUBCUTANEOUS at 13:36

## 2020-09-25 RX ADMIN — Medication 900 MG: at 14:25

## 2020-09-25 RX ADMIN — Medication 900 MG: at 06:28

## 2020-09-25 RX ADMIN — HEPARIN SODIUM 5000 UNITS: 5000 INJECTION INTRAVENOUS; SUBCUTANEOUS at 21:22

## 2020-09-25 RX ADMIN — OXYCODONE HYDROCHLORIDE AND ACETAMINOPHEN 1 TABLET: 5; 325 TABLET ORAL at 01:55

## 2020-09-25 RX ADMIN — Medication 2 PUFF: at 20:20

## 2020-09-25 RX ADMIN — LINEZOLID 600 MG: 600 TABLET, FILM COATED ORAL at 09:07

## 2020-09-25 RX ADMIN — PIPERACILLIN AND TAZOBACTAM 3.38 G: 3; .375 INJECTION, POWDER, FOR SOLUTION INTRAVENOUS at 17:00

## 2020-09-25 RX ADMIN — Medication 2 CAPSULE: at 16:59

## 2020-09-25 RX ADMIN — Medication 2 CAPSULE: at 09:02

## 2020-09-25 RX ADMIN — Medication 900 MG: at 21:26

## 2020-09-25 RX ADMIN — OXYCODONE HYDROCHLORIDE 10 MG: 5 TABLET ORAL at 18:20

## 2020-09-25 RX ADMIN — Medication 10 ML: at 09:03

## 2020-09-25 RX ADMIN — HEPARIN SODIUM 5000 UNITS: 5000 INJECTION INTRAVENOUS; SUBCUTANEOUS at 04:51

## 2020-09-25 RX ADMIN — OXYCODONE HYDROCHLORIDE 10 MG: 5 TABLET ORAL at 23:02

## 2020-09-25 RX ADMIN — ALLOPURINOL 300 MG: 300 TABLET ORAL at 09:07

## 2020-09-25 RX ADMIN — PIPERACILLIN AND TAZOBACTAM 3.38 G: 3; .375 INJECTION, POWDER, FOR SOLUTION INTRAVENOUS at 01:33

## 2020-09-25 RX ADMIN — OXYCODONE HYDROCHLORIDE AND ACETAMINOPHEN 2 TABLET: 5; 325 TABLET ORAL at 09:00

## 2020-09-25 RX ADMIN — PIPERACILLIN AND TAZOBACTAM 3.38 G: 3; .375 INJECTION, POWDER, FOR SOLUTION INTRAVENOUS at 09:05

## 2020-09-25 ASSESSMENT — PAIN SCALES - GENERAL
PAINLEVEL_OUTOF10: 7
PAINLEVEL_OUTOF10: 2
PAINLEVEL_OUTOF10: 6
PAINLEVEL_OUTOF10: 6
PAINLEVEL_OUTOF10: 7
PAINLEVEL_OUTOF10: 7
PAINLEVEL_OUTOF10: 6

## 2020-09-25 ASSESSMENT — PAIN DESCRIPTION - PAIN TYPE
TYPE: ACUTE PAIN

## 2020-09-25 ASSESSMENT — PAIN DESCRIPTION - LOCATION
LOCATION: LEG

## 2020-09-25 ASSESSMENT — PAIN DESCRIPTION - ORIENTATION
ORIENTATION: RIGHT;LEFT
ORIENTATION: RIGHT
ORIENTATION: RIGHT

## 2020-09-25 NOTE — ADT AUTH CERT
gtt.    - RLE venous doppler negative for DVT. - Obtain perfusion scan to rule out PE.         Constipation    - Bowel regimen ordered.

## 2020-09-25 NOTE — PROGRESS NOTES
09/23/2020    AST 43 09/23/2020    PROT 5.0 09/23/2020    PROT 6.9 03/29/2011    BILITOT 1.4 09/23/2020    LABALBU 2.1 09/25/2020       Cultures:   9/19     BC x2 NGTD  9/22     BC x2 NGTD         Radiology Review:  All pertinent images / reports were reviewed as a part of this visit. CT R femur 9/19/20   Impression    1. Subcutaneous fat stranding of the right lower extremity compatible with    cellulitis.  No drainable fluid collection, soft tissue gas, or evidence of    fasciitis. 2. Small nonspecific right knee effusion with mild medial compartment    degenerative changes.       Doppler 9/21/20   No evidence of deep vein or superficial thrombosis involving the right lower     extremity and the contralateral proximal common femoral vein.       MRI RLE 9/22/20   Impression    1. Diffuse subcutaneous edema compatible with cellulitis.  No abscess or    fasciitis.     2. No acute osseous abnormality.           Assessment:     Patient Active Problem List    Diagnosis Date Noted    History of pulmonary embolism      Priority: High    Hyperlipemia 03/31/2011     Priority: High    Pulmonary embolism ([facility] [address] 03/30/2011     Priority: High     Overview Note:     Replacing Inactive Diagnoses      Gout 09/01/2014     Priority: Medium    Asthma      Priority: Medium    Tremor, essential      Priority: Medium    Schatzki's ring      Priority: Medium    Toenail fungus      Priority: Low    LVH (left ventricular hypertrophy)      Priority: Low    HH (hiatus hernia)      Priority: Low    Rosacea      Priority: Low    Pneumonia 03/31/2011     Priority: Low    Dyspnea 03/31/2011     Priority: Low    Constipation 03/31/2011     Priority: Low    S/P shoulder surgery 03/31/2011     Priority: Low     Overview Note:     Right      Cellulitis of right lower extremity 09/19/2020    ULISES (acute kidney injury) ([facility] [address] 09/19/2020    Sepsis with acute organ dysfunction ([facility] [address] 09/19/2020    Hyponatremia 09/19/2020    Pain in limb 07/20/2015    Finger pain 07/13/2015       Severe RLE cellulitis  No guiding micro data  BC and ASO/streptozyme negative  No evidence deep seeded infection by MRI/CT     Leukocytosis - resolved    ULISES - resolving     No abx allergies    Plan:   Continue Zosyn, clinda through the weekend     DC linezolid    Expect slow improvement     May or may not need IV abx after DC, will see him again on Monday to determine  Please call with questions over the weekend       Discussed with patient/family, all questions answered        Jeri Barone MD  Phone: [phone]   Fax : [phone]

## 2020-09-25 NOTE — PROGRESS NOTES
Physical Therapy    Facility/Department: [facility] - MED SURG/ORTHO  Initial Assessment and Treatment    NAME: Phi Faust  : 1955  MRN: [id number]    Date of Service: 2020    Discharge Recommendations:  Continue to assess pending progress   PT Equipment Recommendations  Equipment Needed: No(pt has a lot of equipment available.)    Assessment   Body structures, Functions, Activity limitations: Decreased functional mobility ; Decreased ROM; Decreased strength;Decreased balance; Increased pain  Assessment: Pt is a 64 y/o male presenting to [facility] with right LE cellulitis. PTA, pt was IND with all functional mobility and very active. Pt has 12 steps to enter his home. Pt is very limited by pain this date and required Min A for bed mobility, Mod A for sit<>stand, and unable to take steps d/t pain. Pt has 24hr supervision, but not assist at home and has 12 steps to enter home. Continue to assess appropriate d/c plan after stair evaluation. Pt will benefit from continued PT. Treatment Diagnosis: Decreased functional mobility, increased pain  Prognosis: Good  Decision Making: Medium Complexity  PT Education: Goals;PT Role;Plan of Care;Home Exercise Program  Patient Education: Educated on ankle pumps and role of elevation. Pt verbalized understanding. Barriers to Learning: none  REQUIRES PT FOLLOW UP: Yes  Activity Tolerance  Activity Tolerance: Patient limited by pain       Patient Diagnosis(es): The primary encounter diagnosis was Cellulitis of right lower extremity. Diagnoses of Septicemia ([facility] [address] and Acute renal failure, unspecified acute renal failure type [facility]) were also pertinent to this visit. has a past medical history of Asthma, Enlarged prostate, Gout, HH (hiatus hernia), Hypertension, LVH (left ventricular hypertrophy), Pulmonary embolus ([address] Rosacea, Schatzki's ring, Toenail fungus, Tremor, essential, and Wears glasses. has a past surgical history that includes Esophagus dilation;  Shoulder arthroscopy (3/11 ); Upper gastrointestinal endoscopy (06/2010); Colonoscopy (06/2010); Intracapsular cataract extraction (Left, 2/26/2020); and Intracapsular cataract extraction (Right, 5/13/2020). Restrictions  Restrictions/Precautions  Restrictions/Precautions: Up as Tolerated  Vision/Hearing  Vision: Within Functional Limits  Hearing: Within functional limits     Subjective  General  Chart Reviewed: Yes  Patient assessed for rehabilitation services?: Yes  Response To Previous Treatment: Not applicable  Family / Caregiver Present: No  Referring Practitioner: RERE Waldron CNP  Referral Date : 09/25/20  Subjective  Subjective: Pt agreeable to therapy, though very painful. Pain Screening  Patient Currently in Pain: Yes  Pain Assessment  Pain Assessment: 0-10  Pain Level: 6  Pain Type: Acute pain  Pain Location: Leg  Pain Orientation: Right  Non-Pharmaceutical Pain Intervention(s): Elevation;Repositioned; Rest  Vital Signs  Patient Currently in Pain: Yes       Orientation  Orientation  Overall Orientation Status: Within Functional Limits  Social/Functional History  Social/Functional History  Lives With: Spouse(Wife has MS, CMT and pt assist her up the steps.)  Type of Home: House  Home Layout: Two level  Home Access: Stairs to enter with rails  Entrance Stairs - Number of Steps: 12 LUCERO with curved staircase  Bathroom Shower/Tub: Tub/Shower unit, Walk-in shower(Pt's wife feels more comfortable in the tub)  Bathroom Toilet: Standard  Bathroom Equipment: Hand-held shower, Shower chair, Grab bars in shower, Grab bars around toilet  Home Equipment: Rolling walker, Cane  ADL Assistance: Independent  Homemaking Assistance: Independent  Homemaking Responsibilities: Yes  Ambulation Assistance: Independent  Transfer Assistance: Independent  Active : Yes  Occupation: Full time employment  Type of occupation: [employer] facility  Additional Comments: Scheduled right knee surgery in the future being pushed pack d/t COVID. Denies falls in past 3 months. Has been using a cane for the past few days d/t pain. Cognition   Cognition  Overall Cognitive Status: WNL    Objective     Observation/Palpation  Observation: Bright red and yellow wound on right LE from mid calf up to mid thigh    AROM RLE (degrees)  RLE AROM: Exceptions  RLE General AROM: D/t pain, WFL ankle, exceptions to knee and hip d/t pain  PROM LLE (degrees)  LLE PROM: WFL  AROM LLE (degrees)  LLE AROM : WFL  Strength RLE  Strength RLE: Exception  Comment: D/t pain, assist for SLR  Strength LLE  Strength LLE: WFL     Sensation  Overall Sensation Status: WFL  Bed mobility  Supine to Sit: Minimal assistance(for managing right LE d/t pain)  Sit to Supine: Minimal assistance(for managing right LE d/t pain)  Scooting: Minimal assistance(for managing right LE d/t pain to the EOB)  Transfers  Sit to Stand: Moderate Assistance(with SW, very slow to complete, very painful.)  Stand to sit: Moderate Assistance  Ambulation  Ambulation?: No(Unable to take steps this date d/t pain, infer pt will require assist x2 for ambulation with AD.)     Balance  Posture: Fair  Sitting - Static: Good  Sitting - Dynamic: Good  Standing - Static: Fair  Comments: with RW, Mod A x1 for sit<>stand, Min A x1 for static standing. Plan   Plan  Times per week: 3-5x  Times per day: Daily  Current Treatment Recommendations: Strengthening, ROM, Balance Training, Functional Mobility Training, Transfer Training, Stair training, Gait Training, Endurance Training, Pain Management, Home Exercise Program, Safety Education & Training, Patient/Caregiver Education & Training  Safety Devices  Type of devices:  All fall risk precautions in place, Call light within reach, Chair alarm in place, Gait belt, Left in bed, Nurse notified(RN stated he did not need an alarm set.)    AM-PAC Score  AM-PAC Inpatient Mobility Raw Score : 14 (09/25/20 1505)  AM-PAC Inpatient T-Scale Score : 38.1 (09/25/20 1505)  Mobility Inpatient CMS 0-100% Score: 61.29 (09/25/20 1505)  Mobility Inpatient CMS G-Code Modifier : CL (09/25/20 1505)        Goals  Short term goals  Time Frame for Short term goals: 5-7 days unless otherwise stated, by 10/2/20  Short term goal 1: Pt will complete bed mobility with independence. Short term goal 2: Pt will complete sit<>stand with independence. Short term goal 3: Pt will ambulate 25 feet with LRAD and SBA. Short term goal 4: Pt will ascend/descend 12 steps with LRAD and SBA. Patient Goals   Patient goals : To go home     Therapy Time   Individual Concurrent Group Co-treatment   Time In 1344         Time Out 1414         Minutes 30         Timed Code Treatment Minutes: 20 Minutes(10 minute eval)     If the pt is discharged prior to the next treatment session, this will serve as the discharge summary.      Jam Hermosillo, PT

## 2020-09-25 NOTE — CARE COORDINATION
Chart review day 6:  Pt followed by IM, ID, and nephro;  PT/OT ordered today in order to prevent pt declining physically. Pt here w/severe R leg cellulitis w/improving ULISES;  Per IM, pt will be here until next week. Pt currently clindamycin and piperacillin. Gail Urena from [facility] placed cost for current IV ABX and Elaine Jacobo from [facility] contacted to follow for when pt is medically stable for d/c.   Marianne Graham RN

## 2020-09-25 NOTE — PROGRESS NOTES
Occupational Therapy   Occupational Therapy Initial Assessment and Treatment Note  Date: 2020   Patient Name: Binta Hamilton  MRN: [id number]     : 1955    Date of Service: 2020    Discharge Recommendations:  Continue to assess pending progress  OT Equipment Recommendations  Other: If d/c home, pt will need 3-1 commode for safe toilet transfers due to limited mobility. Assessment   Performance deficits / Impairments: Decreased functional mobility ; Decreased ADL status  Prognosis: Good  Decision Making: Medium Complexity  OT Education: OT Role;Plan of Care;ADL Adaptive Strategies;Transfer Training;Equipment  Patient Education: disease specific ed:  role of OT, safe mobility, home safety. Cont OT ed. REQUIRES OT FOLLOW UP: Yes  Activity Tolerance  Activity Tolerance: Patient limited by pain  Safety Devices  Safety Devices in place: Yes  Type of devices: Gait belt;Call light within reach; Left in bed(Pt refused bed alarm)         Patient Diagnosis(es): The primary encounter diagnosis was Cellulitis of right lower extremity. Diagnoses of Septicemia ([facility] [address] and Acute renal failure, unspecified acute renal failure type [facility]) were also pertinent to this visit. has a past medical history of Asthma, Enlarged prostate, Gout, HH (hiatus hernia), Hypertension, LVH (left ventricular hypertrophy), Pulmonary embolus ([address] Rosacea, Schatzki's ring, Toenail fungus, Tremor, essential, and Wears glasses. has a past surgical history that includes Esophagus dilation; Shoulder arthroscopy (3/11 ); Upper gastrointestinal endoscopy (2010); Colonoscopy (2010); Intracapsular cataract extraction (Left, 2020); and Intracapsular cataract extraction (Right, 2020).        Restrictions  Restrictions/Precautions  Restrictions/Precautions: Up as Tolerated    Subjective   General  Chart Reviewed: Yes  Patient assessed for rehabilitation services?: Yes  Family / Caregiver Present: No  Referring Practitioner: D Saeed  Diagnosis: RLE cellulitis  Subjective  Subjective: Pt agreeable to OT eval and tx  General Comment  Comments: RN approved therapy  Pain Assessment  Pain Assessment: 0-10  Pain Level: 6  Non-Pharmaceutical Pain Intervention(s): Elevation;Repositioned; Rest  Response to Pain Intervention: Patient Satisfied  Social/Functional History  Social/Functional History  Lives With: Spouse(Wife has MS, CMT and pt assist her up the steps.)  Type of Home: House  Home Layout: Two level  Home Access: Stairs to enter with rails  Entrance Stairs - Number of Steps: 12 LUCERO with curved staircase  Bathroom Shower/Tub: Tub/Shower unit, Walk-in shower(Pt's wife feels more comfortable in the tub)  Bathroom Toilet: Standard  Bathroom Equipment: Hand-held shower, Shower chair, Grab bars in shower, Grab bars around toilet  Home Equipment: Rolling walker, Cane  ADL Assistance: Independent  Homemaking Assistance: Independent  Homemaking Responsibilities: Yes  Ambulation Assistance: Independent  Transfer Assistance: Independent  Active : Yes  Occupation: Full time employment  Type of occupation: [employer] facility  Additional Comments: Scheduled right knee surgery in the future being pushed pack d/t COVID. Denies falls in past 3 months. Has been using a cane for the past few days d/t pain. Objective   Vision: Within Functional Limits  Hearing: Within functional limits    Orientation  Overall Orientation Status: Within Normal Limits  Observation/Palpation  Observation: Bright red and yellow wound on right LE from mid calf up to mid thigh  Balance  Sitting Balance: Stand by assistance  Standing Balance: Moderate assistance(SW)  Standing Balance  Activity: Stood at EOB with SW with mod A. Pt unable to tolerate further ambulation or transfer to chair d/t pain in RLE. Returned to supine.   ADL  Feeding: Independent  UE Bathing: Setup(seated)  LE Dressing: Maximum assistance(for socks)  Toileting: Setup(urinal)  Tone RUE  RUE Tone: Normotonic  Tone LUE  LUE Tone: Normotonic  Coordination  Movements Are Fluid And Coordinated: Yes     Bed mobility  Supine to Sit: Minimal assistance(for RLE)  Sit to Supine: Minimal assistance(for RLE)  Transfers  Sit to stand: Moderate assistance(SW)  Stand to sit: Moderate assistance     Cognition  Overall Cognitive Status: WFL        Sensation  Overall Sensation Status: WFL      LUE AROM (degrees)  LUE AROM : WFL  RUE AROM (degrees)  RUE AROM : WFL  LUE Strength  Gross LUE Strength: WFL  RUE Strength  Gross RUE Strength: WFL       Plan   Plan  Times per week: 3-5x    AM-PAC Score      AM-PAC Inpatient Daily Activity Raw Score: 18 (09/25/20 1548)  AM-PAC Inpatient ADL T-Scale Score : 38.66 (09/25/20 1548)  ADL Inpatient CMS 0-100% Score: 46.65 (09/25/20 1548)  ADL Inpatient CMS G-Code Modifier : CK (09/25/20 1548)  Goals  Short term goals  Time Frame for Short term goals: for 1 week (10/2) unless noted  Short term goal 1: Perform functional transfer with SBA with walker by 9/30  Short term goal 2: Perform bathroom mobility with SBA with walker  Short term goal 3: Perform toileting with SBA  Patient Goals   Patient goals : \"Be able to walk\"    If pt is discharged prior to next OT session, this note will serve as the discharge summary.   Therapy Time   Individual Concurrent Group Co-treatment   Time In 8237         Time Out 1417         Minutes 34         Timed Code Treatment Minutes: 24 Minutes(10 min eval)     Lucie Beyer OT

## 2020-09-25 NOTE — PROGRESS NOTES
Supple, with full range of motion. No jugular venous distention. Trachea midline. Respiratory:  Normal respiratory effort. Clear to auscultation, bilaterally without Rales/Wheezes/Rhonchi. Cardiovascular: Regular rate and rhythm with normal S1/S2 without murmurs, rubs or gallops. Abdomen: Soft, non-tender, non-distended with normal bowel sounds. Musculoskeletal: No clubbing, cyanosis. Full range of motion without deformity. Skin: Right lower extremity very swollen, tender to light palpation, blisters to lateral side. Neurologic:  Neurovascularly intact without any focal sensory/motor deficits. Cranial nerves: II-XII intact, grossly non-focal.  Psychiatric: Alert and oriented, thought content appropriate, normal insight  Capillary Refill: Brisk,< 3 seconds   Peripheral Pulses: +2 palpable, equal bilaterally       Labs:   Recent Labs     09/23/20  0602 09/24/20  0600 09/25/20  0625   WBC 21.9* 17.2* 10.2   HGB 12.0* 11.8* 12.0*   HCT 35.8* 35.3* 34.3*    236 234     Recent Labs     09/23/20  0602 09/24/20  0600 09/25/20  0625   * 132* 133*   K 3.6 3.7 3.6    96* 97*   CO2 22 28 27   BUN 33* 26* 21*   CREATININE 1.2 1.1 1.0   CALCIUM 8.4 8.0* 8.4   PHOS 3.4 3.3 3.2     Recent Labs     09/23/20  0602   AST 43*   ALT 32   BILITOT 1.4*   ALKPHOS 139*     Recent Labs     09/23/20  0602   CKTOTAL 15*       Urinalysis:      Lab Results   Component Value Date    NITRU Negative 09/21/2020    WBCUA 0-2 09/21/2020    BACTERIA 4+ 09/21/2020    RBCUA None seen 09/21/2020    BLOODU TRACE-INTACT 09/21/2020    SPECGRAV 1.020 09/21/2020    GLUCOSEU Negative 09/21/2020    GLUCOSEU NEGATIVE 03/29/2011       Radiology:  IR PICC WO SQ PORT/PUMP > 5 YEARS   Final Result   Successful placement of PICC line. MRI TIBIA FIBULA LEFT W WO CONTRAST   Final Result   1. Diffuse subcutaneous edema compatible with cellulitis. No abscess or   fasciitis. 2. No acute osseous abnormality.          NM LUNG SCAN PERFUSION ONLY   Final Result   Low probability for pulmonary embolus. VL Extremity Venous Right   Final Result      US RENAL COMPLETE   Final Result   No hydronephrosis noted      Increased echogenicity throughout the liver, suggesting diffuse   hepatocellular disease such as fatty infiltration         CT FEMUR RIGHT WO CONTRAST   Final Result   1. Subcutaneous fat stranding of the right lower extremity compatible with   cellulitis. No drainable fluid collection, soft tissue gas, or evidence of   fasciitis. 2. Small nonspecific right knee effusion with mild medial compartment   degenerative changes. XR TIBIA FIBULA RIGHT (2 VIEWS)   Final Result   No acute fracture or dislocation in the right tibia fibula. No destructive   osseous lesion. Soft tissue swelling. No soft tissue gas or radiopaque foreign body. XR CHEST PORTABLE   Final Result   No acute cardiopulmonary abnormality. Assessment/Plan:    Active Hospital Problems    Diagnosis    History of pulmonary embolism [Z86.711]     Priority: High    Cellulitis of right lower extremity [L03.115]    ULISES (acute kidney injury) ([facility] [address] [N17.9]    Sepsis with acute organ dysfunction ([address] [A41.9, R65.20]    Hyponatremia [E87.1]       Sepsis due to severe right lower extremity cellulitis  - Pt started on rocephin and vancomycin on admission. Dc'd vanc and changed to zyvox due to ULISES on 9/21. Dc'd rocephin and changed to zosyn on 9/22 given fever/worsening leukocytosis. Consulted ID. Added clindamycin and probiotic on 9/22. PICC placed on 9/23.  - MRI obtained and showed diffused subcutaneous edema compatible with cellulitis, no abscess or fasciitis, no acute osseous abnormality.   - Blood cultures are NGTD. Repeat blood cultures are NGTD.   - Continue prn analgesia. - Elevate RLE as tolerated. Acute kidney injury, hyponatremia, metabolic acidosis (resolved)  - Baseline creatinine ~ 1.2, peaked to 3.1.  Currently 1.0.  - Continue to hold his home lisinopril and meloxcam.  - Resolved with IV/bicarb fluids.   - Renal US unremarkable. - Nephrology consulted/following.      H/O PE with significantly elevated d-dimer  - RLE venous doppler negative for DVT. - Perfusion scan negative for PE.   - Heparin gtt dc'd. Constipation (resolved)  - Continue bowel regimen.        DVT Prophylaxis: Heparin SQ  Diet: DIET GENERAL;  Code Status: Full Code    PT/OT Eval Status: ordered     Dispo - uncertain, he will likely be here till next week     [address], APRN - CNP

## 2020-09-25 NOTE — PROGRESS NOTES
Progress Note    HISTORY     CC:   Leg pain               We are following for acute kidney injury      Subjective/   HPI:  BP stable. Scr is 1. Sodium better. Off IV fluids.   Feeling somewhat better     ROS:  Constitutional:  No fevers, No Chills, + weakness  Cardiovascular:  No palpations, + edema on right leg   Respiratory:  No wheezing, no cough  :  No hematuria, no dysuria     Social Hx:  No family at beside    Past Medical and Surgical History:  - Reviewed, no changes     EXAM       Objective/     Vitals:    09/24/20 2259 09/25/20 0357 09/25/20 0818 09/25/20 1343   BP: 136/78 (!) 146/88 (!) 145/88 (!) 144/75   Pulse: 73 64 63 74   Resp: 16 16 18 18   Temp: 98.2 °F (36.8 °C) 97.6 °F (36.4 °C) 97.9 °F (36.6 °C) 97.8 °F (36.6 °C)   TempSrc: Oral Oral Oral Oral   SpO2: 95% 95% 96% 97%   Weight:       Height:         24HR INTAKE/OUTPUT:      Intake/Output Summary (Last 24 hours) at 9/25/2020 1510  Last data filed at 9/25/2020 1344  Gross per 24 hour   Intake 980 ml   Output 1340 ml   Net -360 ml     Constitutional:  Alert, awake, no apparent distress  Eyes:  Pupils reactive, sclera clear   Neck:  Normal thyroid, no masses   Cardiovascular:  Regular, no rub  Respiratory:  No distress, no wheezing  Psychiatry:  Appropriate mood/affect, alert  Abdomen: +bs, soft, nt, no masses   Musculoskeletal: No LE edema, no clubbing   Lymphatics:  No LAD in neck, no supraclavicular nodes       MEDICAL DECISION MAKING       Data/  Recent Labs     09/23/20  0602 09/24/20  0600 09/25/20  0625   WBC 21.9* 17.2* 10.2   HGB 12.0* 11.8* 12.0*   HCT 35.8* 35.3* 34.3*   MCV 87.8 87.4 87.9    236 234     Recent Labs     09/23/20  0602 09/24/20  0600 09/25/20  0625   * 132* 133*   K 3.6 3.7 3.6    96* 97*   CO2 22 28 27   GLUCOSE 108* 113* 120*   PHOS 3.4 3.3 3.2   MG 2.10 2.10 2.00   BUN 33* 26* 21*   CREATININE 1.2 1.1 1.0   LABGLOM >60 >60 >60   GFRAA >60 >60 >60       Assessment/     Acute Kidney Injury:       - Pre-renal        - Resolving     Right LE cellulitis       - Severe       - ID following, seems to be responding to antibiotics     Right LE edema       - doppler negative for DVT    Hypertension:  Range is good    Metabolic Acidosis:         Improved with HCO3 gtt     Hyponatremia       Mild / related to renal failure.   Pain mediated increased ADH        Plan/     Follow labs  Holding fluids and BP medications  Intake appears adequate     -----------------------------  Mathew Dee M.D.   [facility]

## 2020-09-25 NOTE — CARE COORDINATION
Referral received to check IV Benefits. Patients benefit information is as follows: Therapy:Clindamycin 900mg Q8, Zosyn 3.375g Q8  Deductible:$ 1000    Amt Met:$1000  Co-Insurance %:80/20  OOP:$4250      Amt Met:$2906    Pt.  Copay:$14/day for Clindamycin and per edward, $15/day for Zosyn and per edward, $21/visit for nursing    Electronically signed by Luke Wiley on 9/25/2020 at 11:16 AM   Cell Ph# [phone], Office # [phone]

## 2020-09-25 NOTE — ADT AUTH CERT
Cellulitis - Care Day 6 (9/24/2020) by Cecelia Buckley RN         Review Status  Review Entered    Completed  9/25/2020 13:16        Criteria Review       Care Day: 6 Care Date: 9/24/2020 Level of Care:    Guideline Day 3    Clinical Status    ( ) * Hemodynamic stability    9/25/2020 1:16 PM EDT by Kasey Zhu      Na 132  bun 26  wbc  17.2    99.1, 98.3  18  79  154/88  97%ra    (X) * Afebrile or fever improved    ( ) * Skin exam stable or improved    (X) * Mental status at baseline    ( ) * Antibiotic treatment needs appropriate for next level of care    9/25/2020 1:16 PM EDT by Orval Flight setting up to dc home with iv abx    (X) * Pain absent or manageable at next level of care    9/25/2020 1:16 PM EDT by Kasey Zhu      rates pain 10/10,  8/10    ( ) * Discharge plans and education understood    Activity    ( ) * Ambulatory or acceptable for next level of care    Routes    (X) * Oral hydration, medications, [F] and diet    9/25/2020 1:16 PM EDT by Kasey Zhu      symbicort bid,   heparin 5000u sc tid, chronulac 10g tid    roxicodone 10mg is given x2 doses, percocet is given x1    Interventions    (X) WBC    Medications    (X) Parenteral or oral antibiotics [B]    9/25/2020 1:16 PM EDT by Kasey Zhu      Cleocin 900mg iv q8hrs, zyvox 600mg bid, zosyn 3.375 g iv q8hrs    * Milestone    Additional Notes    9/24    Per Hospitalist:    Sepsis due to severe right lower extremity cellulitis    - Pt started on rocephin and vancomycin on admission. Dc'd vanc and changed to zyvox due to ULISES on 9/21. Dc'd rocephin and changed to zosyn on 9/22 given fever/worsening leukocytosis. Consulted ID. Added clindamycin and probiotic on 9/22. PICC placed on 9/23.    - MRI obtained and showed diffused subcutaneous edema compatible with cellulitis, no abscess or fasciitis, no acute osseous abnormality.    - Blood cultures are NGTD. Repeat blood cultures are NGTD.    - Continue prn analgesia. - Elevate RLE as tolerated.      Acute kidney injury, hyponatremia, metabolic acidosis (improving)    - Baseline creatinine ~ 1.2, peaked to 3.1.  Currently 1.1.    - Continue to hold his home lisinopril and meloxcam.    - Improving with IV/bicarb fluids        + edema on right leg

## 2020-09-26 LAB
ALBUMIN SERPL-MCNC: 2 G/DL (ref 3.4–5)
ANION GAP SERPL CALCULATED.3IONS-SCNC: 7 MMOL/L (ref 3–16)
BASOPHILS ABSOLUTE: 0.1 K/UL (ref 0–0.2)
BASOPHILS RELATIVE PERCENT: 0.5 %
BLOOD CULTURE, ROUTINE: NORMAL
BUN BLDV-MCNC: 19 MG/DL (ref 7–20)
CALCIUM SERPL-MCNC: 8 MG/DL (ref 8.3–10.6)
CHLORIDE BLD-SCNC: 100 MMOL/L (ref 99–110)
CO2: 27 MMOL/L (ref 21–32)
CREAT SERPL-MCNC: 1.1 MG/DL (ref 0.8–1.3)
CULTURE, BLOOD 2: NORMAL
EOSINOPHILS ABSOLUTE: 0.2 K/UL (ref 0–0.6)
EOSINOPHILS RELATIVE PERCENT: 1.6 %
GFR AFRICAN AMERICAN: >60
GFR NON-AFRICAN AMERICAN: >60
GLUCOSE BLD-MCNC: 109 MG/DL (ref 70–99)
HCT VFR BLD CALC: 35.2 % (ref 40.5–52.5)
HEMOGLOBIN: 12.1 G/DL (ref 13.5–17.5)
LYMPHOCYTES ABSOLUTE: 1.3 K/UL (ref 1–5.1)
LYMPHOCYTES RELATIVE PERCENT: 12.3 %
MAGNESIUM: 2.1 MG/DL (ref 1.8–2.4)
MCH RBC QN AUTO: 30.2 PG (ref 26–34)
MCHC RBC AUTO-ENTMCNC: 34.5 G/DL (ref 31–36)
MCV RBC AUTO: 87.3 FL (ref 80–100)
MONOCYTES ABSOLUTE: 0.5 K/UL (ref 0–1.3)
MONOCYTES RELATIVE PERCENT: 4.1 %
NEUTROPHILS ABSOLUTE: 8.9 K/UL (ref 1.7–7.7)
NEUTROPHILS RELATIVE PERCENT: 81.5 %
PDW BLD-RTO: 13.6 % (ref 12.4–15.4)
PHOSPHORUS: 3.7 MG/DL (ref 2.5–4.9)
PLATELET # BLD: 290 K/UL (ref 135–450)
PMV BLD AUTO: 7.6 FL (ref 5–10.5)
POTASSIUM SERPL-SCNC: 4.1 MMOL/L (ref 3.5–5.1)
RBC # BLD: 4.03 M/UL (ref 4.2–5.9)
SODIUM BLD-SCNC: 134 MMOL/L (ref 136–145)
WBC # BLD: 10.9 K/UL (ref 4–11)

## 2020-09-26 PROCEDURE — 6360000002 HC RX W HCPCS: Performed by: REGISTERED NURSE

## 2020-09-26 PROCEDURE — 2500000003 HC RX 250 WO HCPCS: Performed by: INTERNAL MEDICINE

## 2020-09-26 PROCEDURE — 1200000000 HC SEMI PRIVATE

## 2020-09-26 PROCEDURE — 2580000003 HC RX 258: Performed by: REGISTERED NURSE

## 2020-09-26 PROCEDURE — 83735 ASSAY OF MAGNESIUM: CPT

## 2020-09-26 PROCEDURE — 85025 COMPLETE CBC W/AUTO DIFF WBC: CPT

## 2020-09-26 PROCEDURE — 6370000000 HC RX 637 (ALT 250 FOR IP): Performed by: REGISTERED NURSE

## 2020-09-26 PROCEDURE — 6370000000 HC RX 637 (ALT 250 FOR IP): Performed by: INTERNAL MEDICINE

## 2020-09-26 PROCEDURE — 94640 AIRWAY INHALATION TREATMENT: CPT

## 2020-09-26 PROCEDURE — 2580000003 HC RX 258: Performed by: INTERNAL MEDICINE

## 2020-09-26 PROCEDURE — 2580000003 HC RX 258

## 2020-09-26 PROCEDURE — 80069 RENAL FUNCTION PANEL: CPT

## 2020-09-26 RX ORDER — SODIUM CHLORIDE 9 MG/ML
INJECTION, SOLUTION INTRAVENOUS
Status: COMPLETED
Start: 2020-09-26 | End: 2020-09-26

## 2020-09-26 RX ORDER — LISINOPRIL 10 MG/1
10 TABLET ORAL DAILY
Status: DISCONTINUED | OUTPATIENT
Start: 2020-09-26 | End: 2020-09-30 | Stop reason: HOSPADM

## 2020-09-26 RX ADMIN — OXYCODONE HYDROCHLORIDE 10 MG: 5 TABLET ORAL at 18:46

## 2020-09-26 RX ADMIN — OXYCODONE HYDROCHLORIDE 10 MG: 5 TABLET ORAL at 23:36

## 2020-09-26 RX ADMIN — LISINOPRIL 10 MG: 10 TABLET ORAL at 13:10

## 2020-09-26 RX ADMIN — Medication 2 PUFF: at 07:50

## 2020-09-26 RX ADMIN — Medication 2 CAPSULE: at 18:47

## 2020-09-26 RX ADMIN — Medication 900 MG: at 06:04

## 2020-09-26 RX ADMIN — Medication 10 ML: at 22:02

## 2020-09-26 RX ADMIN — OXYCODONE HYDROCHLORIDE 10 MG: 5 TABLET ORAL at 03:25

## 2020-09-26 RX ADMIN — SODIUM CHLORIDE 500 ML: 9 INJECTION, SOLUTION INTRAVENOUS at 22:03

## 2020-09-26 RX ADMIN — Medication 2 PUFF: at 20:20

## 2020-09-26 RX ADMIN — Medication 900 MG: at 15:37

## 2020-09-26 RX ADMIN — HEPARIN SODIUM 5000 UNITS: 5000 INJECTION INTRAVENOUS; SUBCUTANEOUS at 13:52

## 2020-09-26 RX ADMIN — OXYCODONE HYDROCHLORIDE 10 MG: 5 TABLET ORAL at 09:37

## 2020-09-26 RX ADMIN — Medication 10 ML: at 09:38

## 2020-09-26 RX ADMIN — OXYCODONE HYDROCHLORIDE 10 MG: 5 TABLET ORAL at 13:51

## 2020-09-26 RX ADMIN — HEPARIN SODIUM 5000 UNITS: 5000 INJECTION INTRAVENOUS; SUBCUTANEOUS at 05:56

## 2020-09-26 RX ADMIN — PIPERACILLIN AND TAZOBACTAM 3.38 G: 3; .375 INJECTION, POWDER, FOR SOLUTION INTRAVENOUS at 18:47

## 2020-09-26 RX ADMIN — Medication 900 MG: at 22:03

## 2020-09-26 RX ADMIN — ALLOPURINOL 300 MG: 300 TABLET ORAL at 09:37

## 2020-09-26 RX ADMIN — Medication 2 CAPSULE: at 09:40

## 2020-09-26 RX ADMIN — PIPERACILLIN AND TAZOBACTAM 3.38 G: 3; .375 INJECTION, POWDER, FOR SOLUTION INTRAVENOUS at 09:37

## 2020-09-26 RX ADMIN — PIPERACILLIN AND TAZOBACTAM 3.38 G: 3; .375 INJECTION, POWDER, FOR SOLUTION INTRAVENOUS at 01:45

## 2020-09-26 RX ADMIN — HEPARIN SODIUM 5000 UNITS: 5000 INJECTION INTRAVENOUS; SUBCUTANEOUS at 22:02

## 2020-09-26 ASSESSMENT — PAIN SCALES - GENERAL
PAINLEVEL_OUTOF10: 7
PAINLEVEL_OUTOF10: 7
PAINLEVEL_OUTOF10: 5
PAINLEVEL_OUTOF10: 8
PAINLEVEL_OUTOF10: 8
PAINLEVEL_OUTOF10: 7

## 2020-09-26 ASSESSMENT — PAIN DESCRIPTION - ORIENTATION: ORIENTATION: RIGHT

## 2020-09-26 ASSESSMENT — PAIN DESCRIPTION - PAIN TYPE: TYPE: ACUTE PAIN

## 2020-09-26 ASSESSMENT — PAIN DESCRIPTION - LOCATION: LOCATION: LEG

## 2020-09-26 NOTE — PROGRESS NOTES
Hospitalist Progress Note      PCP: Jairon Rosa MD    Date of Admission: 9/19/2020    Chief Complaint: Right leg swelling    Hospital Course: Raymon Rowland is a 63 y.o. male. He presents reporting 3 days of increasing right leg redness, swelling, and pain. He began to feel constitutionally  ill with chills and sweats on Wednesday (9/16) and noticed the leg changes developing Thursday. Appetite is strong. Denies nausea and vomiting. He has had loose stools. Patient denies any known RLE wound or trauma. He works as a . Patient has continued to take his prescribed meloxicam     Subjective:   Pt is on RA. Afebrile. VSS. Complains of right leg pain. Medications:  Reviewed    Infusion Medications     Scheduled Medications    lisinopril  10 mg Oral Daily    sodium chloride flush  10 mL Intravenous 2 times per day    polyethylene glycol  17 g Oral Daily    lactulose  10 g Oral TID    piperacillin-tazobactam  3.375 g Intravenous Q8H    heparin (porcine)  5,000 Units Subcutaneous 3 times per day    clindamycin (CLEOCIN) IV  900 mg Intravenous Q8H    lactobacillus  2 capsule Oral BID WC    sennosides-docusate sodium  2 tablet Oral BID    budesonide-formoterol  2 puff Inhalation BID    allopurinol  300 mg Oral Daily     PRN Meds: oxyCODONE **OR** oxyCODONE, morphine, sodium chloride flush, promethazine **OR** ondansetron, acetaminophen, melatonin ER      Intake/Output Summary (Last 24 hours) at 9/26/2020 1336  Last data filed at 9/26/2020 1222  Gross per 24 hour   Intake 840 ml   Output 2525 ml   Net -1685 ml       Physical Exam Performed:    BP (!) 153/85   Pulse 75   Temp 97.8 °F (36.6 °C) (Oral)   Resp 18   Ht 6' (1.829 m)   Wt 220 lb (99.8 kg)   SpO2 97%   BMI 29.84 kg/m²     General appearance: No apparent distress, appears stated age and cooperative. HEENT: Pupils equal, round, and reactive to light. Conjunctivae/corneas clear. Neck: Supple, with full range of motion.  No jugular venous distention. Trachea midline. Respiratory:  Normal respiratory effort. Clear to auscultation, bilaterally without Rales/Wheezes/Rhonchi. Cardiovascular: Regular rate and rhythm with normal S1/S2 without murmurs, rubs or gallops. Abdomen: Soft, non-tender, non-distended with normal bowel sounds. Musculoskeletal: No clubbing, cyanosis. Full range of motion without deformity. Skin: Right lower extremity very swollen, tender to light palpation, blisters to lateral side. Neurologic:  Neurovascularly intact without any focal sensory/motor deficits. Cranial nerves: II-XII intact, grossly non-focal.  Psychiatric: Alert and oriented, thought content appropriate, normal insight  Capillary Refill: Brisk,< 3 seconds   Peripheral Pulses: +2 palpable, equal bilaterally       Labs:   Recent Labs     09/24/20 0600 09/25/20 0625 09/26/20 0600   WBC 17.2* 10.2 10.9   HGB 11.8* 12.0* 12.1*   HCT 35.3* 34.3* 35.2*    234 290     Recent Labs     09/24/20 0600 09/25/20 0625 09/26/20 0600   * 133* 134*   K 3.7 3.6 4.1   CL 96* 97* 100   CO2 28 27 27   BUN 26* 21* 19   CREATININE 1.1 1.0 1.1   CALCIUM 8.0* 8.4 8.0*   PHOS 3.3 3.2 3.7     No results for input(s): AST, ALT, BILIDIR, BILITOT, ALKPHOS in the last 72 hours. No results for input(s): Luisito Clap in the last 72 hours. Urinalysis:      Lab Results   Component Value Date    NITRU Negative 09/21/2020    WBCUA 0-2 09/21/2020    BACTERIA 4+ 09/21/2020    RBCUA None seen 09/21/2020    BLOODU TRACE-INTACT 09/21/2020    SPECGRAV 1.020 09/21/2020    GLUCOSEU Negative 09/21/2020    GLUCOSEU NEGATIVE 03/29/2011       Radiology:  IR PICC WO SQ PORT/PUMP > 5 YEARS   Final Result   Successful placement of PICC line. MRI TIBIA FIBULA LEFT W WO CONTRAST   Final Result   1. Diffuse subcutaneous edema compatible with cellulitis. No abscess or   fasciitis. 2. No acute osseous abnormality.          NM LUNG SCAN PERFUSION ONLY   Final Result   Low probability for pulmonary embolus. VL Extremity Venous Right   Final Result      US RENAL COMPLETE   Final Result   No hydronephrosis noted      Increased echogenicity throughout the liver, suggesting diffuse   hepatocellular disease such as fatty infiltration         CT FEMUR RIGHT WO CONTRAST   Final Result   1. Subcutaneous fat stranding of the right lower extremity compatible with   cellulitis. No drainable fluid collection, soft tissue gas, or evidence of   fasciitis. 2. Small nonspecific right knee effusion with mild medial compartment   degenerative changes. XR TIBIA FIBULA RIGHT (2 VIEWS)   Final Result   No acute fracture or dislocation in the right tibia fibula. No destructive   osseous lesion. Soft tissue swelling. No soft tissue gas or radiopaque foreign body. XR CHEST PORTABLE   Final Result   No acute cardiopulmonary abnormality. Assessment/Plan:    Active Hospital Problems    Diagnosis    History of pulmonary embolism [Z86.711]     Priority: High    Cellulitis of right lower extremity [L03.115]    ULISES (acute kidney injury) ([facility] [address] [N17.9]    Sepsis with acute organ dysfunction ([address] [A41.9, R65.20]    Hyponatremia [E87.1]       Sepsis due to severe right lower extremity cellulitis  - Pt started on rocephin and vancomycin on admission. Dc'd vanc and changed to zyvox due to ULISES on 9/21. Dc'd rocephin and changed to zosyn on 9/22 given fever/worsening leukocytosis. Consulted ID. Added clindamycin and probiotic on 9/22. PICC placed on 9/23. Zyvox dc'd per ID on 9/25.  - MRI obtained and showed diffused subcutaneous edema compatible with cellulitis, no abscess or fasciitis, no acute osseous abnormality.   - Blood cultures are NGTD. Repeat blood cultures are NGTD.   - Continue prn analgesia. - Elevate RLE as tolerated. Acute kidney injury, hyponatremia, metabolic acidosis (resolved)  - Baseline creatinine ~ 1.2, peaked to 3.1.  Currently 1.1.  - Held his home lisinopril and meloxcam. Okay to resume ACE per Nephro. - Resolved with IV/bicarb fluids.   - Renal US unremarkable. - Nephrology consulted/following.      H/O PE with significantly elevated d-dimer  - RLE venous doppler negative for DVT. - Perfusion scan negative for PE.   - Heparin gtt dc'd. Constipation (resolved)  - Continue bowel regimen.        DVT Prophylaxis: Heparin SQ  Diet: DIET GENERAL;  Code Status: Full Code    PT/OT Eval Status: ordered     Dispo - uncertain, he will likely be here till next week     RERE Sullivan - CNP

## 2020-09-26 NOTE — PROGRESS NOTES
Pre-renal        - Resolving     Right LE cellulitis       - Severe       - ID following, seems to be responding to antibiotics     Right LE edema       - doppler negative for DVT    Hypertension:  Range is trending up    Metabolic Acidosis:         Improved with HCO3 gtt     Hyponatremia       Mild / related to renal failure.   Pain mediated increased ADH        Plan/     I think it would be ok to restart lisinopril   Renal to sign off  Thank you for allowing us to participate in the management of your patient during this hospital stay  Would be happy to get back on the case if there is any need    -----------------------------  Samaria Krause M.D.   [facility]

## 2020-09-26 NOTE — PLAN OF CARE
Problem: Falls - Risk of:  Goal: Will remain free from falls  Description: Will remain free from falls  Outcome: Ongoing     Problem: Falls - Risk of:  Goal: Absence of physical injury  Description: Absence of physical injury  Outcome: Ongoing     Problem: Pain:  Goal: Pain level will decrease  Description: Pain level will decrease  Outcome: Ongoing     Problem: Pain:  Goal: Control of acute pain  Description: Control of acute pain  Outcome: Ongoing     Problem: Pain:  Goal: Pain level will decrease  Description: Pain level will decrease  Outcome: Ongoing

## 2020-09-26 NOTE — PLAN OF CARE
Nutrition Problem #1: No nutrition diagnosis at this time  Intervention: Food and/or Nutrient Delivery: Continue Current Diet  Nutritional Goals: Patient will eat 50% or greater of meals.

## 2020-09-26 NOTE — PROGRESS NOTES
Pt assessment completed and charted. VSS. Pt a/o. Pt reports pain 8/10. PRN medication administered per MAR. RLE blistered and weeping. Bed pad change PRN. Bed in lowest position and wheels locked. Call light within reach. Bedside table within reach. Non-skid footwear in place. Pt denies any other needs at this time. Pt calls out appropriately. Will continue to monitor.

## 2020-09-26 NOTE — PROGRESS NOTES
Comprehensive Nutrition Assessment    Type and Reason for Visit:  Initial(LOS)    Nutrition Recommendations/Plan:     Nutrition Assessment:  Patient admitted with sepsis with acute organ dysfunction. Nephrology following for acute renal failure, resolving per progress notes. Na 134 this am, was in the 120's. Currently on a general diet eating % meals. Home care services and possible IV antibiotics per infectious disease appear to be plan for after discharge. Will monitor for any nutritional needs. Appetite stable at this time. Malnutrition Assessment:  Malnutrition Status: At risk for malnutrition (Comment)      Estimated Daily Nutrient Needs:  Energy (kcal):  3680-6110; Weight Used for Energy Requirements:  Ideal     Protein (g):  105-121; Weight Used for Protein Requirements:  Ideal(1.3-1.5)        Fluid (ml/day):  1 kcal/ml; Weight Used for Fluid Requirements:         Nutrition Related Findings:  consistent BM the last 3 days, loose stools prior to admission, appetite stable      Wounds:  (redness on buttocks, redness, blister, swollen on right leg)       Current Nutrition Therapies:    DIET GENERAL; Anthropometric Measures:  · Height: 6' (182.9 cm)  · Current Body Weight: 220 lb (99.8 kg)   · Ideal Body Weight: 178 lbs; % Ideal Body Weight     · BMI: 29.8  · BMI Categories: Overweight (BMI 25.0-29. 9)       Nutrition Diagnosis:   · No nutrition diagnosis at this time related to   as evidenced by      Nutrition Interventions:   Food and/or Nutrient Delivery:  Continue Current Diet  Nutrition Education/Counseling:  No recommendation at this time   Coordination of Nutrition Care:  Continued Inpatient Monitoring    Goals:  Patient will eat 50% or greater of meals.        Nutrition Monitoring and Evaluation:   Behavioral-Environmental Outcomes:      Food/Nutrient Intake Outcomes:  Food and Nutrient Intake  Physical Signs/Symptoms Outcomes:  Biochemical Data, Nutrition Focused Physical Findings Discharge Planning:    Continue current diet     Electronically signed by Jamar Clark RD, LD on 9/26/20 at 9:00 AM EDT    Contact: Office: [phone]; [address] [place] [address] Road: [phone]'

## 2020-09-27 LAB
ALBUMIN SERPL-MCNC: 2.3 G/DL (ref 3.4–5)
ANION GAP SERPL CALCULATED.3IONS-SCNC: 7 MMOL/L (ref 3–16)
BANDED NEUTROPHILS RELATIVE PERCENT: 6 % (ref 0–7)
BASOPHILS ABSOLUTE: 0 K/UL (ref 0–0.2)
BASOPHILS RELATIVE PERCENT: 0 %
BUN BLDV-MCNC: 19 MG/DL (ref 7–20)
CALCIUM SERPL-MCNC: 8.4 MG/DL (ref 8.3–10.6)
CHLORIDE BLD-SCNC: 103 MMOL/L (ref 99–110)
CO2: 25 MMOL/L (ref 21–32)
CREAT SERPL-MCNC: 1.2 MG/DL (ref 0.8–1.3)
DOHLE BODIES: PRESENT
EOSINOPHILS ABSOLUTE: 0.1 K/UL (ref 0–0.6)
EOSINOPHILS RELATIVE PERCENT: 1 %
GFR AFRICAN AMERICAN: >60
GFR NON-AFRICAN AMERICAN: >60
GLUCOSE BLD-MCNC: 107 MG/DL (ref 70–99)
HCT VFR BLD CALC: 36.1 % (ref 40.5–52.5)
HEMOGLOBIN: 12.3 G/DL (ref 13.5–17.5)
LYMPHOCYTES ABSOLUTE: 0.9 K/UL (ref 1–5.1)
LYMPHOCYTES RELATIVE PERCENT: 9 %
MAGNESIUM: 2.1 MG/DL (ref 1.8–2.4)
MCH RBC QN AUTO: 29.7 PG (ref 26–34)
MCHC RBC AUTO-ENTMCNC: 34.1 G/DL (ref 31–36)
MCV RBC AUTO: 87.3 FL (ref 80–100)
METAMYELOCYTES RELATIVE PERCENT: 1 %
MONOCYTES ABSOLUTE: 0.5 K/UL (ref 0–1.3)
MONOCYTES RELATIVE PERCENT: 5 %
MYELOCYTE PERCENT: 2 %
NEUTROPHILS ABSOLUTE: 8.2 K/UL (ref 1.7–7.7)
NEUTROPHILS RELATIVE PERCENT: 76 %
PDW BLD-RTO: 13.8 % (ref 12.4–15.4)
PHOSPHORUS: 4.4 MG/DL (ref 2.5–4.9)
PLATELET # BLD: 335 K/UL (ref 135–450)
PLATELET SLIDE REVIEW: ADEQUATE
PMV BLD AUTO: 7.4 FL (ref 5–10.5)
POLYCHROMASIA: ABNORMAL
POTASSIUM SERPL-SCNC: 4.6 MMOL/L (ref 3.5–5.1)
RBC # BLD: 4.14 M/UL (ref 4.2–5.9)
SLIDE REVIEW: ABNORMAL
SODIUM BLD-SCNC: 135 MMOL/L (ref 136–145)
TOXIC GRANULATION: PRESENT
WBC # BLD: 9.7 K/UL (ref 4–11)

## 2020-09-27 PROCEDURE — 1200000000 HC SEMI PRIVATE

## 2020-09-27 PROCEDURE — 2580000003 HC RX 258: Performed by: INTERNAL MEDICINE

## 2020-09-27 PROCEDURE — 6370000000 HC RX 637 (ALT 250 FOR IP): Performed by: INTERNAL MEDICINE

## 2020-09-27 PROCEDURE — 6370000000 HC RX 637 (ALT 250 FOR IP): Performed by: REGISTERED NURSE

## 2020-09-27 PROCEDURE — 2580000003 HC RX 258: Performed by: REGISTERED NURSE

## 2020-09-27 PROCEDURE — 6360000002 HC RX W HCPCS: Performed by: REGISTERED NURSE

## 2020-09-27 PROCEDURE — 85025 COMPLETE CBC W/AUTO DIFF WBC: CPT

## 2020-09-27 PROCEDURE — 80069 RENAL FUNCTION PANEL: CPT

## 2020-09-27 PROCEDURE — 83735 ASSAY OF MAGNESIUM: CPT

## 2020-09-27 PROCEDURE — 94640 AIRWAY INHALATION TREATMENT: CPT

## 2020-09-27 PROCEDURE — 2500000003 HC RX 250 WO HCPCS: Performed by: INTERNAL MEDICINE

## 2020-09-27 RX ORDER — DOCUSATE SODIUM 100 MG/1
100 CAPSULE, LIQUID FILLED ORAL DAILY
Status: DISCONTINUED | OUTPATIENT
Start: 2020-09-28 | End: 2020-09-30 | Stop reason: HOSPADM

## 2020-09-27 RX ORDER — POLYETHYLENE GLYCOL 3350 17 G/17G
17 POWDER, FOR SOLUTION ORAL DAILY PRN
Status: DISCONTINUED | OUTPATIENT
Start: 2020-09-27 | End: 2020-09-30 | Stop reason: HOSPADM

## 2020-09-27 RX ADMIN — Medication 900 MG: at 22:38

## 2020-09-27 RX ADMIN — ALLOPURINOL 300 MG: 300 TABLET ORAL at 08:45

## 2020-09-27 RX ADMIN — OXYCODONE HYDROCHLORIDE 10 MG: 5 TABLET ORAL at 21:07

## 2020-09-27 RX ADMIN — Medication 900 MG: at 05:44

## 2020-09-27 RX ADMIN — OXYCODONE HYDROCHLORIDE 10 MG: 5 TABLET ORAL at 13:00

## 2020-09-27 RX ADMIN — LISINOPRIL 10 MG: 10 TABLET ORAL at 08:43

## 2020-09-27 RX ADMIN — PIPERACILLIN AND TAZOBACTAM 3.38 G: 3; .375 INJECTION, POWDER, FOR SOLUTION INTRAVENOUS at 08:44

## 2020-09-27 RX ADMIN — Medication 10 ML: at 21:07

## 2020-09-27 RX ADMIN — Medication 2 PUFF: at 20:52

## 2020-09-27 RX ADMIN — Medication 10 ML: at 08:46

## 2020-09-27 RX ADMIN — HEPARIN SODIUM 5000 UNITS: 5000 INJECTION INTRAVENOUS; SUBCUTANEOUS at 05:39

## 2020-09-27 RX ADMIN — Medication 2 PUFF: at 08:51

## 2020-09-27 RX ADMIN — HEPARIN SODIUM 5000 UNITS: 5000 INJECTION INTRAVENOUS; SUBCUTANEOUS at 21:07

## 2020-09-27 RX ADMIN — Medication 2 CAPSULE: at 17:16

## 2020-09-27 RX ADMIN — OXYCODONE HYDROCHLORIDE 10 MG: 5 TABLET ORAL at 17:09

## 2020-09-27 RX ADMIN — OXYCODONE HYDROCHLORIDE 10 MG: 5 TABLET ORAL at 08:42

## 2020-09-27 RX ADMIN — Medication 900 MG: at 15:01

## 2020-09-27 RX ADMIN — PIPERACILLIN AND TAZOBACTAM 3.38 G: 3; .375 INJECTION, POWDER, FOR SOLUTION INTRAVENOUS at 02:10

## 2020-09-27 RX ADMIN — OXYCODONE HYDROCHLORIDE 10 MG: 5 TABLET ORAL at 03:45

## 2020-09-27 RX ADMIN — HEPARIN SODIUM 5000 UNITS: 5000 INJECTION INTRAVENOUS; SUBCUTANEOUS at 15:00

## 2020-09-27 RX ADMIN — Medication 2 CAPSULE: at 08:43

## 2020-09-27 RX ADMIN — PIPERACILLIN AND TAZOBACTAM 3.38 G: 3; .375 INJECTION, POWDER, FOR SOLUTION INTRAVENOUS at 17:16

## 2020-09-27 ASSESSMENT — PAIN DESCRIPTION - LOCATION
LOCATION: LEG
LOCATION: LEG

## 2020-09-27 ASSESSMENT — PAIN SCALES - GENERAL
PAINLEVEL_OUTOF10: 7
PAINLEVEL_OUTOF10: 8
PAINLEVEL_OUTOF10: 7
PAINLEVEL_OUTOF10: 8

## 2020-09-27 ASSESSMENT — PAIN DESCRIPTION - ORIENTATION
ORIENTATION: RIGHT
ORIENTATION: RIGHT

## 2020-09-27 ASSESSMENT — PAIN DESCRIPTION - PAIN TYPE
TYPE: ACUTE PAIN
TYPE: ACUTE PAIN

## 2020-09-27 NOTE — PROGRESS NOTES
Hospitalist Progress Note      PCP: Michelle Farrell MD    Date of Admission: 9/19/2020    Chief Complaint: Right leg swelling    Hospital Course: William Beck is a 63 y.o. male. He presents reporting 3 days of increasing right leg redness, swelling, and pain. He began to feel constitutionally  ill with chills and sweats on Wednesday (9/16) and noticed the leg changes developing Thursday. Appetite is strong. Denies nausea and vomiting. He has had loose stools. Patient denies any known RLE wound or trauma. He works as a . Patient has continued to take his prescribed meloxicam     Subjective:   Pt is on RA. Afebrile. VSS. Right leg swelling/erythema is improving. Medications:  Reviewed    Infusion Medications     Scheduled Medications    [START ON 9/28/2020] docusate sodium  100 mg Oral Daily    lisinopril  10 mg Oral Daily    sodium chloride flush  10 mL Intravenous 2 times per day    piperacillin-tazobactam  3.375 g Intravenous Q8H    heparin (porcine)  5,000 Units Subcutaneous 3 times per day    clindamycin (CLEOCIN) IV  900 mg Intravenous Q8H    lactobacillus  2 capsule Oral BID WC    budesonide-formoterol  2 puff Inhalation BID    allopurinol  300 mg Oral Daily     PRN Meds: polyethylene glycol, oxyCODONE **OR** oxyCODONE, morphine, sodium chloride flush, promethazine **OR** ondansetron, acetaminophen, melatonin ER      Intake/Output Summary (Last 24 hours) at 9/27/2020 1258  Last data filed at 9/27/2020 1258  Gross per 24 hour   Intake 480 ml   Output 3775 ml   Net -3295 ml       Physical Exam Performed:    BP (!) 155/89   Pulse 70   Temp 98 °F (36.7 °C)   Resp 14   Ht 6' (1.829 m)   Wt 220 lb (99.8 kg)   SpO2 98%   BMI 29.84 kg/m²     General appearance: No apparent distress, appears stated age and cooperative. HEENT: Pupils equal, round, and reactive to light. Conjunctivae/corneas clear. Neck: Supple, with full range of motion. No jugular venous distention.  Trachea midline. Respiratory:  Normal respiratory effort. Clear to auscultation, bilaterally without Rales/Wheezes/Rhonchi. Cardiovascular: Regular rate and rhythm with normal S1/S2 without murmurs, rubs or gallops. Abdomen: Soft, non-tender, non-distended with normal bowel sounds. Musculoskeletal: No clubbing, cyanosis. Full range of motion without deformity. Skin: Right lower extremity very swollen, tender to light palpation, blisters to lateral side. Neurologic:  Neurovascularly intact without any focal sensory/motor deficits. Cranial nerves: II-XII intact, grossly non-focal.  Psychiatric: Alert and oriented, thought content appropriate, normal insight  Capillary Refill: Brisk,< 3 seconds   Peripheral Pulses: +2 palpable, equal bilaterally       Labs:   Recent Labs     09/25/20  0625 09/26/20  0600 09/27/20  0542   WBC 10.2 10.9 9.7   HGB 12.0* 12.1* 12.3*   HCT 34.3* 35.2* 36.1*    290 335     Recent Labs     09/25/20 0625 09/26/20  0600 09/27/20  0542   * 134* 135*   K 3.6 4.1 4.6   CL 97* 100 103   CO2 27 27 25   BUN 21* 19 19   CREATININE 1.0 1.1 1.2   CALCIUM 8.4 8.0* 8.4   PHOS 3.2 3.7 4.4     Urinalysis:      Lab Results   Component Value Date    NITRU Negative 09/21/2020    WBCUA 0-2 09/21/2020    BACTERIA 4+ 09/21/2020    RBCUA None seen 09/21/2020    BLOODU TRACE-INTACT 09/21/2020    SPECGRAV 1.020 09/21/2020    GLUCOSEU Negative 09/21/2020    GLUCOSEU NEGATIVE 03/29/2011       Radiology:  IR PICC WO SQ PORT/PUMP > 5 YEARS   Final Result   Successful placement of PICC line. MRI TIBIA FIBULA LEFT W WO CONTRAST   Final Result   1. Diffuse subcutaneous edema compatible with cellulitis. No abscess or   fasciitis. 2. No acute osseous abnormality. NM LUNG SCAN PERFUSION ONLY   Final Result   Low probability for pulmonary embolus.          VL Extremity Venous Right   Final Result      US RENAL COMPLETE   Final Result   No hydronephrosis noted      Increased echogenicity consulted/following. Now signed off.      H/O PE with significantly elevated d-dimer  - RLE venous doppler negative for DVT. - Perfusion scan negative for PE.   - Heparin gtt dc'd. Constipation (resolved)  - Continue bowel regimen.        DVT Prophylaxis: Heparin SQ  Diet: DIET GENERAL;  Code Status: Full Code    PT/OT Eval Status: ordered, recs pending     Dispo - 2-3 days pending ID recs    Dawn Merlin Cordial, APRN - CNP

## 2020-09-28 LAB
ALBUMIN SERPL-MCNC: 2.6 G/DL (ref 3.4–5)
ANION GAP SERPL CALCULATED.3IONS-SCNC: 9 MMOL/L (ref 3–16)
BANDED NEUTROPHILS RELATIVE PERCENT: 1 % (ref 0–7)
BASOPHILS ABSOLUTE: 0 K/UL (ref 0–0.2)
BASOPHILS RELATIVE PERCENT: 0 %
BUN BLDV-MCNC: 18 MG/DL (ref 7–20)
CALCIUM SERPL-MCNC: 9 MG/DL (ref 8.3–10.6)
CHLORIDE BLD-SCNC: 102 MMOL/L (ref 99–110)
CO2: 23 MMOL/L (ref 21–32)
CREAT SERPL-MCNC: 1.1 MG/DL (ref 0.8–1.3)
EOSINOPHILS ABSOLUTE: 0.1 K/UL (ref 0–0.6)
EOSINOPHILS RELATIVE PERCENT: 1 %
GFR AFRICAN AMERICAN: >60
GFR NON-AFRICAN AMERICAN: >60
GLUCOSE BLD-MCNC: 108 MG/DL (ref 70–99)
HCT VFR BLD CALC: 36.8 % (ref 40.5–52.5)
HEMOGLOBIN: 12.5 G/DL (ref 13.5–17.5)
LYMPHOCYTES ABSOLUTE: 1.3 K/UL (ref 1–5.1)
LYMPHOCYTES RELATIVE PERCENT: 14 %
MAGNESIUM: 2.1 MG/DL (ref 1.8–2.4)
MCH RBC QN AUTO: 29.8 PG (ref 26–34)
MCHC RBC AUTO-ENTMCNC: 34.1 G/DL (ref 31–36)
MCV RBC AUTO: 87.5 FL (ref 80–100)
MONOCYTES ABSOLUTE: 0.2 K/UL (ref 0–1.3)
MONOCYTES RELATIVE PERCENT: 2 %
NEUTROPHILS ABSOLUTE: 8 K/UL (ref 1.7–7.7)
NEUTROPHILS RELATIVE PERCENT: 82 %
PDW BLD-RTO: 14 % (ref 12.4–15.4)
PHOSPHORUS: 3.9 MG/DL (ref 2.5–4.9)
PLATELET # BLD: 354 K/UL (ref 135–450)
PLATELET SLIDE REVIEW: ADEQUATE
PMV BLD AUTO: 7.2 FL (ref 5–10.5)
POTASSIUM SERPL-SCNC: 4.7 MMOL/L (ref 3.5–5.1)
RBC # BLD: 4.21 M/UL (ref 4.2–5.9)
RBC # BLD: NORMAL 10*6/UL
SLIDE REVIEW: ABNORMAL
SODIUM BLD-SCNC: 134 MMOL/L (ref 136–145)
TOXIC GRANULATION: PRESENT
WBC # BLD: 9.6 K/UL (ref 4–11)

## 2020-09-28 PROCEDURE — 94640 AIRWAY INHALATION TREATMENT: CPT

## 2020-09-28 PROCEDURE — 99232 SBSQ HOSP IP/OBS MODERATE 35: CPT | Performed by: INTERNAL MEDICINE

## 2020-09-28 PROCEDURE — 85025 COMPLETE CBC W/AUTO DIFF WBC: CPT

## 2020-09-28 PROCEDURE — 6360000002 HC RX W HCPCS: Performed by: REGISTERED NURSE

## 2020-09-28 PROCEDURE — 83735 ASSAY OF MAGNESIUM: CPT

## 2020-09-28 PROCEDURE — 2500000003 HC RX 250 WO HCPCS: Performed by: INTERNAL MEDICINE

## 2020-09-28 PROCEDURE — 6370000000 HC RX 637 (ALT 250 FOR IP): Performed by: INTERNAL MEDICINE

## 2020-09-28 PROCEDURE — 97530 THERAPEUTIC ACTIVITIES: CPT

## 2020-09-28 PROCEDURE — 97535 SELF CARE MNGMENT TRAINING: CPT

## 2020-09-28 PROCEDURE — 2580000003 HC RX 258: Performed by: INTERNAL MEDICINE

## 2020-09-28 PROCEDURE — 1200000000 HC SEMI PRIVATE

## 2020-09-28 PROCEDURE — 80069 RENAL FUNCTION PANEL: CPT

## 2020-09-28 PROCEDURE — 6370000000 HC RX 637 (ALT 250 FOR IP): Performed by: REGISTERED NURSE

## 2020-09-28 PROCEDURE — 2580000003 HC RX 258: Performed by: REGISTERED NURSE

## 2020-09-28 RX ADMIN — OXYCODONE HYDROCHLORIDE 10 MG: 5 TABLET ORAL at 13:46

## 2020-09-28 RX ADMIN — Medication 2 PUFF: at 20:20

## 2020-09-28 RX ADMIN — DOCUSATE SODIUM 100 MG: 100 CAPSULE, LIQUID FILLED ORAL at 09:06

## 2020-09-28 RX ADMIN — PIPERACILLIN AND TAZOBACTAM 3.38 G: 3; .375 INJECTION, POWDER, FOR SOLUTION INTRAVENOUS at 01:12

## 2020-09-28 RX ADMIN — Medication 2 CAPSULE: at 09:05

## 2020-09-28 RX ADMIN — OXYCODONE HYDROCHLORIDE 10 MG: 5 TABLET ORAL at 09:04

## 2020-09-28 RX ADMIN — HEPARIN SODIUM 5000 UNITS: 5000 INJECTION INTRAVENOUS; SUBCUTANEOUS at 13:41

## 2020-09-28 RX ADMIN — PIPERACILLIN AND TAZOBACTAM 3.38 G: 3; .375 INJECTION, POWDER, FOR SOLUTION INTRAVENOUS at 16:59

## 2020-09-28 RX ADMIN — Medication 2 CAPSULE: at 16:59

## 2020-09-28 RX ADMIN — OXYCODONE HYDROCHLORIDE 10 MG: 5 TABLET ORAL at 18:28

## 2020-09-28 RX ADMIN — HEPARIN SODIUM 5000 UNITS: 5000 INJECTION INTRAVENOUS; SUBCUTANEOUS at 06:05

## 2020-09-28 RX ADMIN — HEPARIN SODIUM 5000 UNITS: 5000 INJECTION INTRAVENOUS; SUBCUTANEOUS at 22:44

## 2020-09-28 RX ADMIN — OXYCODONE HYDROCHLORIDE 10 MG: 5 TABLET ORAL at 05:22

## 2020-09-28 RX ADMIN — Medication 900 MG: at 13:34

## 2020-09-28 RX ADMIN — Medication 900 MG: at 22:45

## 2020-09-28 RX ADMIN — LISINOPRIL 10 MG: 10 TABLET ORAL at 09:07

## 2020-09-28 RX ADMIN — Medication 900 MG: at 06:12

## 2020-09-28 RX ADMIN — OXYCODONE HYDROCHLORIDE 10 MG: 5 TABLET ORAL at 01:13

## 2020-09-28 RX ADMIN — ALLOPURINOL 300 MG: 300 TABLET ORAL at 09:07

## 2020-09-28 RX ADMIN — PIPERACILLIN AND TAZOBACTAM 3.38 G: 3; .375 INJECTION, POWDER, FOR SOLUTION INTRAVENOUS at 09:04

## 2020-09-28 RX ADMIN — Medication 2 PUFF: at 08:22

## 2020-09-28 RX ADMIN — OXYCODONE HYDROCHLORIDE 10 MG: 5 TABLET ORAL at 22:57

## 2020-09-28 ASSESSMENT — PAIN SCALES - GENERAL
PAINLEVEL_OUTOF10: 7
PAINLEVEL_OUTOF10: 6
PAINLEVEL_OUTOF10: 7
PAINLEVEL_OUTOF10: 7
PAINLEVEL_OUTOF10: 6
PAINLEVEL_OUTOF10: 7
PAINLEVEL_OUTOF10: 8
PAINLEVEL_OUTOF10: 7
PAINLEVEL_OUTOF10: 7

## 2020-09-28 ASSESSMENT — PAIN DESCRIPTION - LOCATION
LOCATION: LEG
LOCATION: LEG

## 2020-09-28 ASSESSMENT — PAIN DESCRIPTION - ORIENTATION
ORIENTATION: RIGHT
ORIENTATION: RIGHT

## 2020-09-28 ASSESSMENT — PAIN DESCRIPTION - PAIN TYPE: TYPE: ACUTE PAIN

## 2020-09-28 NOTE — PROGRESS NOTES
Occupational Therapy  Facility/Department: [facility] C5 - MED SURG/ORTHO  Daily Treatment Note  NAME: Lakisha Skinner  : 1955  MRN: [id number]    Date of Service: 2020    Discharge Recommendations:  Continue to assess pending progress       Assessment   Performance deficits / Impairments: Decreased functional mobility ; Decreased ADL status  Assessment: Pt was seen this date and requested bed pan. Pt offered the [facility] and to get to the toilet, pt declined. Pt was given the bed pan to use. Pt Max A for hygiene and donning socks. Pt required assistance to perform a stand step transfer from the bed to chair. Pt R LE was proped on a chiar to decrease the pain per the pt request.  Prognosis: Good  Activity Tolerance  Activity Tolerance: Patient limited by pain  Safety Devices  Safety Devices in place: Yes  Type of devices: Gait belt;Call light within reach; Left in chair;Chair alarm in place;Nurse notified         Patient Diagnosis(es): The primary encounter diagnosis was Cellulitis of right lower extremity. Diagnoses of Septicemia ([facility] [address] and Acute renal failure, unspecified acute renal failure type [facility]) were also pertinent to this visit. has a past medical history of Asthma, Enlarged prostate, Gout, HH (hiatus hernia), Hypertension, LVH (left ventricular hypertrophy), Pulmonary embolus ([address] Rosacea, Schatzki's ring, Toenail fungus, Tremor, essential, and Wears glasses. has a past surgical history that includes Esophagus dilation; Shoulder arthroscopy (3/11 ); Upper gastrointestinal endoscopy (2010); Colonoscopy (2010); Intracapsular cataract extraction (Left, 2020); and Intracapsular cataract extraction (Right, 2020).     Restrictions  Restrictions/Precautions  Restrictions/Precautions: Up as Tolerated     Subjective   General  Chart Reviewed: Yes, Progress Notes  Patient assessed for rehabilitation services?: Yes  Response to previous treatment: Patient with no complaints from previous

## 2020-09-28 NOTE — CARE COORDINATION
Chart review day 9:  Pt followed by IM and ID w/PT/OT; Pt will need long term IVABX and both [facility] and [facility] are agreeable to take pt when he is medically stable. PT/OT on board and continuing to assess for safety needs as pt progresses.   Bishop Ortega RN

## 2020-09-28 NOTE — PROGRESS NOTES
Hospitalist Progress Note      PCP: Ancelmo Gunter MD    Date of Admission: 9/19/2020    Chief Complaint: Right leg swelling    Hospital Course: Regan Coronado is a 63 y.o. male. He presents reporting 3 days of increasing right leg redness, swelling, and pain. He began to feel constitutionally  ill with chills and sweats on Wednesday (9/16) and noticed the leg changes developing Thursday. Appetite is strong. Denies nausea and vomiting. He has had loose stools. Patient denies any known RLE wound or trauma. He works as a . Patient has continued to take his prescribed meloxicam     Subjective:     Pt is on RA. Afebrile. VSS. Right leg swelling/erythema is improving. +activity intolerance    Medications:  Reviewed    Infusion Medications     Scheduled Medications    docusate sodium  100 mg Oral Daily    lisinopril  10 mg Oral Daily    sodium chloride flush  10 mL Intravenous 2 times per day    piperacillin-tazobactam  3.375 g Intravenous Q8H    heparin (porcine)  5,000 Units Subcutaneous 3 times per day    clindamycin (CLEOCIN) IV  900 mg Intravenous Q8H    lactobacillus  2 capsule Oral BID WC    budesonide-formoterol  2 puff Inhalation BID    allopurinol  300 mg Oral Daily     PRN Meds: polyethylene glycol, oxyCODONE **OR** oxyCODONE, morphine, sodium chloride flush, promethazine **OR** ondansetron, acetaminophen, melatonin ER      Intake/Output Summary (Last 24 hours) at 9/28/2020 0856  Last data filed at 9/28/2020 0309  Gross per 24 hour   Intake 240 ml   Output 4375 ml   Net -4135 ml       Physical Exam Performed:    BP (!) 143/84   Pulse 59   Temp 97.8 °F (36.6 °C) (Oral)   Resp 16   Ht 6' (1.829 m)   Wt 220 lb (99.8 kg)   SpO2 96%   BMI 29.84 kg/m²     General appearance: No apparent distress, appears stated age and cooperative. HEENT: Pupils equal, round, and reactive to light. Conjunctivae/corneas clear. Neck: Supple, with full range of motion.  No jugular venous distention. Trachea midline. Respiratory:  Normal respiratory effort. Clear to auscultation, bilaterally without Rales/Wheezes/Rhonchi. Cardiovascular: Regular rate and rhythm with normal S1/S2 without murmurs, rubs or gallops. Abdomen: Soft, non-tender, non-distended with normal bowel sounds. Musculoskeletal: No clubbing, cyanosis. Full range of motion without deformity. Skin: Right lower extremity very swollen, tender to light palpation, blisters to lateral side. Neurologic:  Neurovascularly intact without any focal sensory/motor deficits. Cranial nerves: II-XII intact, grossly non-focal.  Psychiatric: Alert and oriented, thought content appropriate, normal insight  Capillary Refill: Brisk,< 3 seconds   Peripheral Pulses: +2 palpable, equal bilaterally       Labs:   Recent Labs     09/26/20  0600 09/27/20  0542 09/28/20  0605   WBC 10.9 9.7 9.6   HGB 12.1* 12.3* 12.5*   HCT 35.2* 36.1* 36.8*    335 354     Recent Labs     09/26/20  0600 09/27/20  0542 09/28/20  0605   * 135* 134*   K 4.1 4.6 4.7    103 102   CO2 27 25 23   BUN 19 19 18   CREATININE 1.1 1.2 1.1   CALCIUM 8.0* 8.4 9.0   PHOS 3.7 4.4 3.9     Urinalysis:      Lab Results   Component Value Date    NITRU Negative 09/21/2020    WBCUA 0-2 09/21/2020    BACTERIA 4+ 09/21/2020    RBCUA None seen 09/21/2020    BLOODU TRACE-INTACT 09/21/2020    SPECGRAV 1.020 09/21/2020    GLUCOSEU Negative 09/21/2020    GLUCOSEU NEGATIVE 03/29/2011       Radiology:  IR PICC WO SQ PORT/PUMP > 5 YEARS   Final Result   Successful placement of PICC line. MRI TIBIA FIBULA LEFT W WO CONTRAST   Final Result   1. Diffuse subcutaneous edema compatible with cellulitis. No abscess or   fasciitis. 2. No acute osseous abnormality. NM LUNG SCAN PERFUSION ONLY   Final Result   Low probability for pulmonary embolus.          VL Extremity Venous Right   Final Result      US RENAL COMPLETE   Final Result   No hydronephrosis noted      Increased echogenicity throughout the liver, suggesting diffuse   hepatocellular disease such as fatty infiltration         CT FEMUR RIGHT WO CONTRAST   Final Result   1. Subcutaneous fat stranding of the right lower extremity compatible with   cellulitis. No drainable fluid collection, soft tissue gas, or evidence of   fasciitis. 2. Small nonspecific right knee effusion with mild medial compartment   degenerative changes. XR TIBIA FIBULA RIGHT (2 VIEWS)   Final Result   No acute fracture or dislocation in the right tibia fibula. No destructive   osseous lesion. Soft tissue swelling. No soft tissue gas or radiopaque foreign body. XR CHEST PORTABLE   Final Result   No acute cardiopulmonary abnormality. Assessment/Plan:    Active Hospital Problems    Diagnosis    History of pulmonary embolism [Z86.711]     Priority: High    Cellulitis of right lower extremity [L03.115]    ULISES (acute kidney injury) ([address] [address] [N17.9]    Sepsis with acute organ dysfunction ([address] [A41.9, R65.20]    Hyponatremia [E87.1]       Sepsis due to severe right lower extremity cellulitis (clinically improving)  - Pt started on rocephin and vancomycin on admission. Dc'd vanc and changed to zyvox due to ULISES on 9/21. Dc'd rocephin and changed to zosyn on 9/22 given fever/worsening leukocytosis. Consulted ID. Added clindamycin and probiotic on 9/22. PICC placed on 9/23. Zyvox dc'd per ID on 9/25.  - MRI obtained and showed diffused subcutaneous edema compatible with cellulitis, no abscess or fasciitis, no acute osseous abnormality.   - Blood cultures are NGTD. Repeat blood cultures are NGTD.   - Continue prn analgesia. - Elevate RLE as tolerated. Acute kidney injury, hyponatremia, metabolic acidosis (resolved)  - Baseline creatinine ~ 1.2, peaked to 3.1. Currently 1.2.  - Held his home lisinopril and meloxcam. Okay to resume ACE per Nephro. - Resolved with IV/bicarb fluids.   - Renal US unremarkable.   - Nephrology consulted/following. Now signed off.      H/O PE with significantly elevated d-dimer  - RLE venous doppler negative for DVT. - Perfusion scan negative for PE.   - Heparin gtt dc'd. Constipation (resolved)  - Continue bowel regimen.        DVT Prophylaxis: Heparin SQ  Diet: DIET GENERAL;  Code Status: Full Code    PT/OT Eval Status: ordered, recs pending     Dispo - 2-3 days pending ID recs    Lakisha Saucedo, APRN - CNP

## 2020-09-28 NOTE — PROGRESS NOTES
Physical Therapy  Facility/Department: [facility] C5 - MED SURG/ORTHO  Daily Treatment Note  NAME: Gualberto Wiley  : 1955  MRN: [id number]    Date of Service: 2020    Discharge Recommendations:  Continue to assess pending progress, Subacute/Skilled Nursing Facility        Assessment   Body structures, Functions, Activity limitations: Decreased functional mobility ; Decreased ROM; Decreased strength;Decreased balance; Increased pain  Assessment: Pt able to get OOB to chair despite pain levels being very high and pt visibly in a sweat from the pain/activity. Pt tolerated 45 minutes. Will con't to progress ex to include ex plus OOB as pt tolerance improves. Anticipate pt will may need SNF.if he does not have A at home. Treatment Diagnosis: Decreased functional mobility, increased pain  Prognosis: Good  Decision Making: Medium Complexity  Barriers to Learning: none  REQUIRES PT FOLLOW UP: Yes  Activity Tolerance  Activity Tolerance: Patient Tolerated treatment well;Patient limited by pain  Activity Tolerance: pt tolerated ~ 45 minutes OOB today per nursing report     Patient Diagnosis(es): The primary encounter diagnosis was Cellulitis of right lower extremity. Diagnoses of Septicemia ([facility] [address] and Acute renal failure, unspecified acute renal failure type [facility]) were also pertinent to this visit. has a past medical history of Asthma, Enlarged prostate, Gout, HH (hiatus hernia), Hypertension, LVH (left ventricular hypertrophy), Pulmonary embolus ([address] Rosacea, Schatzki's ring, Toenail fungus, Tremor, essential, and Wears glasses. has a past surgical history that includes Esophagus dilation; Shoulder arthroscopy (3/11 ); Upper gastrointestinal endoscopy (2010); Colonoscopy (2010); Intracapsular cataract extraction (Left, 2020); and Intracapsular cataract extraction (Right, 2020).     Restrictions  Restrictions/Precautions  Restrictions/Precautions: Up as Tolerated  Subjective   General  Chart Reviewed: Yes  Response To Previous Treatment: Patient with no complaints from previous session. Family / Caregiver Present: No  Referring Practitioner: RERE Arambula CNP  Subjective  Subjective: Pt agreeable to therapy, though very painful. Pain Screening  Patient Currently in Pain: Yes  Pain Assessment  Pain Assessment: 0-10  Pain Level: 7  Pain Type: Acute pain  Pain Location: Leg  Pain Orientation: Right  Non-Pharmaceutical Pain Intervention(s): Emotional support;Elevation  Vital Signs  Patient Currently in Pain: Yes       Orientation  Orientation  Overall Orientation Status: Within Functional Limits  Cognition      Objective   Bed mobility  Rolling to Left: Contact guard assistance  Rolling to Right: Contact guard assistance(for bed pain, pt adamently refused OOB to [facility] or toilet to have BM.)  Supine to Sit: Minimal assistance  Sit to Supine: Unable to assess  Transfers  Sit to Stand: Minimal Assistance  Stand to sit: Minimal Assistance  Bed to Chair: Minimal assistance(with SW, pt hopping to avoid wt  on RLE)  Ambulation  Ambulation?: Yes  Ambulation 1  Surface: level tile  Device: Standard Walker  Assistance: Minimal assistance  Distance: 4 steps bed to chair  Comments: increased pain with leg in dependent position        Exercises  Comments: defer, pt in too much pain once up. He will try to tolerate 1 hr up in chair. He is hot and sweaty, vitals are all WNL. Importance of being OOB explained to pt, pt has h/o PE. AM-PAC Score  AM-PAC Inpatient Mobility Raw Score : 14 (09/28/20 1503)  AM-PAC Inpatient T-Scale Score : 38.1 (09/28/20 1503)  Mobility Inpatient CMS 0-100% Score: 61.29 (09/28/20 1503)  Mobility Inpatient CMS G-Code Modifier : CL (09/28/20 1503)          Goals  Short term goals  Time Frame for Short term goals: 5-7 days unless otherwise stated, by 10/2/20  Short term goal 1: Pt will complete bed mobility with independence.   Short term goal 2: Pt will complete sit<>stand with independence. Short term goal 3: Pt will ambulate 25 feet with LRAD and SBA. Short term goal 4: Pt will ascend/descend 12 steps with LRAD and SBA. Patient Goals   Patient goals : To go home    Plan    Plan  Times per week: 3-5x  Times per day: Daily  Current Treatment Recommendations: Strengthening, ROM, Balance Training, Functional Mobility Training, Transfer Training, Stair training, Gait Training, Endurance Training, Pain Management, Home Exercise Program, Safety Education & Training, Patient/Caregiver Education & Training  Safety Devices  Type of devices:  All fall risk precautions in place, Call light within reach, Chair alarm in place, Gait belt, Left in bed, Nurse notified     Therapy Time   Individual Concurrent Group Co-treatment   Time In 0945         Time Out 1026         Minutes [address], [place] [address]

## 2020-09-28 NOTE — PROGRESS NOTES
Infectious Disease Follow up Notes    CC :  RLE cellulitis      Antibiotics:   clinda 900 IV q8  Zosyn 3.375 q8    Admit Date:   9/19/2020  Hospital Day: 10    Subjective:   He remains afebrile   He thinks he is doing much better. Tolerating activity very poorly. No apparent side effects from the abx     Objective:     Patient Vitals for the past 8 hrs:   BP Temp Temp src Pulse Resp SpO2   09/28/20 1427 130/79 98.2 °F (36.8 °C) Oral 76 16 95 %   09/28/20 1203 (!) 142/81 97.8 °F (36.6 °C) -- 61 -- 91 %   09/28/20 0743 (!) 143/84 97.8 °F (36.6 °C) Oral 59 16 96 %       EXAM:  General:  Alert, oriented, NAD     HEENT:  NCAT, PERRL, sclera anicteric  NECK:  supple  LUNGS:   CTA seferino    CV:  RRR   ABD: Soft, flat, NT     EXT: Marked improvement in the R leg - erythema faded.   Petechiae more prominent large bullae, proximal to knee unroofed         LINE:  PICC placed 9/23/20 - site ok         Scheduled Meds:   docusate sodium  100 mg Oral Daily    lisinopril  10 mg Oral Daily    sodium chloride flush  10 mL Intravenous 2 times per day    piperacillin-tazobactam  3.375 g Intravenous Q8H    heparin (porcine)  5,000 Units Subcutaneous 3 times per day    clindamycin (CLEOCIN) IV  900 mg Intravenous Q8H    lactobacillus  2 capsule Oral BID WC    budesonide-formoterol  2 puff Inhalation BID    allopurinol  300 mg Oral Daily         Data Review:    Lab Results   Component Value Date    WBC 9.6 09/28/2020    HGB 12.5 (L) 09/28/2020    HCT 36.8 (L) 09/28/2020    MCV 87.5 09/28/2020     09/28/2020     Lab Results   Component Value Date    CREATININE 1.1 09/28/2020    BUN 18 09/28/2020     (L) 09/28/2020    K 4.7 09/28/2020     09/28/2020    CO2 23 09/28/2020       Hepatic Function Panel:   Lab Results   Component Value Date    ALKPHOS 139 09/23/2020    ALT 32 09/23/2020    AST 43 09/23/2020    PROT 5.0 09/23/2020    PROT

## 2020-09-29 LAB
ALBUMIN SERPL-MCNC: 2.7 G/DL (ref 3.4–5)
ANION GAP SERPL CALCULATED.3IONS-SCNC: 10 MMOL/L (ref 3–16)
BASOPHILS ABSOLUTE: 0.1 K/UL (ref 0–0.2)
BASOPHILS RELATIVE PERCENT: 0.6 %
BUN BLDV-MCNC: 20 MG/DL (ref 7–20)
CALCIUM SERPL-MCNC: 9 MG/DL (ref 8.3–10.6)
CHLORIDE BLD-SCNC: 101 MMOL/L (ref 99–110)
CO2: 24 MMOL/L (ref 21–32)
CREAT SERPL-MCNC: 1.2 MG/DL (ref 0.8–1.3)
EOSINOPHILS ABSOLUTE: 0.2 K/UL (ref 0–0.6)
EOSINOPHILS RELATIVE PERCENT: 1.5 %
GFR AFRICAN AMERICAN: >60
GFR NON-AFRICAN AMERICAN: >60
GLUCOSE BLD-MCNC: 112 MG/DL (ref 70–99)
HCT VFR BLD CALC: 38.9 % (ref 40.5–52.5)
HEMOGLOBIN: 12.9 G/DL (ref 13.5–17.5)
LYMPHOCYTES ABSOLUTE: 1.5 K/UL (ref 1–5.1)
LYMPHOCYTES RELATIVE PERCENT: 13.3 %
MAGNESIUM: 2 MG/DL (ref 1.8–2.4)
MCH RBC QN AUTO: 29.2 PG (ref 26–34)
MCHC RBC AUTO-ENTMCNC: 33.1 G/DL (ref 31–36)
MCV RBC AUTO: 88.3 FL (ref 80–100)
MONOCYTES ABSOLUTE: 0.6 K/UL (ref 0–1.3)
MONOCYTES RELATIVE PERCENT: 5 %
NEUTROPHILS ABSOLUTE: 9.1 K/UL (ref 1.7–7.7)
NEUTROPHILS RELATIVE PERCENT: 79.6 %
PDW BLD-RTO: 14 % (ref 12.4–15.4)
PHOSPHORUS: 3.9 MG/DL (ref 2.5–4.9)
PLATELET # BLD: 448 K/UL (ref 135–450)
PMV BLD AUTO: 7.2 FL (ref 5–10.5)
POTASSIUM SERPL-SCNC: 4.8 MMOL/L (ref 3.5–5.1)
RBC # BLD: 4.41 M/UL (ref 4.2–5.9)
SODIUM BLD-SCNC: 135 MMOL/L (ref 136–145)
WBC # BLD: 11.4 K/UL (ref 4–11)

## 2020-09-29 PROCEDURE — 80069 RENAL FUNCTION PANEL: CPT

## 2020-09-29 PROCEDURE — 6370000000 HC RX 637 (ALT 250 FOR IP): Performed by: INTERNAL MEDICINE

## 2020-09-29 PROCEDURE — 2580000003 HC RX 258: Performed by: INTERNAL MEDICINE

## 2020-09-29 PROCEDURE — 94640 AIRWAY INHALATION TREATMENT: CPT

## 2020-09-29 PROCEDURE — 83735 ASSAY OF MAGNESIUM: CPT

## 2020-09-29 PROCEDURE — 97110 THERAPEUTIC EXERCISES: CPT

## 2020-09-29 PROCEDURE — 1200000000 HC SEMI PRIVATE

## 2020-09-29 PROCEDURE — 6370000000 HC RX 637 (ALT 250 FOR IP): Performed by: REGISTERED NURSE

## 2020-09-29 PROCEDURE — 2580000003 HC RX 258: Performed by: REGISTERED NURSE

## 2020-09-29 PROCEDURE — 85025 COMPLETE CBC W/AUTO DIFF WBC: CPT

## 2020-09-29 PROCEDURE — 97116 GAIT TRAINING THERAPY: CPT

## 2020-09-29 PROCEDURE — 2500000003 HC RX 250 WO HCPCS: Performed by: INTERNAL MEDICINE

## 2020-09-29 PROCEDURE — 6360000002 HC RX W HCPCS: Performed by: REGISTERED NURSE

## 2020-09-29 RX ADMIN — HEPARIN SODIUM 5000 UNITS: 5000 INJECTION INTRAVENOUS; SUBCUTANEOUS at 23:13

## 2020-09-29 RX ADMIN — Medication 2 PUFF: at 07:58

## 2020-09-29 RX ADMIN — Medication 900 MG: at 14:50

## 2020-09-29 RX ADMIN — Medication 900 MG: at 23:13

## 2020-09-29 RX ADMIN — OXYCODONE HYDROCHLORIDE 10 MG: 5 TABLET ORAL at 23:18

## 2020-09-29 RX ADMIN — OXYCODONE HYDROCHLORIDE 10 MG: 5 TABLET ORAL at 18:41

## 2020-09-29 RX ADMIN — LISINOPRIL 10 MG: 10 TABLET ORAL at 10:17

## 2020-09-29 RX ADMIN — OXYCODONE HYDROCHLORIDE 10 MG: 5 TABLET ORAL at 10:16

## 2020-09-29 RX ADMIN — Medication 2 CAPSULE: at 17:40

## 2020-09-29 RX ADMIN — OXYCODONE HYDROCHLORIDE 10 MG: 5 TABLET ORAL at 05:49

## 2020-09-29 RX ADMIN — PIPERACILLIN AND TAZOBACTAM 3.38 G: 3; .375 INJECTION, POWDER, FOR SOLUTION INTRAVENOUS at 17:27

## 2020-09-29 RX ADMIN — HEPARIN SODIUM 5000 UNITS: 5000 INJECTION INTRAVENOUS; SUBCUTANEOUS at 05:50

## 2020-09-29 RX ADMIN — OXYCODONE HYDROCHLORIDE 10 MG: 5 TABLET ORAL at 14:38

## 2020-09-29 RX ADMIN — Medication 10 ML: at 10:18

## 2020-09-29 RX ADMIN — PIPERACILLIN AND TAZOBACTAM 3.38 G: 3; .375 INJECTION, POWDER, FOR SOLUTION INTRAVENOUS at 10:18

## 2020-09-29 RX ADMIN — ALLOPURINOL 300 MG: 300 TABLET ORAL at 10:17

## 2020-09-29 RX ADMIN — Medication 2 PUFF: at 21:28

## 2020-09-29 RX ADMIN — HEPARIN SODIUM 5000 UNITS: 5000 INJECTION INTRAVENOUS; SUBCUTANEOUS at 14:37

## 2020-09-29 RX ADMIN — Medication 2 CAPSULE: at 10:17

## 2020-09-29 RX ADMIN — PIPERACILLIN AND TAZOBACTAM 3.38 G: 3; .375 INJECTION, POWDER, FOR SOLUTION INTRAVENOUS at 01:45

## 2020-09-29 RX ADMIN — Medication 900 MG: at 06:14

## 2020-09-29 ASSESSMENT — PAIN SCALES - GENERAL
PAINLEVEL_OUTOF10: 0
PAINLEVEL_OUTOF10: 9
PAINLEVEL_OUTOF10: 7
PAINLEVEL_OUTOF10: 8
PAINLEVEL_OUTOF10: 7
PAINLEVEL_OUTOF10: 7
PAINLEVEL_OUTOF10: 0
PAINLEVEL_OUTOF10: 5
PAINLEVEL_OUTOF10: 7

## 2020-09-29 ASSESSMENT — PAIN DESCRIPTION - LOCATION
LOCATION: LEG
LOCATION: LEG

## 2020-09-29 ASSESSMENT — PAIN DESCRIPTION - ORIENTATION
ORIENTATION: RIGHT
ORIENTATION: RIGHT

## 2020-09-29 ASSESSMENT — PAIN DESCRIPTION - PAIN TYPE
TYPE: ACUTE PAIN
TYPE: ACUTE PAIN

## 2020-09-29 NOTE — CARE COORDINATION
Chart review day 10:  Pt followed by ID and IM w/wound care and PT/OT on board; Pt is participating poorly in PT r/t pain. Pt will likely need IV ABX; [facility] and [facility] following;  DCP will folllow and assess for possible SNF placement.   Silvio Clemens RN

## 2020-09-29 NOTE — ADT AUTH CERT
Cellulitis - Care Day 9 (9/27/2020) by Reyna Ryan RN         Review Status  Review Entered    Completed  9/28/2020 16:55        Criteria Review       Care Day: 9 Care Date: 9/27/2020 Level of Care:    Guideline Day 3    Clinical Status    ( ) * Hemodynamic stability    9/28/2020 4:55 PM EDT by Aruna Anaya      163/92  58  29  96%ra    (X) * Afebrile or fever improved    9/28/2020 4:55 PM EDT by Aruna Anaya      98.5    ( ) * Skin exam stable or improved    (X) * Mental status at baseline    ( ) * Antibiotic treatment needs appropriate for next level of care    9/28/2020 4:55 PM EDT by Aruna Anaya      await ID recommendation    (X) * Pain absent or manageable at next level of care    ( ) * Discharge plans and education understood    Activity    ( ) * Ambulatory or acceptable for next level of care    Routes    (X) * Oral hydration, medications, [F] and diet    9/28/2020 4:55 PM EDT by Aruna Anaya      symbicort bid  roxicodone 10mg given x5 doses    Interventions    (X) WBC    9/28/2020 4:55 PM EDT by Aruna Anaya      9.7    Medications    (X) Parenteral or oral antibiotics [B]    9/28/2020 4:55 PM EDT by Aruna Anaya      cleocin 900mg iv q8hrs  zosyn 3.375 g iv q8hrs    * Milestone    Additional Notes    9/27    Right leg swelling/erythema is improving.     Per ID:    Continue Zosyn, clinda through the weekend         DC linezolid         Expect slow improvement         May or may not need IV abx after DC, will see him again on Monday to determine        Cellulitis - Care Day 7 (9/25/2020) by Reyna Ryan RN         Review Status  Review Entered    Completed  9/28/2020 11:30        Criteria Review       Care Day: 7 Care Date: 9/25/2020 Level of Care:    Guideline Day 3    Clinical Status    ( ) * Hemodynamic stability    9/28/2020 11:30 AM EDT by Aruna Anaya      80  168/88  96%ra  18    rates pain 8/10    Na  133    (X) * Afebrile or fever improved    9/28/2020 11:30 AM EDT by Vini Ferguson 98.0

## 2020-09-29 NOTE — PROGRESS NOTES
Hospitalist Progress Note      PCP: Tim Niño MD    Date of Admission: 9/19/2020    Chief Complaint: Right leg swelling    Hospital Course: Phi Faust is a 63 y.o. male. He presents reporting 3 days of increasing right leg redness, swelling, and pain. He began to feel constitutionally  ill with chills and sweats on Wednesday (9/16) and noticed the leg changes developing Thursday. Appetite is strong. Denies nausea and vomiting. He has had loose stools. Patient denies any known RLE wound or trauma. He works as a . Patient has continued to take his prescribed meloxicam     Subjective:     No sig changes. Medications:  Reviewed    Infusion Medications     Scheduled Medications    docusate sodium  100 mg Oral Daily    lisinopril  10 mg Oral Daily    sodium chloride flush  10 mL Intravenous 2 times per day    piperacillin-tazobactam  3.375 g Intravenous Q8H    heparin (porcine)  5,000 Units Subcutaneous 3 times per day    clindamycin (CLEOCIN) IV  900 mg Intravenous Q8H    lactobacillus  2 capsule Oral BID WC    budesonide-formoterol  2 puff Inhalation BID    allopurinol  300 mg Oral Daily     PRN Meds: polyethylene glycol, oxyCODONE **OR** oxyCODONE, morphine, sodium chloride flush, promethazine **OR** ondansetron, acetaminophen, melatonin ER      Intake/Output Summary (Last 24 hours) at 9/29/2020 1109  Last data filed at 9/29/2020 0814  Gross per 24 hour   Intake 240 ml   Output 1975 ml   Net -1735 ml       Physical Exam Performed:    /79   Pulse 73   Temp 97.3 °F (36.3 °C) (Oral)   Resp 12   Ht 6' (1.829 m)   Wt 220 lb (99.8 kg)   SpO2 96%   BMI 29.84 kg/m²     General appearance: No apparent distress, appears stated age and cooperative. HEENT: Pupils equal, round, and reactive to light. Conjunctivae/corneas clear. Neck: Supple, with full range of motion. No jugular venous distention. Trachea midline. Respiratory:  Normal respiratory effort.  Clear to auscultation, bilaterally without Rales/Wheezes/Rhonchi. Cardiovascular: Regular rate and rhythm with normal S1/S2 without murmurs, rubs or gallops. Abdomen: Soft, non-tender, non-distended with normal bowel sounds. Musculoskeletal: No clubbing, cyanosis. Full range of motion without deformity. Skin: Right lower extremity very swollen, tender to light palpation, blisters to lateral side. Neurologic:  Neurovascularly intact without any focal sensory/motor deficits. Cranial nerves: II-XII intact, grossly non-focal.  Psychiatric: Alert and oriented, thought content appropriate, normal insight  Capillary Refill: Brisk,< 3 seconds   Peripheral Pulses: +2 palpable, equal bilaterally       Labs:   Recent Labs     09/27/20  0542 09/28/20  0605 09/29/20  0600   WBC 9.7 9.6 11.4*   HGB 12.3* 12.5* 12.9*   HCT 36.1* 36.8* 38.9*    354 448     Recent Labs     09/27/20  0542 09/28/20  0605 09/29/20  0600   * 134* 135*   K 4.6 4.7 4.8    102 101   CO2 25 23 24   BUN 19 18 20   CREATININE 1.2 1.1 1.2   CALCIUM 8.4 9.0 9.0   PHOS 4.4 3.9 3.9     Urinalysis:      Lab Results   Component Value Date    NITRU Negative 09/21/2020    WBCUA 0-2 09/21/2020    BACTERIA 4+ 09/21/2020    RBCUA None seen 09/21/2020    BLOODU TRACE-INTACT 09/21/2020    SPECGRAV 1.020 09/21/2020    GLUCOSEU Negative 09/21/2020    GLUCOSEU NEGATIVE 03/29/2011       Radiology:  IR PICC WO SQ PORT/PUMP > 5 YEARS   Final Result   Successful placement of PICC line. MRI TIBIA FIBULA LEFT W WO CONTRAST   Final Result   1. Diffuse subcutaneous edema compatible with cellulitis. No abscess or   fasciitis. 2. No acute osseous abnormality. NM LUNG SCAN PERFUSION ONLY   Final Result   Low probability for pulmonary embolus.          VL Extremity Venous Right   Final Result      US RENAL COMPLETE   Final Result   No hydronephrosis noted      Increased echogenicity throughout the liver, suggesting diffuse   hepatocellular disease d-dimer  - RLE venous doppler negative for DVT. - Perfusion scan negative for PE.   - Heparin gtt dc'd. Constipation (resolved)  - Continue bowel regimen.        DVT Prophylaxis: Heparin SQ  Diet: DIET GENERAL;  Code Status: Full Code    PT/OT Eval Status: ordered, recs pending     Dispo -pending recs, will touch base with ID    Estefany Stahl APRN - CNP

## 2020-09-29 NOTE — PROGRESS NOTES
Reviewed: Yes  Response To Previous Treatment: Patient with no complaints from previous session. Family / Caregiver Present: No  Referring Practitioner: RERE Tran CNP  Subjective  Subjective: Pt agreeable to therapy, states painful, 7/10 pain scale, but not as bad as yesterday. General Comment  Comments: Blaise Fischer RN cleared pt for session  Pain Screening  Patient Currently in Pain: Yes  Pain Assessment  Pain Assessment: 0-10  Pain Level: 7  Non-Pharmaceutical Pain Intervention(s): Ambulation/Increased Activity;Repositioned  Vital Signs  Patient Currently in Pain: Yes       Orientation  Orientation  Overall Orientation Status: Within Normal Limits  Cognition      Objective   Bed mobility  Supine to Sit: Stand by assistance  Sit to Supine: Stand by assistance  Transfers  Sit to Stand: Contact guard assistance  Stand to sit: Contact guard assistance  Ambulation  Ambulation?: Yes  More Ambulation?: No(Pt c/o fatigue/pain)  Ambulation 1  Surface: level tile  Device: Rolling Walker  Assistance: Contact guard assistance  Quality of Gait: decr WB RLE, steady  Gait Deviations: Slow Dianne;Decreased step length;Decreased step height  Distance: 15 ft     Balance  Posture: Fair  Sitting - Static: Good  Sitting - Dynamic: Good  Standing - Static: Fair;+  Standing - Dynamic: Fair;+  Comments: with RW  Exercises  Straight Leg Raise: 5 B  Heelslides: 10 B  Hip Flexion: 10 B  Hip Abduction: 5 B  Ankle Pumps: 10 B             AM-PAC Score  AM-PAC Inpatient Mobility Raw Score : 17 (09/29/20 1511)  AM-PAC Inpatient T-Scale Score : 42.13 (09/29/20 1511)  Mobility Inpatient CMS 0-100% Score: 50.57 (09/29/20 1511)  Mobility Inpatient CMS G-Code Modifier : CK (09/29/20 1511)          Goals  Short term goals  Time Frame for Short term goals: 5-7 days unless otherwise stated, by 10/2/20  Short term goal 1: Pt will complete bed mobility with independence.  --9/29 sba  Short term goal 2: Pt will complete sit<>stand with independence. --9/29 cga  Short term goal 3: Pt will ambulate 25 feet with LRAD and SBA. --9/29 cga rw 15 ft  Short term goal 4: Pt will ascend/descend 12 steps with LRAD and SBA. --9/29 N/T  Patient Goals   Patient goals : To go home    Plan    Plan  Times per week: 3-5x  Times per day: Daily  Current Treatment Recommendations: Strengthening, ROM, Balance Training, Functional Mobility Training, Transfer Training, Stair training, Gait Training, Endurance Training, Pain Management, Home Exercise Program, Safety Education & Training, Patient/Caregiver Education & Training  Safety Devices  Type of devices:  All fall risk precautions in place, Call light within reach, Gait belt, Nurse notified, Bed alarm in place, Left in bed     Therapy Time   Individual Concurrent Group Co-treatment   Time In 1415         Time Out 1440         Minutes 25         Timed Code Treatment Minutes: 1440 [facility]

## 2020-09-30 VITALS
DIASTOLIC BLOOD PRESSURE: 81 MMHG | OXYGEN SATURATION: 96 % | HEIGHT: 72 IN | BODY MASS INDEX: 29.8 KG/M2 | WEIGHT: 220 LBS | RESPIRATION RATE: 17 BRPM | HEART RATE: 76 BPM | TEMPERATURE: 98 F | SYSTOLIC BLOOD PRESSURE: 139 MMHG

## 2020-09-30 LAB
ALBUMIN SERPL-MCNC: 3 G/DL (ref 3.4–5)
ANION GAP SERPL CALCULATED.3IONS-SCNC: 10 MMOL/L (ref 3–16)
BASOPHILS ABSOLUTE: 0.1 K/UL (ref 0–0.2)
BASOPHILS RELATIVE PERCENT: 0.7 %
BUN BLDV-MCNC: 19 MG/DL (ref 7–20)
CALCIUM SERPL-MCNC: 9.7 MG/DL (ref 8.3–10.6)
CHLORIDE BLD-SCNC: 100 MMOL/L (ref 99–110)
CO2: 23 MMOL/L (ref 21–32)
CREAT SERPL-MCNC: 1.1 MG/DL (ref 0.8–1.3)
EOSINOPHILS ABSOLUTE: 0.1 K/UL (ref 0–0.6)
EOSINOPHILS RELATIVE PERCENT: 1.5 %
GFR AFRICAN AMERICAN: >60
GFR NON-AFRICAN AMERICAN: >60
GLUCOSE BLD-MCNC: 109 MG/DL (ref 70–99)
HCT VFR BLD CALC: 37.5 % (ref 40.5–52.5)
HEMOGLOBIN: 12.8 G/DL (ref 13.5–17.5)
LYMPHOCYTES ABSOLUTE: 1.7 K/UL (ref 1–5.1)
LYMPHOCYTES RELATIVE PERCENT: 19.7 %
MAGNESIUM: 2.1 MG/DL (ref 1.8–2.4)
MCH RBC QN AUTO: 29.8 PG (ref 26–34)
MCHC RBC AUTO-ENTMCNC: 34.1 G/DL (ref 31–36)
MCV RBC AUTO: 87.3 FL (ref 80–100)
MONOCYTES ABSOLUTE: 0.5 K/UL (ref 0–1.3)
MONOCYTES RELATIVE PERCENT: 5.2 %
NEUTROPHILS ABSOLUTE: 6.4 K/UL (ref 1.7–7.7)
NEUTROPHILS RELATIVE PERCENT: 72.9 %
PDW BLD-RTO: 14 % (ref 12.4–15.4)
PHOSPHORUS: 4.1 MG/DL (ref 2.5–4.9)
PLATELET # BLD: 432 K/UL (ref 135–450)
PMV BLD AUTO: 6.9 FL (ref 5–10.5)
POTASSIUM SERPL-SCNC: 4.7 MMOL/L (ref 3.5–5.1)
RBC # BLD: 4.3 M/UL (ref 4.2–5.9)
SODIUM BLD-SCNC: 133 MMOL/L (ref 136–145)
WBC # BLD: 8.8 K/UL (ref 4–11)

## 2020-09-30 PROCEDURE — 99232 SBSQ HOSP IP/OBS MODERATE 35: CPT | Performed by: INTERNAL MEDICINE

## 2020-09-30 PROCEDURE — 6370000000 HC RX 637 (ALT 250 FOR IP): Performed by: INTERNAL MEDICINE

## 2020-09-30 PROCEDURE — 85025 COMPLETE CBC W/AUTO DIFF WBC: CPT

## 2020-09-30 PROCEDURE — 94640 AIRWAY INHALATION TREATMENT: CPT

## 2020-09-30 PROCEDURE — 6370000000 HC RX 637 (ALT 250 FOR IP): Performed by: REGISTERED NURSE

## 2020-09-30 PROCEDURE — 2580000003 HC RX 258: Performed by: REGISTERED NURSE

## 2020-09-30 PROCEDURE — 83735 ASSAY OF MAGNESIUM: CPT

## 2020-09-30 PROCEDURE — 6360000002 HC RX W HCPCS: Performed by: REGISTERED NURSE

## 2020-09-30 PROCEDURE — 2500000003 HC RX 250 WO HCPCS: Performed by: INTERNAL MEDICINE

## 2020-09-30 PROCEDURE — 80069 RENAL FUNCTION PANEL: CPT

## 2020-09-30 RX ORDER — LACTOBACILLUS RHAMNOSUS GG 10B CELL
2 CAPSULE ORAL 2 TIMES DAILY WITH MEALS
Qty: 60 CAPSULE | Refills: 0 | Status: SHIPPED | OUTPATIENT
Start: 2020-09-30 | End: 2020-10-05 | Stop reason: SDUPTHER

## 2020-09-30 RX ORDER — OXYCODONE HYDROCHLORIDE 5 MG/1
5 TABLET ORAL EVERY 6 HOURS PRN
Qty: 20 TABLET | Refills: 0 | Status: SHIPPED | OUTPATIENT
Start: 2020-09-30 | End: 2020-10-05

## 2020-09-30 RX ORDER — CLINDAMYCIN HYDROCHLORIDE 150 MG/1
300 CAPSULE ORAL 4 TIMES DAILY
Status: DISCONTINUED | OUTPATIENT
Start: 2020-09-30 | End: 2020-09-30 | Stop reason: HOSPADM

## 2020-09-30 RX ORDER — LISINOPRIL 10 MG/1
10 TABLET ORAL DAILY
Qty: 30 TABLET | Refills: 3 | Status: SHIPPED | OUTPATIENT
Start: 2020-10-01

## 2020-09-30 RX ORDER — CLINDAMYCIN HYDROCHLORIDE 300 MG/1
300 CAPSULE ORAL 4 TIMES DAILY
Qty: 28 CAPSULE | Refills: 0 | Status: SHIPPED | OUTPATIENT
Start: 2020-09-30 | End: 2020-10-05 | Stop reason: SDUPTHER

## 2020-09-30 RX ADMIN — Medication 2 PUFF: at 08:47

## 2020-09-30 RX ADMIN — CLINDAMYCIN HYDROCHLORIDE 300 MG: 150 CAPSULE ORAL at 13:32

## 2020-09-30 RX ADMIN — Medication 900 MG: at 06:24

## 2020-09-30 RX ADMIN — OXYCODONE 5 MG: 5 TABLET ORAL at 13:46

## 2020-09-30 RX ADMIN — LISINOPRIL 10 MG: 10 TABLET ORAL at 09:19

## 2020-09-30 RX ADMIN — OXYCODONE HYDROCHLORIDE 10 MG: 5 TABLET ORAL at 05:53

## 2020-09-30 RX ADMIN — CLINDAMYCIN HYDROCHLORIDE 300 MG: 150 CAPSULE ORAL at 16:54

## 2020-09-30 RX ADMIN — HEPARIN SODIUM 5000 UNITS: 5000 INJECTION INTRAVENOUS; SUBCUTANEOUS at 14:02

## 2020-09-30 RX ADMIN — PIPERACILLIN AND TAZOBACTAM 3.38 G: 3; .375 INJECTION, POWDER, FOR SOLUTION INTRAVENOUS at 02:02

## 2020-09-30 RX ADMIN — ALLOPURINOL 300 MG: 300 TABLET ORAL at 09:19

## 2020-09-30 RX ADMIN — Medication 2 CAPSULE: at 09:19

## 2020-09-30 RX ADMIN — Medication 2 CAPSULE: at 16:54

## 2020-09-30 RX ADMIN — HEPARIN SODIUM 5000 UNITS: 5000 INJECTION INTRAVENOUS; SUBCUTANEOUS at 05:35

## 2020-09-30 ASSESSMENT — PAIN DESCRIPTION - ORIENTATION
ORIENTATION: RIGHT

## 2020-09-30 ASSESSMENT — PAIN SCALES - GENERAL
PAINLEVEL_OUTOF10: 7
PAINLEVEL_OUTOF10: 0
PAINLEVEL_OUTOF10: 0
PAINLEVEL_OUTOF10: 7
PAINLEVEL_OUTOF10: 7
PAINLEVEL_OUTOF10: 0
PAINLEVEL_OUTOF10: 7

## 2020-09-30 ASSESSMENT — PAIN DESCRIPTION - LOCATION
LOCATION: LEG

## 2020-09-30 ASSESSMENT — PAIN DESCRIPTION - PAIN TYPE
TYPE: ACUTE PAIN

## 2020-09-30 NOTE — PROGRESS NOTES
AVS reviewed and signed with patient. Report given to [facility] Patient Transport. Patient going home to family.

## 2020-09-30 NOTE — DISCHARGE INSTR - COC
Clean;Dry; Intact 20   Dressing Changed Changed/New 20 09   Dressing/Treatment Foam 20   Wound Cleansed Rinsed/Irrigated with saline 20   Drainage Amount None 20   Odor None 20   Red%Wound Bed 80 20 0911   Yellow%Wound Bed 20 20 0911   Number of days: 10        Elimination:  Continence:   · Bowel: {YES / JX:55637}  · Bladder: {YES / SA:66801}  Urinary Catheter: {Urinary Catheter:492664412:::0}   Colostomy/Ileostomy/Ileal Conduit: {YES / MQ:59645}       Date of Last BM: ***    Intake/Output Summary (Last 24 hours) at 2020 1031  Last data filed at 2020 8810  Gross per 24 hour   Intake --   Output 3275 ml   Net -3275 ml     I/O last 3 completed shifts:  In: -   Out: 2153 [Urine:3075]    Safety Concerns:     [address] [employer] Safety Concerns:813040934:::0}    Impairments/Disabilities:      [address] [employer] Impairments/Disabilities:669238379:::0}    Nutrition Therapy:  Current Nutrition Therapy:   [employer] Diet List:176624410:::0}    Routes of Feeding: {CHP DME Other Feedings:750453703:::0}  Liquids: {Slp liquid thickness:90927}  Daily Fluid Restriction: {CHP DME Yes amt example:628142035:::0}  Last Modified Barium Swallow with Video (Video Swallowing Test): {Done Not Done [facility]:780448966:::9}    Treatments at the Time of Hospital Discharge:   Respiratory Treatments: ***  Oxygen Therapy:  {Therapy; copd oxygen:44772:::0}  Ventilator:    { CC Vent List:745174586:::0}    Rehab Therapies: MSW, Physical Therapy, Occupational Therapy and Nurse  Weight Bearing Status/Restrictions: [employer] Weight Bearin:::0}  Other Medical Equipment (for information only, NOT a DME order):  {EQUIPMENT:450298542}  Other Treatments: HOME HEALTH CARE: LEVEL 3 [address] to establish plan of care for patient over 60 day period   Nursing  Initial home SN evaluation visit to occur within 24-48 hours for:  1)  medication management  2)  VS and clinical

## 2020-09-30 NOTE — PROGRESS NOTES
NGTD  9/22     BC x2 NGTD         Radiology Review:  All pertinent images / reports were reviewed as a part of this visit. CT R femur 9/19/20   Impression    1. Subcutaneous fat stranding of the right lower extremity compatible with    cellulitis.  No drainable fluid collection, soft tissue gas, or evidence of    fasciitis. 2. Small nonspecific right knee effusion with mild medial compartment    degenerative changes.       Doppler 9/21/20   No evidence of deep vein or superficial thrombosis involving the right lower     extremity and the contralateral proximal common femoral vein.       MRI RLE 9/22/20   Impression    1. Diffuse subcutaneous edema compatible with cellulitis.  No abscess or    fasciitis.     2. No acute osseous abnormality.           Assessment:     Patient Active Problem List    Diagnosis Date Noted    History of pulmonary embolism      Priority: High    Hyperlipemia 03/31/2011     Priority: High    Pulmonary embolism ([facility] [address] 03/30/2011     Priority: High     Overview Note:     Replacing Inactive Diagnoses      Gout 09/01/2014     Priority: Medium    Asthma      Priority: Medium    Tremor, essential      Priority: Medium    Schatzki's ring      Priority: Medium    Toenail fungus      Priority: Low    LVH (left ventricular hypertrophy)      Priority: Low    HH (hiatus hernia)      Priority: Low    Rosacea      Priority: Low    Pneumonia 03/31/2011     Priority: Low    Dyspnea 03/31/2011     Priority: Low    Constipation 03/31/2011     Priority: Low    S/P shoulder surgery 03/31/2011     Priority: Low     Overview Note:     Right      Cellulitis of right lower extremity 09/19/2020    ULISES (acute kidney injury) ([facility] [address] 09/19/2020    Sepsis with acute organ dysfunction ([facility] [address] 09/19/2020    Hyponatremia 09/19/2020    Pain in limb 07/20/2015    Finger pain 07/13/2015       Severe RLE cellulitis  No guiding micro data  BC and ASO/streptozyme negative  No evidence deep seeded infection by MRI/CT     Leukocytosis - resolved    ULISES - resolving     No abx allergies    Plan:   Doing much better  Local infection largely resolved    Change to po clinda po x7 days    He will follow-up with me in 2 weeks, call with concerns     If he goes home may benefit from home care for wound care    Please call with further questions  D/w RN and Rakesh Lorenzo MD  Phone: [phone]   Fax : [phone]

## 2020-09-30 NOTE — CARE COORDINATION
CASE MANAGEMENT DISCHARGE SUMMARY      Discharge to: home w/[facility]    Transportation:  private       Ambulance form completed: Yes    Confirmed discharge plan with:     Patient: yes     Family, name and contact number:Pt a/o, able to contact family   Facility/Agency, name:  [facility] faxed to [facility]        RN, name: Sharyn Khan RN    Pt will d/c w/[facility] for PT/OT and RN for wound care.   Toño Salinas RN

## 2020-09-30 NOTE — DISCHARGE SUMMARY
Hospital Medicine Discharge Summary    Patient: Zaria Quinones     Gender: male  : 1955   Age: 63 y.o. MRN: [id number]    Admitting Physician: Rakesh Juan MD  Discharge Physician: RERE Sigala CNP     Code Status: Full Code     Admit Date: 2020   Discharge Date:   2020    Disposition:  Home    Discharge Diagnoses: Active Hospital Problems    Diagnosis Date Noted    History of pulmonary embolism [Z86.711]      Priority: High    Cellulitis of right lower extremity [L03.115] 2020    ULISES (acute kidney injury) ([facility] [address] [N17.9] 2020    Sepsis with acute organ dysfunction ([facility] [address] [A41.9, R65.20] 2020    Hyponatremia [E87.1] 2020       Follow-up appointments:  one week    Outpatient to do list: PCP in 1 week. Infectious disease, Dr Bertrand Dejesus, 2 weeks    Condition at Discharge:  Stable    Hospital Course:   Mary Jo Aguilar a 63 y. o. male.  He presents reporting 3 days of increasing right leg redness, swelling, and pain. He began to feel constitutionally  ill with chills and sweats on Wednesday () and noticed the leg changes developing Thursday. Denied nausea and vomiting. He has had loose stools. Patient denied any known RLE wound or trauma. He works as a . Sepsis due to severe right lower extremity cellulitis (clinically improving)  - Pt started on rocephin and vancomycin on admission. Dc'd vanc and changed to zyvox due to ULISES on . Dc'd rocephin and changed to zosyn on  given fever/worsening leukocytosis. Consulted ID. Added clindamycin and probiotic on . PICC placed on . Zyvox dc'd per ID on .  - MRI obtained and showed diffused subcutaneous edema compatible with cellulitis, no abscess or fasciitis, no acute osseous abnormality.   - Blood cultures are NGTD. Repeat blood cultures are NGTD.   - Continue prn analgesia. - Elevate RLE as tolerated.    - home with HC, wound care     Acute kidney injury, hyponatremia, metabolic acidosis (resolved)  - Baseline creatinine ~ 1.2, peaked to 3.1. Currently 1.2.  - Held his home lisinopril and meloxcam. Okay to resume ACE per Nephro. - Resolved with IV/bicarb fluids.   - Renal US unremarkable. - Nephrology consulted/following. Now signed off.      H/O PE with significantly elevated d-dimer  - RLE venous doppler negative for DVT. - Perfusion scan negative for PE.   - Heparin gtt dc'd.      Constipation (resolved)  - Continue bowel regimen. Discharge Medications:   Current Discharge Medication List      START taking these medications    Details   oxyCODONE (ROXICODONE) 5 MG immediate release tablet Take 1 tablet by mouth every 6 hours as needed for Pain for up to 5 days. Qty: 20 tablet, Refills: 0    Comments: Reduce doses taken as pain becomes manageable  Associated Diagnoses: Cellulitis of right lower extremity      lactobacillus (CULTURELLE) capsule Take 2 capsules by mouth 2 times daily (with meals)  Qty: 60 capsule, Refills: 0      clindamycin (CLEOCIN) 300 MG capsule Take 1 capsule by mouth 4 times daily for 7 days  Qty: 28 capsule, Refills: 0           Current Discharge Medication List      CONTINUE these medications which have CHANGED    Details   lisinopril (PRINIVIL;ZESTRIL) 10 MG tablet Take 1 tablet by mouth daily  Qty: 30 tablet, Refills: 3           Current Discharge Medication List      CONTINUE these medications which have NOT CHANGED    Details   tamsulosin (FLOMAX) 0.4 MG capsule Take 0.4 mg by mouth nightly      meloxicam (MOBIC) 15 MG tablet TAKE 1 TABLET BY MOUTH ONE TIME A DAY   Qty: 90 tablet, Refills: 0    Associated Diagnoses: Tear of medial meniscus of right knee, current, unspecified tear type, initial encounter      ADVAIR DISKUS 100-50 MCG/DOSE diskus inhaler INHALE 1 DOSE BY MOUTH TWICE DAILY. RINSE MOUTH AFTER USE  Qty: 1 Inhaler, Refills: 1      allopurinol (ZYLOPRIM) 300 MG tablet 1/2 pill a day. For gout prevention.   Qty: 15 tablet, Refills: 6    Associated Diagnoses: Gout           Current Discharge Medication List          Discharge ROS:  Pertinent positives as noted dc summary. All other systems reviewed and negative. Discharge Exam:    /77   Pulse 75   Temp 97.9 °F (36.6 °C) (Oral)   Resp 16   Ht 6' (1.829 m)   Wt 220 lb (99.8 kg)   SpO2 95%   BMI 29.84 kg/m²   General appearance:  NAD  HEENT:   Normal cephalic, atraumatic, moist mucous membranes, no oropharyngeal erythema or exudate  Neck: Supple, trachea midline, no anterior cervical or SC LAD  Heart[de-identified] Normal s1/s2, RRR, no murmurs, gallops, or rubs. Mild R leg edema  Lungs:  clear bilaterally, no wheeze, no rales or rhonchi, no use of accessory musclesNormal respiratory effort. Clear to auscultation, bilaterally without Rales/Wheezes/Rhonchi. Abdomen: Soft, non-tender, non-distended, bowel sounds present, no masses  Musculoskeletal:  No clubbing, no cyanosis, RLE edema  Skin:erythema minimal distally, prominent petechiae bullae unchanged  Serous drainage evident           Neurologic:  Neurovascularly intact without any focal sensory/motor deficits. Cranial nerves: II-XII intact, grossly non-focal.  Psychiatric:  A & O x3  Neuro: Grossly intact, moves all four extremities     Labs:  For convenience and continuity at follow-up the following most recent labs are provided:    Lab Results   Component Value Date    WBC 8.8 09/30/2020    HGB 12.8 09/30/2020    HCT 37.5 09/30/2020    MCV 87.3 09/30/2020     09/30/2020     09/30/2020    K 4.7 09/30/2020    K 3.8 09/19/2020     09/30/2020    CO2 23 09/30/2020    BUN 19 09/30/2020    CREATININE 1.1 09/30/2020    CALCIUM 9.7 09/30/2020    PHOS 4.1 09/30/2020    ALKPHOS 139 09/23/2020    ALT 32 09/23/2020    AST 43 09/23/2020    BILITOT 1.4 09/23/2020    LABALBU 3.0 09/30/2020    LDLCALC 123 06/07/2014    TRIG 177 06/07/2014     Lab Results   Component Value Date    INR 1.39 (H) 09/19/2020    INR 1.88 (H) 03/31/2011    INR 1.21 (H) 03/30/2011       Radiology:  Xr Tibia Fibula Right (2 Views)    Result Date: 9/19/2020  EXAMINATION: XRAY VIEWS OF THE RIGHT TIBIA AND FIBULA 9/19/2020 7:26 pm COMPARISON: None. HISTORY: ORDERING SYSTEM PROVIDED HISTORY: cellulitis/r/o osteo TECHNOLOGIST PROVIDED HISTORY: Reason for exam:->cellulitis/r/o osteo Reason for Exam: right leg swelling, pain and redness, poss cellulitis, r/o osteo Acuity: Acute Type of Exam: Initial FINDINGS: There is no evidence of acute fracture or dislocation of the right tibia fibula. The joint spaces are intact. No evidence of destructive osseous lesion. There is soft tissue swelling. No evidence of soft tissue gas or radiopaque foreign body. No acute fracture or dislocation in the right tibia fibula. No destructive osseous lesion. Soft tissue swelling. No soft tissue gas or radiopaque foreign body. Nm Lung Scan Perfusion Only    Result Date: 9/21/2020  EXAMINATION: LUNG PERFUSION IMAGING 9/21/2020 4:39 pm TECHNIQUE: 4.2 millicuries of Tc 14E MAA was administered intravenously prior to planar imaging of the lungs in multiple projections. COMPARISON: Chest x-ray, 09/19/2020 HISTORY: ORDERING SYSTEM PROVIDED HISTORY: elevated d-dimer, history of PE TECHNOLOGIST PROVIDED HISTORY: Reason for exam:->elevated d-dimer, history of PE FINDINGS: Ventilation imaging was not performed. Perfusion is minimally inhomogeneous. There are no peripheral wedge-shaped defects. Low probability for pulmonary embolus. Us Renal Complete    Result Date: 9/21/2020  EXAMINATION: RETROPERITONEAL ULTRASOUND OF THE KIDNEYS AND URINARY BLADDER 9/21/2020 COMPARISON: None HISTORY: ORDERING SYSTEM PROVIDED HISTORY: ULISES TECHNOLOGIST PROVIDED HISTORY: Reason for exam:->ULISES FINDINGS: Kidneys: The right kidney measures 11.9 cm in length and the left kidney measures 12.3 cm in length. No hydronephrosis on right. No hydronephrosis on the left.  There is mild increased echogenicity throughout the liver. . Bladder: Bladder is collapsed and not well evaluated     No hydronephrosis noted Increased echogenicity throughout the liver, suggesting diffuse hepatocellular disease such as fatty infiltration     Xr Chest Portable    Result Date: 9/19/2020  EXAMINATION: ONE XRAY VIEW OF THE CHEST 9/19/2020 6:26 pm COMPARISON: 01/13/2014 HISTORY: ORDERING SYSTEM PROVIDED HISTORY: sob TECHNOLOGIST PROVIDED HISTORY: Reason for exam:->sob Reason for Exam: SOB, pt also has right leg swelling and redness Acuity: Acute Type of Exam: Initial FINDINGS: Cardiomediastinal silhouette is normal in size. Mild elevation the right hemidiaphragm. No pulmonary consolidation, pleural effusion, or pneumothorax. No acute osseous abnormality. No acute cardiopulmonary abnormality. Ct Femur Right Wo Contrast    Result Date: 9/19/2020  EXAMINATION: CT OF THE RIGHT FEMUR WITHOUT CONTRAST 9/19/2020 8:31 pm TECHNIQUE: CT of the right femur was performed without the administration of intravenous contrast.  Multiplanar reformatted images are provided for review. Dose modulation, iterative reconstruction, and/or weight based adjustment of the mA/kV was utilized to reduce the radiation dose to as low as reasonably achievable. COMPARISON: None. HISTORY ORDERING SYSTEM PROVIDED HISTORY: right thigh/calf infection. Eval for [employer] TECHNOLOGIST PROVIDED HISTORY: Reason for exam:->right thigh/calf infection. Eval for [employer] Reason for Exam: right thigh/calf infection. Eval for [employer] Acuity: Acute Type of Exam: Initial FINDINGS: Bones: No fracture or dislocation. No osseous erosion. No suspicious lytic or blastic osseous lesion. Soft Tissue:  Subcutaneous fat stranding along the lateral and to a lesser degree posterior aspects of the right thigh. Circumferential subcutaneous fat stranding throughout the calf. No drainable fluid collection or soft tissue gas. The visualized musculature is normal in appearance. No fascial edema identified. Mild atherosclerotic vascular calcifications. The visualized soft tissue contents of the pelvis are grossly unremarkable. Joint:  Mild right hip degenerative changes. Mild medial compartment degenerative changes of the knee. Small right knee effusion. No popliteal cyst.  The ankle is not included in the field of view. 1. Subcutaneous fat stranding of the right lower extremity compatible with cellulitis. No drainable fluid collection, soft tissue gas, or evidence of fasciitis. 2. Small nonspecific right knee effusion with mild medial compartment degenerative changes. Mri Tibia Fibula Left W Wo Contrast    Result Date: 9/22/2020  EXAMINATION: MRI OF THE LEFT TIBIA/FIBULA WITHOUT AND WITH CONTRAST 9/22/2020 5:06 pm TECHNIQUE: Multiplanar multisequence MRI of the left tibia/fibula was performed without and with the administration of intravenous contrast. COMPARISON: None. HISTORY: ORDERING SYSTEM PROVIDED HISTORY: severe soft tissue infection, sepsis, r/o nec fas TECHNOLOGIST PROVIDED HISTORY: Reason for exam:->severe soft tissue infection, sepsis, r/o nec fas FINDINGS: Diffuse subcutaneous edema throughout the left leg. No drainable fluid collection or abnormal soft tissue mass. The visualized musculature is normal in appearance. No fascial edema identified. The visualized tendons are grossly intact. No fracture or dislocation. No abnormal marrow edema or osseous erosion. The knee and tibiotalar joints are grossly unremarkable. 1. Diffuse subcutaneous edema compatible with cellulitis. No abscess or fasciitis. 2. No acute osseous abnormality. Ir Picc Wo Sq Port/pump > 5 Years    Result Date: 9/23/2020  EXAMINATION: LIMITED ULTRASOUND OF THE ARM FOR PICC ACCESS, 9/23/2020 TECHNIQUE: The PICC team used ultrasound and 3CG guidance to place a PICC line.  HISTORY: ORDERING SYSTEM PROVIDED HISTORY: IV abx TECHNOLOGIST PROVIDED HISTORY: Reason for exam:->IV abx How many lumens are being requested?->2 What site is the preferred site? ->No preference What side should this line be placed? ->Either FLUOROSCOPY DOSE AND TYPE OR TIME AND EXPOSURES: None FINDINGS: Ultrasound images demonstrate patency of the right basilic vein which was used for access for placement of a PICC by the PICC team, without a radiologist present. Right basilic vein measures 2.13 cm in transverse diameter in the region of the access site. 3CG guidance was used by the PICC team. By report, a 41 cm dual lumen PICC line was placed. Successful placement of PICC line. Vl Extremity Venous Right    Result Date: 9/21/2020  Lower Extremities DVT Study  Demographics   Patient Name        Dali Joseph   Date of Study       09/21/2020  Gender                 Male   Patient Number      [id number]  Date of Birth          1955   Visit Number        [id number]   Age                    63 year(s)   Accession Number    [id number]  Room Number            0566   Corporate ID        [id number]    Sonographer            Windy Garcia RDMS, T   Ordering Physician              Interpreting Physician [facility]                                                         Readers                                                         Kim Tavares MD  Procedure Type of Study:   Veins:Lower Extremities DVT Study, VL EXTREMITY VENOUS DUPLEX RIGHT. Vascular Sonographer Report  Indications for Study:Swelling. Additional Indications:Right leg edema for approximately four days. Venous Duplex Scan: B-mode imaging of the deep and superficial veins, with compression maneuvers, including color and Doppler spectral waveform analysis. Impressions Right Impression There is no evidence of deep or superficial venous thrombosis involving the right lower extremity.  Left Impression There is no evidence of deep !Yes       !Yes            ! None      ! +------------------------+----------+---------------+----------+ ! Popliteal               !Yes       ! Yes            ! None      ! +------------------------+----------+---------------+----------+ ! GSV Below Knee          ! Yes       ! Yes            ! None      ! +------------------------+----------+---------------+----------+ ! Gastroc                 ! Yes       ! Yes            ! None      ! +------------------------+----------+---------------+----------+ ! Soleal                  !Yes       ! Yes            ! None      ! +------------------------+----------+---------------+----------+ ! PTV                     ! Yes       ! Yes            ! None      ! +------------------------+----------+---------------+----------+ ! Peroneal                !Yes       ! Yes            ! None      ! +------------------------+----------+---------------+----------+ ! GSV Calf                ! Yes       ! Yes            ! None      ! +------------------------+----------+---------------+----------+ ! SSV                     ! Yes       ! Yes            ! None      ! +------------------------+----------+---------------+----------+ Right Doppler Measurements +------------------------+------+------+------------+ ! Location                ! Signal!Reflux! Reflux (sec)! +------------------------+------+------+------------+ ! Sapheno Femoral Junction! Phasic! No    !            ! +------------------------+------+------+------------+ ! Common Femoral          !Phasic! No    !            ! +------------------------+------+------+------------+ ! Femoral                 !Phasic! No    !            ! +------------------------+------+------+------------+ ! Deep Femoral            !Phasic! No    !            ! +------------------------+------+------+------------+ ! Popliteal               !Phasic! No    !            ! +------------------------+------+------+------------+ Left Lower Extremities DVT Study Measurements Left 2D Measurements +--------------+----------+---------------+----------+ ! Location      ! Visualized! Compressibility! Thrombosis! +--------------+----------+---------------+----------+ ! Common Femoral!Yes       ! Yes            ! None      ! +--------------+----------+---------------+----------+ Left Doppler Measurements +--------------+------+------+------------+ ! Location      ! Signal!Reflux! Reflux (sec)! +--------------+------+------+------------+ ! Common Femoral!Phasic! No    !            ! +--------------+------+------+------------+      EKG     Rhythm: normal sinus   Rate: normal  Clinical Impression: no acute changes        The patient was seen and examined on day of discharge and this discharge summary is in conjunction with any daily progress note from day of discharge. Time Spent on discharge is 45 minutes  in the examination, evaluation, counseling and review of medications and discharge plan. Note that more than 30 minutes was spent in preparing discharge papers, discussing discharge with patient, medication review, etc.       Signed:    RERE Sheffield - CNP   9/30/2020      Thank you Ambreen Cruz MD for the opportunity to be involved in this patient's care. If you have any questions or concerns please feel free to contact me.

## 2020-09-30 NOTE — CARE COORDINATION
[facility] unable to staff timely  [facility] accepted referal spoke with Moy Hussein RN, BSN CTN  [facility] [phone]

## 2020-10-01 NOTE — ADT AUTH CERT
Cellulitis - Care Day 12 (9/30/2020) by Brisa Thompson RN         Review Status  Review Entered    Completed  10/1/2020 09:22        Criteria Review       Care Day: 12 Care Date: 9/30/2020 Level of Care:    Guideline Day 3    Level Of Care    (X) Floor to discharge [E]    (X) Complete discharge planning    10/1/2020 9:22 AM EDT by Misael Paul home with family    Clinical Status    (X) * Hemodynamic stability    10/1/2020 9:22 AM EDT by Tito Grajeda      98.0  17  76  142/79  96%ra    (X) * Afebrile or fever improved    (X) * Skin exam stable or improved    (X) * Mental status at baseline    (X) * Antibiotic treatment needs appropriate for next level of care    (X) * Pain absent or manageable at next level of care    10/1/2020 9:22 AM EDT by Tito Grajeda      rated pain 7/10    ( ) * Discharge plans and education understood    Activity    (X) * Ambulatory or acceptable for next level of care    Routes    (X) * Oral hydration, medications, [F] and diet    10/1/2020 9:22 AM EDT by Tito Grajeda      roxicodone is given  symbicort bid    Interventions    (X) WBC    Medications    (X) Parenteral or oral antibiotics [B]    10/1/2020 9:22 AM EDT by Tito Grajeda      cleocin 900mg iv given and changed to oral 300mg qid  zosyn 3.375 g ivgiven x1    * Milestone    Additional Notes    9/30    Per ID:    Doing much better    Local infection largely resolved         Change to po clinda po x7 days         He will follow-up with me in 2 weeks, call with concerns         If he goes home may benefit from home care for wound care        Cellulitis - Care Day 10 (9/28/2020) by Brisa Thompson, RN         Review Status  Review Entered    Completed  9/30/2020 08:54        Criteria Review       Care Day: 10 Care Date: 9/28/2020 Level of Care:    Guideline Day 3    Clinical Status    (X) * Hemodynamic stability    9/30/2020 8:54 AM EDT by Tito Grajeda      98.2  16  80  126/79  95%ra      rates pain 7/10    (X) * Afebrile or fever improved    (X) * Skin exam stable or improved    9/30/2020 8:54 AM EDT by Ehsan Leyva      per ID:  marked improvement rle    (X) * Mental status at baseline    ( ) * Antibiotic treatment needs appropriate for next level of care    (X) * Pain absent or manageable at next level of care    9/30/2020 8:54 AM EDT by Ehsan Leyva      roxicodone 10mg given x6 doses    ( ) * Discharge plans and education understood    Activity    ( ) * Ambulatory or acceptable for next level of care    Routes    (X) * Oral hydration, medications, [F] and diet    Interventions    (X) WBC    9/30/2020 8:54 AM EDT by Ehsan Leyva      9.6    Medications    (X) Parenteral or oral antibiotics [B]    9/30/2020 8:54 AM EDT by Ehsan Leyva      cleocin 900mg iv q8hrs, zosyn 3.375 g iv q8hrs    * Milestone    Additional Notes    9/28    Per ID:    Marked improvement in the R leg - erythema faded.  Petechiae more prominent large bullae, proximal to knee unroofed          Continue Zosyn, clinda    Overall markedly improved    Could likely be changed to po abx in the next few days    His mobility may be a factor in disposition.  Encouraged his participation with PT/OT

## 2020-10-05 ENCOUNTER — TELEPHONE (OUTPATIENT)
Dept: INFECTIOUS DISEASES | Age: 65
End: 2020-10-05

## 2020-10-05 RX ORDER — CLINDAMYCIN HYDROCHLORIDE 300 MG/1
300 CAPSULE ORAL 4 TIMES DAILY
Qty: 28 CAPSULE | Refills: 0 | Status: SHIPPED | OUTPATIENT
Start: 2020-10-05 | End: 2020-10-12

## 2020-10-05 RX ORDER — LACTOBACILLUS RHAMNOSUS GG 10B CELL
2 CAPSULE ORAL 2 TIMES DAILY WITH MEALS
Qty: 60 CAPSULE | Refills: 0 | Status: SHIPPED | OUTPATIENT
Start: 2020-10-05 | End: 2021-12-07

## 2020-10-06 ENCOUNTER — TELEPHONE (OUTPATIENT)
Dept: INFECTIOUS DISEASES | Age: 65
End: 2020-10-06

## 2020-10-12 ENCOUNTER — TELEPHONE (OUTPATIENT)
Dept: INFECTIOUS DISEASES | Age: 65
End: 2020-10-12

## 2020-10-12 NOTE — TELEPHONE ENCOUNTER
Patient called reporting his legs still have sores and infected. Patient would like to speak with you regarding this. Patient reports his daughter uploaded his last pics to [facility] and she is not there to help him upload new ones.

## 2020-10-12 NOTE — TELEPHONE ENCOUNTER
Patient called back asking to speak with you please.  Is concerned since his clindamycin course is done on 10/14

## 2020-10-12 NOTE — TELEPHONE ENCOUNTER
Called patient   \"Large scabs and seepage\"  Worried about the clindamycin Rx running out    Patient reassured.   Photos from last week without signs of infection     He will send updated photo via email

## 2020-10-21 RX ORDER — MELOXICAM 15 MG/1
TABLET ORAL
Qty: 90 TABLET | Refills: 0 | Status: SHIPPED | OUTPATIENT
Start: 2020-10-21 | End: 2021-01-18

## 2020-11-03 ENCOUNTER — OFFICE VISIT (OUTPATIENT)
Dept: INFECTIOUS DISEASES | Age: 65
End: 2020-11-03
Payer: COMMERCIAL

## 2020-11-03 VITALS
WEIGHT: 213 LBS | SYSTOLIC BLOOD PRESSURE: 142 MMHG | HEIGHT: 72 IN | TEMPERATURE: 97.7 F | BODY MASS INDEX: 28.85 KG/M2 | DIASTOLIC BLOOD PRESSURE: 82 MMHG

## 2020-11-03 PROCEDURE — 99213 OFFICE O/P EST LOW 20 MIN: CPT | Performed by: INTERNAL MEDICINE

## 2020-11-03 RX ORDER — AMOXICILLIN AND CLAVULANATE POTASSIUM 875; 125 MG/1; MG/1
1 TABLET, FILM COATED ORAL 2 TIMES DAILY
Qty: 14 TABLET | Refills: 0 | Status: SHIPPED | OUTPATIENT
Start: 2020-11-03 | End: 2020-11-10

## 2020-11-03 NOTE — PROGRESS NOTES
Department of Internal Medicine  Infectious Diseases      Patient Name: Kip Calloway      Date of visit: 11/3/2020  SUBJECTIVE:  Dawna Stevenson  is a 63 y.o. male who returns today for hospital follow-up. He was admitted 9/19-9/30/20 with severe LE cellulitis without guiding micro data but presumed to be sreptococcal.    Improvement was slow. He was ultimately discharged on po clinda. It was renewed on 10/5/20. Since then he has had [facility] nurse visits for wound check. Large eschar slowly resolved. He has been off of abx for several days. Today concerned about persistent erythema and a soft area just dital.lateral to R knee. Without F/C. Ambulating on the leg without difficulty. REVIEW OF SYSTEMS:    CONSTITUTIONAL:  negative for fevers, chills, diaphoresis, activity change, appetite change, fatigue, night sweats and unexpected weight change.    EYES:  negative for blurred vision, eye discharge, visual disturbance and icterus  HEENT:  negative for hearing loss, tinnitus, ear drainage, sinus pressure, nasal congestion, epistaxis and snoring  RESPIRATORY:  No cough or hemoptysis   CARDIOVASCULAR:  negative for chest pain, palpitations, exertional chest pressure/discomfort, edema, syncope  GASTROINTESTINAL:  negative for nausea, vomiting, diarrhea, constipation, blood in stool and abdominal pain  GENITOURINARY:  negative for frequency, dysuria, urinary incontinence, decreased urine volume, and hematuria  HEMATOLOGIC/LYMPHATIC:  negative for easy bruising, bleeding and lymphadenopathy  ALLERGIC/IMMUNOLOGIC:  negative for recurrent infections, angioedema, anaphylaxis and drug reactions  ENDOCRINE:  negative for weight changes and diabetic symptoms including polyuria, polydipsia and polyphagia  MUSCULOSKELETAL:  negative for acute joint swelling, decreased range of motion and muscle weakness  NEUROLOGICAL:  negative for headaches, slurred speech, unilateral weakness  PSYCHIATRIC/BEHAVIORAL: negative for hallucinations, behavioral problems, confusion and agitation. Medications:    Current Outpatient Medications   Medication Sig Dispense Refill    meloxicam (MOBIC) 15 MG tablet TAKE 1 TABLET BY MOUTH ONE TIME A DAY  90 tablet 0    lactobacillus (CULTURELLE) capsule Take 2 capsules by mouth 2 times daily (with meals) 60 capsule 0    lisinopril (PRINIVIL;ZESTRIL) 10 MG tablet Take 1 tablet by mouth daily 30 tablet 3    tamsulosin (FLOMAX) 0.4 MG capsule Take 0.4 mg by mouth nightly      ADVAIR DISKUS 100-50 MCG/DOSE diskus inhaler INHALE 1 DOSE BY MOUTH TWICE DAILY. RINSE MOUTH AFTER USE 1 Inhaler 1    allopurinol (ZYLOPRIM) 300 MG tablet 1/2 pill a day. For gout prevention. (Patient taking differently: 300 mg For gout prevention. ) 15 tablet 6     No current facility-administered medications for this visit. Physical:  VITALS:  BP (!) 142/82   Temp 97.7 °F (36.5 °C) (Infrared)   Ht 6' (1.829 m)   Wt 213 lb (96.6 kg)   BMI 28.89 kg/m²     General Appearance:   Alert, oriented, NAD   Skin:   Warm ,dry. No focal rash  Brawny edema lower R leg. Faint confluent erythema lower R leg  Small area of fluctuance distal / lateral to the knee, scant amount of purulence expressed   Abdomen:   Soft, flat, NT       Cultures:   9/19     BC x2 NGTD  9/22     BC x2 NGTD         Radiology Review:  All pertinent images / reports were reviewed as a part of this visit. CT R femur 9/19/20   Impression    1. Subcutaneous fat stranding of the right lower extremity compatible with    cellulitis.  No drainable fluid collection, soft tissue gas, or evidence of    fasciitis. 2. Small nonspecific right knee effusion with mild medial compartment    degenerative changes.       Doppler 9/21/20   No evidence of deep vein or superficial thrombosis involving the right lower     extremity and the contralateral proximal common femoral vein.       MRI RLE 9/22/20   Impression    1. Diffuse subcutaneous edema compatible with cellulitis.  No abscess or    fasciitis.     2. No acute osseous abnormality.            Assessment:            Patient Active Problem List     Diagnosis Date Noted    History of pulmonary embolism         Priority: High    Hyperlipemia 03/31/2011       Priority: High    Pulmonary embolism ([facility] [address] 03/30/2011       Priority: High       Overview Note:       Replacing Inactive Diagnoses       Gout 09/01/2014       Priority: Medium    Asthma         Priority: Medium    Tremor, essential         Priority: Medium    Schatzki's ring         Priority: Medium    Toenail fungus         Priority: Low    LVH (left ventricular hypertrophy)         Priority: Low    HH (hiatus hernia)         Priority: Low    Rosacea         Priority: Low    Pneumonia 03/31/2011       Priority: Low    Dyspnea 03/31/2011       Priority: Low    Constipation 03/31/2011       Priority: Low    S/P shoulder surgery 03/31/2011       Priority: Low       Overview Note:       Right       Cellulitis of right lower extremity 09/19/2020    ULISES (acute kidney injury) ([facility] [address] 09/19/2020    [facility] 09/19/2020    Hyponatremia 09/19/2020    Pain in limb 07/20/2015    Finger pain 07/13/2015         Severe RLE cellulitis prompting admission 9/2020  No guiding micro data  BC and ASO/streptozyme negative  No evidence deep seeded infection by MRI/CT     Persistent erythema, possible mild cellulitis with small abscess  Culture obtained      No abx allergies       Plan      Pending wound culture, Rx Augmentin provided   Elevate the leg   Call with concerns         Mone Galeas MD

## 2020-11-05 ENCOUNTER — TELEPHONE (OUTPATIENT)
Dept: INFECTIOUS DISEASES | Age: 65
End: 2020-11-05

## 2020-11-05 LAB
GRAM STAIN RESULT: ABNORMAL
ORGANISM: ABNORMAL
WOUND/ABSCESS: ABNORMAL

## 2020-11-05 RX ORDER — DOXYCYCLINE HYCLATE 100 MG
100 TABLET ORAL 2 TIMES DAILY
Qty: 14 TABLET | Refills: 0 | Status: SHIPPED | OUTPATIENT
Start: 2020-11-05 | End: 2020-11-12

## 2020-11-05 NOTE — TELEPHONE ENCOUNTER
Wound culture collected in office earlier this week - heavy growth of all 3 of GpB strep, E coli, MRSA  Gram's stain with 1+ GNR     Escherichia coli   Antibiotic  Interpretation  RAMESH  Status     ampicillin  Sensitive  <=2  mcg/mL      ceFAZolin  Sensitive  <=4  mcg/mL      cefepime  Sensitive  <=0.12  mcg/mL      cefTRIAXone  Sensitive  <=0.25  mcg/mL      ciprofloxacin  Sensitive  <=0.25  mcg/mL      ertapenem  Sensitive  <=0.12  mcg/mL      gentamicin  Sensitive  <=1  mcg/mL      levofloxacin  Sensitive  <=0.12  mcg/mL      piperacillin-tazobactam  Sensitive  <=4  mcg/mL      trimethoprim-sulfamethoxazole  Sensitive  <=20  mcg/mL      Staph aureus mrsa   Antibiotic  Interpretation  RAMESH  Status     clindamycin  Sensitive  <=0.25  mcg/mL      erythromycin  Resistant  >=8  mcg/mL      oxacillin  Resistant  >=4  mcg/mL      tetracycline  Sensitive  <=1  mcg/mL      trimethoprim-sulfamethoxazole  Sensitive  <=10  mcg/mL      vancomycin  Sensitive  <=0.5  mcg/mL          Called patient   He is about the same  No drainage  Wearing compression stockings  No drainage     Tolerating Augmentin  Add doxy x7d    F/u with me 2 weeks or so

## 2020-11-05 NOTE — TELEPHONE ENCOUNTER
Patient spoke with you today and called to schedule a f/u with his wound. He wanted to schedule for 11/17/2020, but nothing is available in clinic visit until 12/1/202. He is hoping that you can work him in on 11/17/2020 if possible.

## 2020-11-10 ENCOUNTER — TELEPHONE (OUTPATIENT)
Dept: INFECTIOUS DISEASES | Age: 65
End: 2020-11-10

## 2020-11-10 NOTE — LETTER
[address] [facility] [place] [facility]  [address] [facility] [address]  Phone: [phone]  Fax: [phone]    Alfonso Zacarias MD        November 10, 2020    [address] [place] [address]      To Whom It May Concern:    Christina Schuster should be doing light duty and avoiding any contact with waste materials or other contaminants until his right leg is fully healed. I anticipate this to be 2 weeks. If you have any questions or concerns, please don't hesitate to call.     Sincerely,        Alfonso Zacarias MD

## 2020-11-10 NOTE — TELEPHONE ENCOUNTER
Yes, can provide    Need to avoid any contact with waste materials or other contaminants until R leg fully healed.   Anticipate x 2 more weeks

## 2020-11-10 NOTE — TELEPHONE ENCOUNTER
Letter written and emailed to [email or web address] and [email or web address].  Spoke with the patient

## 2020-11-17 ENCOUNTER — OFFICE VISIT (OUTPATIENT)
Dept: INFECTIOUS DISEASES | Age: 65
End: 2020-11-17
Payer: COMMERCIAL

## 2020-11-17 VITALS
BODY MASS INDEX: 29.7 KG/M2 | SYSTOLIC BLOOD PRESSURE: 126 MMHG | DIASTOLIC BLOOD PRESSURE: 78 MMHG | WEIGHT: 219 LBS | TEMPERATURE: 98 F

## 2020-11-17 PROCEDURE — 99213 OFFICE O/P EST LOW 20 MIN: CPT | Performed by: INTERNAL MEDICINE

## 2020-11-17 RX ORDER — CHLORHEXIDINE GLUCONATE 4 G/100ML
SOLUTION TOPICAL
Qty: 236 ML | Refills: 1 | Status: SHIPPED | OUTPATIENT
Start: 2020-11-17 | End: 2020-12-01

## 2020-11-17 NOTE — PROGRESS NOTES
Department of Internal Medicine  Infectious Diseases      Patient Name: Ritu Herrera      Date of visit: 11/17/2020  SUBJECTIVE:  Rosie Duane  is a 63 y.o. male who returns today for follow-up RLE cellulitis and secondary abscess. He was admitted 9/19-9/30/20 with severe LE cellulitis without guiding micro data but presumed to be sreptococcal.    Improvement was slow. He was ultimately discharged on po clinda. It was renewed on 10/5/20. Since then he has had [facility] nurse visits for wound check. Large eschar slowly resolved. He has been off of abx for several days. Seen 2 weeks ago 11/3/20 - wound culture collected - E coli, MRSA, GpB strep   Today concerned about persistent erythema and a soft area just distal.lateral to R knee. Without F/C. Ambulating on the leg without difficulty. REVIEW OF SYSTEMS:    CONSTITUTIONAL:  negative for fevers, chills, diaphoresis, activity change, appetite change, fatigue, night sweats and unexpected weight change.    EYES:  negative for blurred vision, eye discharge, visual disturbance and icterus  HEENT:  negative for hearing loss, tinnitus, ear drainage, sinus pressure, nasal congestion, epistaxis and snoring  RESPIRATORY:  No cough or hemoptysis   CARDIOVASCULAR:  negative for chest pain, palpitations, exertional chest pressure/discomfort, edema, syncope  GASTROINTESTINAL:  negative for nausea, vomiting, diarrhea, constipation, blood in stool and abdominal pain  GENITOURINARY:  negative for frequency, dysuria, urinary incontinence, decreased urine volume, and hematuria  HEMATOLOGIC/LYMPHATIC:  negative for easy bruising, bleeding and lymphadenopathy  ALLERGIC/IMMUNOLOGIC:  negative for recurrent infections, angioedema, anaphylaxis and drug reactions  ENDOCRINE:  negative for weight changes and diabetic symptoms including polyuria, polydipsia and polyphagia  MUSCULOSKELETAL:  negative for acute joint swelling, decreased range of motion and muscle weakness  NEUROLOGICAL:  negative for headaches, slurred speech, unilateral weakness  PSYCHIATRIC/BEHAVIORAL: negative for hallucinations, behavioral problems, confusion and agitation. Medications:    Current Outpatient Medications   Medication Sig Dispense Refill    meloxicam (MOBIC) 15 MG tablet TAKE 1 TABLET BY MOUTH ONE TIME A DAY  90 tablet 0    lactobacillus (CULTURELLE) capsule Take 2 capsules by mouth 2 times daily (with meals) 60 capsule 0    lisinopril (PRINIVIL;ZESTRIL) 10 MG tablet Take 1 tablet by mouth daily 30 tablet 3    tamsulosin (FLOMAX) 0.4 MG capsule Take 0.4 mg by mouth nightly      ADVAIR DISKUS 100-50 MCG/DOSE diskus inhaler INHALE 1 DOSE BY MOUTH TWICE DAILY. RINSE MOUTH AFTER USE 1 Inhaler 1    allopurinol (ZYLOPRIM) 300 MG tablet 1/2 pill a day. For gout prevention. (Patient taking differently: 300 mg For gout prevention. ) 15 tablet 6     No current facility-administered medications for this visit. Physical:  VITALS:  /78   Temp 98 °F (36.7 °C) (Infrared)   Wt 219 lb (99.3 kg)   BMI 29.70 kg/m²     General Appearance:   Alert, oriented, NAD   Skin:   Warm ,dry. No focal rash  Brawny edema lower R leg. Faint confluent erythema lower R leg  Area of fluctuance distal / lateral to the knee has resolved. Pigment change/scarring/stasis changes lower R leg. No cellulitic change noted.    Abdomen:   Soft, flat, NT       Cultures:   9/19     BC x2 neg  9/22     BC x2 neg    11/3/20 wound culture RLE GpBS, E coli, MRSA   Escherichia coli   Antibiotic  Interpretation  RAMESH  Status     ampicillin  Sensitive  <=2  mcg/mL      ceFAZolin  Sensitive  <=4  mcg/mL      cefepime  Sensitive  <=0.12  mcg/mL      cefTRIAXone  Sensitive  <=0.25  mcg/mL      ciprofloxacin  Sensitive  <=0.25  mcg/mL      ertapenem  Sensitive  <=0.12  mcg/mL      gentamicin  Sensitive  <=1  mcg/mL      levofloxacin  Sensitive  <=0.12  mcg/mL      piperacillin-tazobactam  Sensitive  <=4  mcg/mL trimethoprim-sulfamethoxazole  Sensitive  <=20  mcg/mL      Staph aureus mrsa   Antibiotic  Interpretation  RAMESH  Status     clindamycin  Sensitive  <=0.25  mcg/mL      erythromycin  Resistant  >=8  mcg/mL      oxacillin  Resistant  >=4  mcg/mL      tetracycline  Sensitive  <=1  mcg/mL      trimethoprim-sulfamethoxazole  Sensitive  <=10  mcg/mL      vancomycin  Sensitive  <=0.5  mcg/mL              Radiology Review:  All pertinent images / reports were reviewed as a part of this visit. CT R femur 9/19/20   Impression    1. Subcutaneous fat stranding of the right lower extremity compatible with    cellulitis.  No drainable fluid collection, soft tissue gas, or evidence of    fasciitis. 2. Small nonspecific right knee effusion with mild medial compartment    degenerative changes.       Doppler 9/21/20   No evidence of deep vein or superficial thrombosis involving the right lower     extremity and the contralateral proximal common femoral vein.       MRI RLE 9/22/20   Impression    1. Diffuse subcutaneous edema compatible with cellulitis.  No abscess or    fasciitis.     2. No acute osseous abnormality.            Assessment:            Patient Active Problem List     Diagnosis Date Noted    History of pulmonary embolism         Priority: High    Hyperlipemia 03/31/2011       Priority: High    Pulmonary embolism ([address] 03/30/2011       Priority: High       Overview Note:       Replacing Inactive Diagnoses       Gout 09/01/2014       Priority: Medium    Asthma         Priority: Medium    Tremor, essential         Priority: Medium    Schatzki's ring         Priority: Medium    Toenail fungus         Priority: Low    LVH (left ventricular hypertrophy)         Priority: Low    HH (hiatus hernia)         Priority: Low    Rosacea         Priority: Low    Pneumonia 03/31/2011       Priority: Low    Dyspnea 03/31/2011       Priority: Low    Constipation 03/31/2011       Priority: Low    S/P shoulder surgery 03/31/2011       Priority: Low       Overview Note:       Right       Cellulitis of right lower extremity 09/19/2020    ULISES (acute kidney injury) ([facility] [facility] 09/19/2020    Sepsis with acute organ dysfunction ([facility] 09/19/2020    Hyponatremia 09/19/2020    Pain in limb 07/20/2015    Finger pain 07/13/2015         Severe RLE cellulitis prompting admission 9/2020  No guiding micro data  Resolved    Secondary abscess, plymicrobial  Treated with cipro/doxy with resolution   Resolved      No abx allergies       Plan      Expectant management   Manage swelling in the limb with compression   CHG wash weekly     RTC PRN         Marycruz Rosario MD

## 2020-12-03 ENCOUNTER — TELEPHONE (OUTPATIENT)
Dept: INFECTIOUS DISEASES | Age: 65
End: 2020-12-03

## 2020-12-03 RX ORDER — SULFAMETHOXAZOLE AND TRIMETHOPRIM 800; 160 MG/1; MG/1
1 TABLET ORAL 3 TIMES DAILY
Qty: 21 TABLET | Refills: 0 | Status: SHIPPED | OUTPATIENT
Start: 2020-12-03 | End: 2020-12-10

## 2020-12-03 NOTE — TELEPHONE ENCOUNTER
Pt called in stating that his left leg has a raised spot, feels soft, tender to the touch. He was pushing on it last night and a small area ruptured and there was dark blood/discharge. Pt is concerned, he is scared he is going to lose his leg. States that the area that ruptured is same spot that you swabbed during last visit for culture. He asked if you could call him please to discuss. He placed a bandage and compression stocking over area.

## 2020-12-03 NOTE — TELEPHONE ENCOUNTER
Quarter sized abscess lateral leg below the knee   Rx Bactrim DS TID x7 days   Warm compress    Call back if it fails to resolve

## 2020-12-11 ENCOUNTER — TELEPHONE (OUTPATIENT)
Dept: INFECTIOUS DISEASES | Age: 65
End: 2020-12-11

## 2020-12-11 NOTE — TELEPHONE ENCOUNTER
Patient would like to speak with Dr. Carlotta Givens. He is concerned about the color of his leg and that he is taking the last of his Bactrim.

## 2021-01-15 DIAGNOSIS — S83.241A TEAR OF MEDIAL MENISCUS OF RIGHT KNEE, CURRENT, UNSPECIFIED TEAR TYPE, INITIAL ENCOUNTER: ICD-10-CM

## 2021-01-18 RX ORDER — MELOXICAM 15 MG/1
TABLET ORAL
Qty: 90 TABLET | Refills: 0 | Status: SHIPPED | OUTPATIENT
Start: 2021-01-18

## 2021-09-28 ENCOUNTER — HOSPITAL ENCOUNTER (EMERGENCY)
Age: 66
Discharge: HOME | End: 2021-09-28
Payer: COMMERCIAL

## 2021-09-28 VITALS
BODY MASS INDEX: 29.12 KG/M2 | SYSTOLIC BLOOD PRESSURE: 159 MMHG | HEART RATE: 60 BPM | OXYGEN SATURATION: 96 % | WEIGHT: 215 LBS | DIASTOLIC BLOOD PRESSURE: 110 MMHG | TEMPERATURE: 98.1 F | RESPIRATION RATE: 16 BRPM | HEIGHT: 72 IN

## 2021-09-28 DIAGNOSIS — R39.198 DIFFICULTY URINATING: Primary | ICD-10-CM

## 2021-09-28 LAB
AMORPHOUS: ABNORMAL /HPF
BACTERIA: ABNORMAL /HPF
BILIRUBIN URINE: NEGATIVE
BLOOD, URINE: ABNORMAL
CLARITY: CLEAR
COLOR: YELLOW
EPITHELIAL CELLS, UA: ABNORMAL /HPF (ref 0–5)
GLUCOSE URINE: NEGATIVE MG/DL
KETONES, URINE: NEGATIVE MG/DL
LEUKOCYTE ESTERASE, URINE: NEGATIVE
MICROSCOPIC EXAMINATION: YES
NITRITE, URINE: NEGATIVE
PH UA: 6 (ref 5–8)
PROTEIN UA: NEGATIVE MG/DL
RBC UA: ABNORMAL /HPF (ref 0–4)
SPECIFIC GRAVITY UA: 1.02 (ref 1–1.03)
URINE REFLEX TO CULTURE: ABNORMAL
URINE TYPE: ABNORMAL
UROBILINOGEN, URINE: 0.2 E.U./DL
WBC UA: ABNORMAL /HPF (ref 0–5)

## 2021-09-28 PROCEDURE — 87086 URINE CULTURE/COLONY COUNT: CPT

## 2021-09-28 PROCEDURE — 81001 URINALYSIS AUTO W/SCOPE: CPT

## 2021-09-28 PROCEDURE — 51798 US URINE CAPACITY MEASURE: CPT

## 2021-09-28 PROCEDURE — 99283 EMERGENCY DEPT VISIT LOW MDM: CPT

## 2021-09-28 ASSESSMENT — PAIN SCALES - GENERAL: PAINLEVEL_OUTOF10: 2

## 2021-09-28 ASSESSMENT — PAIN DESCRIPTION - DESCRIPTORS: DESCRIPTORS: PRESSURE

## 2021-09-28 NOTE — ED PROVIDER NOTES
[facility] Emergency Department    CHIEF COMPLAINT  Urinary Retention (pt reports that he went to urinate today at 1300 and only \"dribbled\". pt denies dysuria or hematuria. Pt reports mild pressure in suprapubic region.)      HISTORY OF PRESENT ILLNESS  Kip Calloway is a 61 y.o. male who presents to the ED complaining of urinary retention. Patient reports this afternoon shortly after lunch he noticed that he was unable to urinate. Patient reports slight pressure over his bladder and feeling of \"fullness. \"  Patient denies fever, chills, body aches, abdominal pain, nausea, vomiting, diarrhea, dysuria, hematuria, penile discharge. No other complaints, modifying factors or associated symptoms. Nursing notes reviewed. Past Medical History:   Diagnosis Date    Asthma 1995    Enlarged prostate     Gout 9/2014    HH (hiatus hernia) 2012    [facility]    Hypertension     LVH (left ventricular hypertrophy) 03/2011    Echo concentric LVH    Pulmonary embolus ([address] 3/2011    After Shoulder Surgery    Rosacea     Schatzki's ring 06/2010    EGD s/p Dilatation    Toenail fungus     Foot and Toenail Fungus.  Tremor, essential     Positive Family History Mother.     Wears glasses     for reading     Past Surgical History:   Procedure Laterality Date    COLONOSCOPY  06/2010    Nl    ESOPHAGEAL DILATATION      INTRACAPSULAR CATARACT EXTRACTION Left 2/26/2020    PHACOEMULSIFICATION OF CATARACT LEFT EYE WITH INTRAOCULAR LENS IMPLANT performed by Pelon Russ MD at [facility] Right 5/13/2020    PHACOEMULSIFICATION OF CATARACT RIGHT EYE WITH INTRAOCULAR LENS IMPLANT performed by Pelon Russ MD at [place] [address] ARTHROSCOPY  3/11     right, david [place] , [place], mini open rotator cuff    UPPER GASTROINTESTINAL ENDOSCOPY  06/2010    Schatzki Ring s/p Dilatation     Family History   Problem Relation Age of Onset  Stroke Father     Asthma Father     Dementia Father     Pacemaker Mother     Cancer Sister         Breast CA    Cancer Sister         Cervical CA     Social History     Socioeconomic History    Marital status:      Spouse name: Not on file    Number of children: Not on file    Years of education: Not on file    Highest education level: Not on file   Occupational History    Not on file   Tobacco Use    Smoking status: Never Smoker    Smokeless tobacco: Never Used   Substance and Sexual Activity    Alcohol use: Not Currently     Comment: rarely    Drug use: No    Sexual activity: Not on file   Other Topics Concern    Not on file   Social History Narrative    Not on file     Social Determinants of Health     Financial Resource Strain:     Difficulty of Paying Living Expenses:    Food Insecurity:     Worried About Running Out of Food in the Last Year:     [address] in the Last Year:    Transportation Needs:     Lack of Transportation (Medical):  Lack of Transportation (Non-Medical):    Physical Activity:     Days of Exercise per Week:     Minutes of Exercise per Session:    Stress:     Feeling of Stress :    Social Connections:     Frequency of Communication with Friends and Family:     Frequency of Social Gatherings with Friends and Family:     Attends [employer] Services:     Active Member of Clubs or Organizations:     Attends Club or Organization Meetings:     Marital Status:    Intimate Partner Violence:     Fear of Current or Ex-Partner:     Emotionally Abused:     Physically Abused:     Sexually Abused:      No current facility-administered medications for this encounter.      Current Outpatient Medications   Medication Sig Dispense Refill    meloxicam (MOBIC) 15 MG tablet TAKE 1 TABLET BY MOUTH EVERY DAY  90 tablet 0    lactobacillus (CULTURELLE) capsule Take 2 capsules by mouth 2 times daily (with meals) 60 capsule 0    lisinopril (PRINIVIL;ZESTRIL) 10 MG Urine Type NotGiven     Urine Reflex to Culture Not Indicated    Microscopic Urinalysis   Result Value Ref Range    WBC, UA 0-2 0 - 5 /HPF    RBC, UA 0-2 0 - 4 /HPF    Epithelial Cells, UA 0-1 0 - 5 /HPF    Bacteria, UA Rare (A) None Seen /HPF    Amorphous, UA Rare /HPF         I estimate there is LOW risk for ACUTE APPENDICITIS, BOWEL OBSTRUCTION, CHOLECYSTITIS, DIVERTICULITIS, INCARCERATED HERNIA, PANCREATITIS, or PERFORATED BOWEL or ULCER, thus I consider the discharge disposition reasonable. Also, there is no evidence or peritonitis, sepsis, or toxicity. Doyne Bosworth and I have discussed the diagnosis and risks, and we agree with discharging home to follow-up with their primary doctor. We also discussed returning to the Emergency Department immediately if new or worsening symptoms occur. We have discussed the symptoms which are most concerning (e.g., bloody stool, fever, changing or worsening pain, vomiting) that necessitate immediate return. FINAL Impression    1. Difficulty urinating        Blood pressure (!) 159/110, pulse 60, temperature 98.1 °F (36.7 °C), temperature source Oral, resp. rate 16, height 6' (1.829 m), weight 215 lb (97.5 kg), SpO2 96 %.mdm    Patient was sent home with a prescription for below medication/s. I did [place] patient on appropriate use of these medication. Discharge Medication List as of 9/28/2021  4:54 PM              FOLLOW UP  [facility]  [address] [place] [address]  ED  [address] [facility] [address] [phone]          DISPOSITION  Patient was discharged to home in good condition. Comment: Please note this report has been produced using speech recognition software and may contain errors related to that system including errors in grammar, punctuation, and spelling, as well as words and phrases that may be inappropriate.  If there are any questions or concerns please feel free to contact the dictating provider for clarification.             RERE Gómez - LYN  09/28/21 2023

## 2021-09-29 LAB — URINE CULTURE, ROUTINE: NORMAL

## 2021-12-07 ENCOUNTER — OFFICE VISIT (OUTPATIENT)
Dept: INFECTIOUS DISEASES | Age: 66
End: 2021-12-07
Payer: COMMERCIAL

## 2021-12-07 VITALS
SYSTOLIC BLOOD PRESSURE: 116 MMHG | BODY MASS INDEX: 30.61 KG/M2 | DIASTOLIC BLOOD PRESSURE: 70 MMHG | TEMPERATURE: 98.3 F | WEIGHT: 226 LBS | HEIGHT: 72 IN

## 2021-12-07 DIAGNOSIS — B35.1 ONYCHOMYCOSIS: Primary | ICD-10-CM

## 2021-12-07 DIAGNOSIS — L03.115 CELLULITIS OF RIGHT LOWER EXTREMITY: ICD-10-CM

## 2021-12-07 DIAGNOSIS — B37.2 CANDIDAL INTERTRIGO: ICD-10-CM

## 2021-12-07 PROCEDURE — 99214 OFFICE O/P EST MOD 30 MIN: CPT | Performed by: INTERNAL MEDICINE

## 2021-12-07 RX ORDER — TERBINAFINE HYDROCHLORIDE 250 MG/1
250 TABLET ORAL DAILY
Qty: 84 TABLET | Refills: 0 | Status: SHIPPED | OUTPATIENT
Start: 2021-12-07 | End: 2022-03-01

## 2021-12-07 RX ORDER — NYSTATIN 100000 U/G
OINTMENT TOPICAL
Qty: 30 G | Refills: 1 | Status: SHIPPED | OUTPATIENT
Start: 2021-12-07 | End: 2022-03-09 | Stop reason: SDUPTHER

## 2021-12-07 NOTE — PROGRESS NOTES
Department of Internal Medicine  Infectious Diseases      Patient Name: John Mcpherson      Date of visit: 12/7/2021  SUBJECTIVE:  Sierra Malone  is a 61 y.o. male who returns today with concern of recurrent cellulitis     He was last seen in this office for this problem about 1 year ago following admission with very severe cellulitis    Today  He notes hyperpigmented patch of skin inferolateral to the R knee  The area is not painful. He denies fever, chills  He reports faithfully wearing compression sotcknigs       REVIEW OF SYSTEMS:    CONSTITUTIONAL:  negative for fevers, chills, diaphoresis, activity change, appetite change, fatigue, night sweats and unexpected weight change. EYES:  negative for blurred vision, eye discharge, visual disturbance and icterus  HEENT:  negative for hearing loss, tinnitus, ear drainage, sinus pressure, nasal congestion, epistaxis and snoring  RESPIRATORY:  No cough or hemoptysis   CARDIOVASCULAR:  negative for chest pain, palpitations, exertional chest pressure/discomfort, edema, syncope  GASTROINTESTINAL:  negative for nausea, vomiting, diarrhea, constipation, blood in stool and abdominal pain  GENITOURINARY:  negative for frequency, dysuria, urinary incontinence, decreased urine volume, and hematuria  HEMATOLOGIC/LYMPHATIC:  negative for easy bruising, bleeding and lymphadenopathy  ALLERGIC/IMMUNOLOGIC:  negative for recurrent infections, angioedema, anaphylaxis and drug reactions  ENDOCRINE:  negative for weight changes and diabetic symptoms including polyuria, polydipsia and polyphagia  MUSCULOSKELETAL:  negative for acute joint swelling, decreased range of motion and muscle weakness  NEUROLOGICAL:  negative for headaches, slurred speech, unilateral weakness  PSYCHIATRIC/BEHAVIORAL: negative for hallucinations, behavioral problems, confusion and agitation.        Medications:    Current Outpatient Medications   Medication Sig Dispense Refill    meloxicam (MOBIC) 15 MG tablet TAKE 1 TABLET BY MOUTH EVERY DAY  90 tablet 0    lisinopril (PRINIVIL;ZESTRIL) 10 MG tablet Take 1 tablet by mouth daily 30 tablet 3    tamsulosin (FLOMAX) 0.4 MG capsule Take 0.4 mg by mouth nightly      ADVAIR DISKUS 100-50 MCG/DOSE diskus inhaler INHALE 1 DOSE BY MOUTH TWICE DAILY. RINSE MOUTH AFTER USE 1 Inhaler 1    allopurinol (ZYLOPRIM) 300 MG tablet 1/2 pill a day. For gout prevention. (Patient taking differently: 300 mg For gout prevention. ) 15 tablet 6    lactobacillus (CULTURELLE) capsule Take 2 capsules by mouth 2 times daily (with meals) 60 capsule 0     No current facility-administered medications for this visit.          Physical:  VITALS:  /70   Temp 98.3 °F (36.8 °C) (Oral)   Ht 6' (1.829 m)   Wt 226 lb (102.5 kg)   BMI 30.65 kg/m²     General Appearance:   Alert, oriented, NAD   Skin:   Dry skin  hyperpigmentation lateral aspect R leg  Intertriginous erythematous nodular lesion   Thickened toe nails  Macerated toe web spaces       Cultures:   9/19     BC x2 neg  9/22     BC x2 neg    11/3/20 wound culture RLE GpBS, E coli, MRSA   Escherichia coli   Antibiotic  Interpretation  RAMESH  Status     ampicillin  Sensitive  <=2  mcg/mL      ceFAZolin  Sensitive  <=4  mcg/mL      cefepime  Sensitive  <=0.12  mcg/mL      cefTRIAXone  Sensitive  <=0.25  mcg/mL      ciprofloxacin  Sensitive  <=0.25  mcg/mL      ertapenem  Sensitive  <=0.12  mcg/mL      gentamicin  Sensitive  <=1  mcg/mL      levofloxacin  Sensitive  <=0.12  mcg/mL      piperacillin-tazobactam  Sensitive  <=4  mcg/mL      trimethoprim-sulfamethoxazole  Sensitive  <=20  mcg/mL      Staph aureus mrsa   Antibiotic  Interpretation  RAMESH  Status     clindamycin  Sensitive  <=0.25  mcg/mL      erythromycin  Resistant  >=8  mcg/mL      oxacillin  Resistant  >=4  mcg/mL      tetracycline  Sensitive  <=1  mcg/mL      trimethoprim-sulfamethoxazole  Sensitive  <=10  mcg/mL      vancomycin  Sensitive  <=0.5  mcg/mL     Radiology Review:  All pertinent images / reports were reviewed as a part of this visit. CT R femur 9/19/20   Impression    1. Subcutaneous fat stranding of the right lower extremity compatible with    cellulitis.  No drainable fluid collection, soft tissue gas, or evidence of    fasciitis. 2. Small nonspecific right knee effusion with mild medial compartment    degenerative changes.       Doppler 9/21/20   No evidence of deep vein or superficial thrombosis involving the right lower     extremity and the contralateral proximal common femoral vein.       MRI RLE 9/22/20   Impression    1. Diffuse subcutaneous edema compatible with cellulitis.  No abscess or    fasciitis.     2. No acute osseous abnormality.            Assessment:            Patient Active Problem List     Diagnosis Date Noted    History of pulmonary embolism         Priority: High    Hyperlipemia 03/31/2011       Priority: High    Pulmonary embolism ([facility] [address] 03/30/2011       Priority: High       Overview Note:       Replacing Inactive Diagnoses       Gout 09/01/2014       Priority: Medium    Asthma         Priority: Medium    Tremor, essential         Priority: Medium    Schatzki's ring         Priority: Medium    Toenail fungus         Priority: Low    LVH (left ventricular hypertrophy)         Priority: Low    HH (hiatus hernia)         Priority: Low    Rosacea         Priority: Low    Pneumonia 03/31/2011       Priority: Low    Dyspnea 03/31/2011       Priority: Low    Constipation 03/31/2011       Priority: Low    S/P shoulder surgery 03/31/2011       Priority: Low       Overview Note:       Right       Cellulitis of right lower extremity 09/19/2020    ULISES (acute kidney injury) ([facility] [address] 09/19/2020    Sepsis with acute organ dysfunction ([facility] [address] 09/19/2020    Hyponatremia 09/19/2020    Pain in limb 07/20/2015    Finger pain 07/13/2015         Severe RLE cellulitis prompting admission 9/2020  No guiding micro data  Resolved    Hyperpigmented macular lesons RLE  Most consistent with stasis dermatitis   LE swelling has been well managed with compression     Tinea pedis   Increases susceptibility to recurrent cellulitis    Fungal intertrigo in the groin folds     Onychomycosis   Increases susceptibility to recurrent cellulitis       No abx allergies       Plan      Manage swelling in the limb with compression     Daily moisturizer to leg     Treat onychomycosis with systemic terbinafine  Monthly LFTs, standing order entered   This may be effective in resolving his tinea pedis as well     Topical nystatin for intertrigo     Call with concerns     RTC PRN         Guevara Rahman MD

## 2022-03-08 ENCOUNTER — TELEPHONE (OUTPATIENT)
Dept: INFECTIOUS DISEASES | Age: 67
End: 2022-03-08

## 2022-03-09 RX ORDER — TERBINAFINE HYDROCHLORIDE 250 MG/1
TABLET ORAL
Qty: 84 TABLET | Refills: 0 | OUTPATIENT
Start: 2022-03-09

## 2022-03-09 RX ORDER — NYSTATIN 100000 U/G
OINTMENT TOPICAL
Qty: 30 G | Refills: 1 | Status: SHIPPED | OUTPATIENT
Start: 2022-03-09

## 2022-03-09 NOTE — TELEPHONE ENCOUNTER
Patient called and asking if he could have refills for Nystatin ointment and terbinafine. He stated not having problems at this time however does not want it to return.     Thank you
Spoke with pt and he verbalized understanding and has been notified by his pharmacy the nystatin is ready for 
The terbinafine was to treat onychomycosis, not for long term or recurring use     The Nystatin was for fungal intertrigo, I will refill     Can you please let him know  Thanks
Yes

## 2023-04-01 NOTE — PROGRESS NOTES
Obstructive Sleep Apnea (CASTRO) Screening     Patient:  Artemio Castillo    YOB: 1955      Medical Record #:  [id number]                     Date:  2/20/2020     1. Are you a loud and/or regular snorer? [x]  Yes       [] No    2. Have you been observed to gasp or stop breathing during sleep? []  Yes       [x] No    3. Do you feel tired or groggy upon awakening or do you awaken with a headache?           []  Yes       [x] No    4. Are you often tired or fatigued during the wake time hours? []  Yes       [x] No    5. Do you fall asleep sitting, reading, watching TV or driving? []  Yes       [x] No    6. Do you often have problems with memory or concentration? []  Yes       [x] No    **If patient's score is ? 3 they are considered high risk for CASTRO. Notify the anesthesiologist of the high risk and document in focus note. Note:  If the patient's BMI is more than 35 kg m¯² , has neck circumference > 40 cm, and/or high blood pressure the risk is greater (© American [employer], 2006).
Pre and post operative expectations and routines explained to patient, verbalized understanding.
Virgia Ramirez    Age 60 y.o.    male    1955    [facility] [id number]    2/26/2020  Arrival Time_____________  OR Time____________[address]     Procedure(s):  PHACOEMULSIFICATION OF CATARACT LEFT EYE WITH INTRAOCULAR LENS IMPLANT                      Monitor Anesthesia Care    Surgeon(s):  Sylvester Cuellar, MD       Phone [phone] ([place]) [phone] (work)    [address] [place] [address]  Cell Work  _________________________________________________________________  _________________________________________________________________  _________________________________________________________________  _________________________________________________________________  _________________________________________________________________      PCP _____________________________ Phone_________________       H&P__________________Bringing    Chart            Epic  DOS     Called_______  EKG__________________Bringing    Chart            Epic  DOS     Called_______  LAB__________________ Bringing    Chart            Epic  DOS     Called_______  Cardiac Clearance_______Bringing    Chart            Epic      DOS       Called_______    Cardiologist________________________ Phone___________________________      ? Rastafari concerns / Waiver on Chart            PAT Communications_____________  ? Pre-op Instructions Given [place]          ______________________________  ? Directions to Surgery Center                          ______________________________  ? Transportation Home_______________      _______________________________  ?  Crutches/Walker__________________        _______________________________      ________Pre-op Orders   _______Transcribed    _______Wt.  ________Pharmacy          _______SCD  ______VTE     ______Beta Blocker  ________Consent             ________TED Pia Jacques
full weight-bearing

## (undated) DEVICE — AIRLIFE™ NASAL OXYGEN CANNULA CURVED, FLARED TIP, WITH 7 FEET (2.1 M) CRUSH RESISTANT TUBING, OVER-THE-EAR STYLE: Brand: AIRLIFE™

## (undated) DEVICE — SOLUTION IV 1000ML LAC RINGERS PH 6.5 INJ USP VIAFLX PLAS

## (undated) DEVICE — GLOVE ORANGE PI 8   MSG9080

## (undated) DEVICE — SOLUTION IRRIG 250ML STRL H2O PLAS POUR BTL USP

## (undated) DEVICE — 3M™ TEGADERM™ TRANSPARENT FILM DRESSING FRAME STYLE, 1624W, 2-3/8 IN X 2-3/4 IN (6 CM X 7 CM), 100/CT 4CT/CASE: Brand: 3M™ TEGADERM™

## (undated) DEVICE — SET GRAV VENT NVENT CK VLV 3 NDL FREE PRT 10 GTT

## (undated) DEVICE — NEEDLE FLTR 19GA L1.5IN WALL THK5UM BRN POLYPR HUB S STL

## (undated) DEVICE — SET ADMIN PRIMING 7ML L30IN 7.35LB 20 GTT 2ND RLER CLMP

## (undated) DEVICE — 3M(TM) TRANSPORE SURGICAL TAPE 1527-1: Brand: 3M™ TRANSPORE™

## (undated) DEVICE — Device

## (undated) DEVICE — NEEDLE HYPO 25GA L1.5IN BLU POLYPR HUB S STL REG BVL STR

## (undated) DEVICE — CATHETER IV 20GA L1.25IN PNK FEP SFTY STR HUB RADPQ DISP

## (undated) DEVICE — SILICONE I/A TIP STRAIGHT: Brand: ALCON

## (undated) DEVICE — GLOVE SURG SZ 65 L12IN FNGR THK94MIL STD WHT LTX FREE

## (undated) DEVICE — SURGICAL PROCEDURE PACK EYE ANDRSN

## (undated) DEVICE — GLOVE,SURG,SENSICARE,ALOE,LF,PF,7: Brand: MEDLINE

## (undated) DEVICE — ELECTRODE ECG MONITR FOAM TEAR DROP ADLT RED

## (undated) DEVICE — SYSTEM FLD MGMT DIA0.9MM 45DEG ULT BAL VISN ACT INTREPID

## (undated) DEVICE — TOWEL,OR,DSP,ST,BLUE,STD,4/PK,20PK/CS: Brand: MEDLINE

## (undated) DEVICE — AGENT VISCOELASTIC 0.6ML 23MG/ML 1% HYALURONATE SOD SYR

## (undated) DEVICE — MICROSURGICAL INSTRUMENT ANTERIOR CHAMBER CANNULA 30GA: Brand: ALCON